# Patient Record
Sex: MALE | Race: WHITE | NOT HISPANIC OR LATINO | Employment: OTHER | URBAN - NONMETROPOLITAN AREA
[De-identification: names, ages, dates, MRNs, and addresses within clinical notes are randomized per-mention and may not be internally consistent; named-entity substitution may affect disease eponyms.]

---

## 2017-01-02 DIAGNOSIS — R10.13 DYSPEPSIA: Primary | ICD-10-CM

## 2017-01-04 RX ORDER — OMEPRAZOLE 40 MG/1
CAPSULE, DELAYED RELEASE ORAL
Qty: 90 CAPSULE | Refills: 3 | Status: SHIPPED | OUTPATIENT
Start: 2017-01-04 | End: 2018-01-15

## 2017-01-25 ENCOUNTER — TRANSFERRED RECORDS (OUTPATIENT)
Dept: HEALTH INFORMATION MANAGEMENT | Facility: HOSPITAL | Age: 82
End: 2017-01-25

## 2017-03-03 DIAGNOSIS — I10 HTN (HYPERTENSION): ICD-10-CM

## 2017-03-03 DIAGNOSIS — R11.0 NAUSEA: ICD-10-CM

## 2017-03-06 RX ORDER — AMLODIPINE BESYLATE 5 MG/1
TABLET ORAL
Qty: 90 TABLET | Refills: 1 | Status: SHIPPED | OUTPATIENT
Start: 2017-03-06 | End: 2017-09-05

## 2017-03-06 RX ORDER — ONDANSETRON 4 MG/1
TABLET, FILM COATED ORAL
Qty: 20 TABLET | Refills: 1 | Status: SHIPPED | OUTPATIENT
Start: 2017-03-06 | End: 2017-08-18

## 2017-08-18 DIAGNOSIS — R11.0 NAUSEA: ICD-10-CM

## 2017-08-18 RX ORDER — ONDANSETRON 4 MG/1
4 TABLET, FILM COATED ORAL EVERY 12 HOURS PRN
Qty: 20 TABLET | Refills: 1 | Status: SHIPPED | OUTPATIENT
Start: 2017-08-18 | End: 2018-04-14

## 2017-08-18 NOTE — TELEPHONE ENCOUNTER
Ondansetron      Last Written Prescription Date: 3/6/17  Last Fill Quantity: 20,  # refills: 1   Last Office Visit with [facility], [facility] or [facility] prescribing provider: 12/20/16

## 2017-08-21 DIAGNOSIS — R60.0 PERIPHERAL EDEMA: ICD-10-CM

## 2017-08-21 NOTE — TELEPHONE ENCOUNTER
furosemide (LASIX) 20 MG tablet   Last Written Prescription Date: 10/12/16  Last Fill Quantity: 30, # refills: 10  Last Office Visit with G, P or [facility] prescribing provider: 12/20/16       Potassium   Date Value Ref Range Status   09/20/2016 4.2 3.4 - 5.3 mmol/L Final     Creatinine   Date Value Ref Range Status   09/20/2016 1.35 (H) 0.66 - 1.25 mg/dL Final     BP Readings from Last 3 Encounters:   12/20/16 114/60   09/20/16 132/62   08/15/16 136/58

## 2017-08-22 RX ORDER — FUROSEMIDE 20 MG
TABLET ORAL
Qty: 30 TABLET | Refills: 3 | Status: SHIPPED | OUTPATIENT
Start: 2017-08-22 | End: 2017-12-27

## 2017-08-28 DIAGNOSIS — E03.9 HYPOTHYROIDISM: ICD-10-CM

## 2017-08-28 RX ORDER — LEVOTHYROXINE SODIUM 75 UG/1
TABLET ORAL
Qty: 90 TABLET | Refills: 0 | Status: SHIPPED | OUTPATIENT
Start: 2017-08-28 | End: 2018-03-01

## 2017-08-28 NOTE — TELEPHONE ENCOUNTER
Levothyroxine     Last Written Prescription Date: 11/29/16  Last Quantity: 90, # refills: 2  Last Office Visit with G, P or [facility] prescribing provider: 12/20/16        TSH   Date Value Ref Range Status   09/20/2016 2.15 0.40 - 4.00 mU/L Final

## 2017-09-05 DIAGNOSIS — I10 HTN (HYPERTENSION): ICD-10-CM

## 2017-09-06 NOTE — TELEPHONE ENCOUNTER
Norvasc      Last Written Prescription Date: 3/6/17  Last Fill Quantity: 90, # refills: 1    Last Office Visit with G, P or [facility] prescribing provider:  12/20/16   Future Office Visit:        BP Readings from Last 3 Encounters:   12/20/16 114/60   09/20/16 132/62   08/15/16 136/58

## 2017-09-07 RX ORDER — AMLODIPINE BESYLATE 5 MG/1
TABLET ORAL
Qty: 90 TABLET | Refills: 1 | Status: SHIPPED | OUTPATIENT
Start: 2017-09-07 | End: 2018-03-01

## 2017-09-21 DIAGNOSIS — I87.2 VENOUS STASIS DERMATITIS OF BOTH LOWER EXTREMITIES: ICD-10-CM

## 2017-09-21 RX ORDER — TRIAMCINOLONE ACETONIDE 1 MG/G
OINTMENT TOPICAL
Qty: 80 G | Refills: 1 | Status: SHIPPED | OUTPATIENT
Start: 2017-09-21 | End: 2018-04-17

## 2017-09-21 NOTE — TELEPHONE ENCOUNTER
Triamcinolone       Last Written Prescription Date:6/20/16  Last Fill Quantity: 80g,  # refills: 1   Last Office Visit with [facility], [facility] or [facility] prescribing provider: 12/20/16    Medication d/c's 8/15/16    Clarita Smith LPN

## 2017-11-14 ENCOUNTER — OFFICE VISIT (OUTPATIENT)
Dept: FAMILY MEDICINE | Facility: OTHER | Age: 82
End: 2017-11-14
Attending: FAMILY MEDICINE
Payer: MEDICARE

## 2017-11-14 VITALS
HEART RATE: 67 BPM | DIASTOLIC BLOOD PRESSURE: 68 MMHG | OXYGEN SATURATION: 98 % | HEIGHT: 65 IN | SYSTOLIC BLOOD PRESSURE: 136 MMHG | RESPIRATION RATE: 16 BRPM | TEMPERATURE: 97 F | BODY MASS INDEX: 28.82 KG/M2 | WEIGHT: 173 LBS

## 2017-11-14 DIAGNOSIS — N18.30 CKD (CHRONIC KIDNEY DISEASE) STAGE 3, GFR 30-59 ML/MIN (H): ICD-10-CM

## 2017-11-14 DIAGNOSIS — E11.9 TYPE 2 DIABETES MELLITUS WITHOUT COMPLICATION, WITHOUT LONG-TERM CURRENT USE OF INSULIN (H): Primary | ICD-10-CM

## 2017-11-14 DIAGNOSIS — D63.1 ANEMIA IN STAGE 3 CHRONIC KIDNEY DISEASE (H): ICD-10-CM

## 2017-11-14 DIAGNOSIS — N18.30 ANEMIA IN STAGE 3 CHRONIC KIDNEY DISEASE (H): ICD-10-CM

## 2017-11-14 DIAGNOSIS — R41.3 MEMORY DIFFICULTY: ICD-10-CM

## 2017-11-14 DIAGNOSIS — I10 BENIGN ESSENTIAL HYPERTENSION: ICD-10-CM

## 2017-11-14 DIAGNOSIS — E03.4 HYPOTHYROIDISM DUE TO ACQUIRED ATROPHY OF THYROID: ICD-10-CM

## 2017-11-14 DIAGNOSIS — K13.0 ANGULAR CHEILITIS: ICD-10-CM

## 2017-11-14 DIAGNOSIS — Z23 NEED FOR VACCINATION WITH 13-POLYVALENT PNEUMOCOCCAL CONJUGATE VACCINE: ICD-10-CM

## 2017-11-14 LAB
ALBUMIN UR-MCNC: NEGATIVE MG/DL
APPEARANCE UR: CLEAR
BILIRUB UR QL STRIP: NEGATIVE
COLOR UR AUTO: YELLOW
ERYTHROCYTE [DISTWIDTH] IN BLOOD BY AUTOMATED COUNT: 13 % (ref 10–15)
GLUCOSE UR STRIP-MCNC: NEGATIVE MG/DL
HCT VFR BLD AUTO: 34 % (ref 40–53)
HGB BLD-MCNC: 11.4 G/DL (ref 13.3–17.7)
HGB UR QL STRIP: NEGATIVE
KETONES UR STRIP-MCNC: NEGATIVE MG/DL
LEUKOCYTE ESTERASE UR QL STRIP: NEGATIVE
MCH RBC QN AUTO: 31.8 PG (ref 26.5–33)
MCHC RBC AUTO-ENTMCNC: 33.5 G/DL (ref 31.5–36.5)
MCV RBC AUTO: 95 FL (ref 78–100)
NITRATE UR QL: NEGATIVE
PH UR STRIP: 6 PH (ref 5–7)
PLATELET # BLD AUTO: 154 10E9/L (ref 150–450)
RBC # BLD AUTO: 3.58 10E12/L (ref 4.4–5.9)
SOURCE: NORMAL
SP GR UR STRIP: 1.01 (ref 1–1.03)
UROBILINOGEN UR STRIP-ACNC: 0.2 EU/DL (ref 0.2–1)
WBC # BLD AUTO: 6.6 10E9/L (ref 4–11)

## 2017-11-14 PROCEDURE — 84443 ASSAY THYROID STIM HORMONE: CPT | Mod: ZL | Performed by: FAMILY MEDICINE

## 2017-11-14 PROCEDURE — 85027 COMPLETE CBC AUTOMATED: CPT | Mod: ZL | Performed by: FAMILY MEDICINE

## 2017-11-14 PROCEDURE — 36415 COLL VENOUS BLD VENIPUNCTURE: CPT | Mod: ZL | Performed by: FAMILY MEDICINE

## 2017-11-14 PROCEDURE — 80061 LIPID PANEL: CPT | Mod: ZL | Performed by: FAMILY MEDICINE

## 2017-11-14 PROCEDURE — G0009 ADMIN PNEUMOCOCCAL VACCINE: HCPCS | Performed by: FAMILY MEDICINE

## 2017-11-14 PROCEDURE — 81003 URINALYSIS AUTO W/O SCOPE: CPT | Mod: ZL | Performed by: FAMILY MEDICINE

## 2017-11-14 PROCEDURE — 83036 HEMOGLOBIN GLYCOSYLATED A1C: CPT | Mod: ZL | Performed by: FAMILY MEDICINE

## 2017-11-14 PROCEDURE — 99212 OFFICE O/P EST SF 10 MIN: CPT | Mod: 25

## 2017-11-14 PROCEDURE — 90670 PCV13 VACCINE IM: CPT | Performed by: FAMILY MEDICINE

## 2017-11-14 PROCEDURE — 90471 IMMUNIZATION ADMIN: CPT | Performed by: FAMILY MEDICINE

## 2017-11-14 PROCEDURE — 99214 OFFICE O/P EST MOD 30 MIN: CPT | Performed by: FAMILY MEDICINE

## 2017-11-14 PROCEDURE — 80048 BASIC METABOLIC PNL TOTAL CA: CPT | Mod: ZL | Performed by: FAMILY MEDICINE

## 2017-11-14 PROCEDURE — 40000788 ZZHCL STATISTIC ESTIMATED AVERAGE GLUCOSE: Mod: ZL | Performed by: FAMILY MEDICINE

## 2017-11-14 RX ORDER — NYSTATIN AND TRIAMCINOLONE ACETONIDE 100000; 1 [USP'U]/G; MG/G
CREAM TOPICAL 2 TIMES DAILY PRN
Qty: 30 G | Refills: 1 | Status: SHIPPED | OUTPATIENT
Start: 2017-11-14 | End: 2022-08-12

## 2017-11-14 ASSESSMENT — ANXIETY QUESTIONNAIRES

## 2017-11-14 ASSESSMENT — PATIENT HEALTH QUESTIONNAIRE - PHQ9
5. POOR APPETITE OR OVEREATING: NOT AT ALL
SUM OF ALL RESPONSES TO PHQ QUESTIONS 1-9: 0

## 2017-11-14 ASSESSMENT — PAIN SCALES - GENERAL: PAINLEVEL: NO PAIN (0)

## 2017-11-14 NOTE — MR AVS SNAPSHOT
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      Who to contact     If you have questions or need follow up information about today's clinic visit or your schedule please contact [facility] directly at [phone].  Normal or non-critical lab and imaging results will be communicated to you by MyChart, letter or phone within 4 business days after the clinic has received the results. If you do not hear from us within 7 days, please contact the clinic through e[employer] or phone. If you have a critical or abnormal lab result, we will notify you by phone as soon as possible.  Submit refill requests through [employer] or call your pharmacy and they will forward the refill request to us. Please allow 3 business days for your refill to be completed.          Additional Information About Your Visit        e[employer]e [employer] Information     Cur[employer]ulet lets you send messages to your doctor, view your test results, renew your prescriptions, schedule appointments and more. To sign up, go to [email or web address] . Click on \"Log in\" on the left side of the screen, which will take you to the Welcome page. Then click on \"Sign up " "Now\" on the right side of the page.     You will be asked to enter the access code listed below, as well as some personal information. Please follow the directions to create your username and password.     Your access code is: [email or web address]  Expires: 2018  4:06 PM     Your access code will  in 90 days. If you need help or a new code, please call your [facility] clinic or [phone].        Care EveryWhere ID     This is your Care EveryWhere ID. This could be used by other organizations to access your [facility] medical records  [id number]        Your Vitals Were     Pulse Temperature Respirations Height Pulse Oximetry BMI (Body Mass Index)    67 97  F (36.1  C) (Tympanic) 16 5' 4.75\" (1.645 m) 98% 29.01 kg/m2       Blood Pressure from Last 3 Encounters:   17 136/68   16 114/60   16 132/62    Weight from Last 3 Encounters:   17 173 lb (78.5 kg)   16 178 lb (80.7 kg)   16 175 lb (79.4 kg)              We Performed the Following     *UA reflex to Microscopic and Culture - [facility]     ADMIN 1st VACCINE     Basic metabolic panel     CBC with platelets     Hemoglobin A1c     Lipid Profile     PNEUMOCOCCAL CONJ VACCINE 13 VALENT IM     TSH          Today's Medication Changes          These changes are accurate as of: 17  4:06 PM.  If you have any questions, ask your nurse or doctor.               Start taking these medicines.        Dose/Directions    nystatin-triamcinolone cream   Commonly known as:  MYCOLOG II   Used for:  Angular cheilitis   Started by:  Kiersten Mueller MD        Apply topically 2 times daily as needed (rash)   Quantity:  30 g   Refills:  1         Stop taking these medicines if you haven't already. Please contact your care team if you have questions.     ibuprofen 800 MG tablet   Commonly known as:  ADVIL/MOTRIN   Stopped by:  Kiersten Mueller MD                Where to get your medicines      These medications were sent to [facility] - [place] - [address]  [address] [place] [address]     Phone:  [phone]     nystatin-triamcinolone cream                Primary Care Provider Office Phone # Fax #    Kiersten Mueller [phone] [address] [facility] [address]        Equal Access to Services     [facility]: Hadii aad ku hadasho Soomaali, waaxda luqadaha, qaybta kaalmada adeegyada, waxay idiin hayaan adeeg kharajessica laoli . So [facility] [phone].    ATENCIÓN: Si habla español, tiene a garcia disposición servicios gratuitos de asistencia lingüística. Yuan al [phone].    We comply with applicable federal civil rights laws and [place] laws. We do not discriminate on the basis of race, color, national origin, age, disability, sex, sexual orientation, or gender identity.            Thank you!     Thank you for choosing [facility]  for your care. Our goal is always to provide you with excellent care. Hearing back from our patients is one way we can continue to improve our services. Please take a few minutes to complete the written survey that you may receive in the mail after your visit with us. Thank you!             Your Updated Medication List - Protect others around you: Learn how to safely use, store and throw away your medicines at www.disposemymeds.org.          This list is accurate as of: 11/14/17  4:06 PM.  Always use your most recent med list.                   Brand Name Dispense Instructions for use Diagnosis    amLODIPine 5 MG tablet    NORVASC    90 tablet    TAKE 1 TABLET DAILY    HTN (hypertension)       cholecalciferol 1000 UNITS capsule    vitamin  -D     1 capsule daily Take 1 by oral route every day.        COLACE PO      Take 100 mg by mouth 4 tabs daily        ferrous sulfate 325 (65 FE) MG tablet    IRON    30 tablet    Take 1 tablet (325 mg) by mouth daily (with breakfast)    Anemia       fluticasone 50 MCG/ACT spray    FLONASE    16 g    SPRAY 1-2 SPRAYS  INTO BOTH NOSTRILS DAILY    Chronic rhinitis       furosemide 20 MG tablet    LASIX    30 tablet    TAKE 1 TABLET (20 MG) BY MOUTH DAILY AS NEEDED    Peripheral edema       hydrocortisone valerate 0.2 % ointment    WEST-ROCKY    60 g    Apply sparingly to affected area every other day    Venous stasis dermatitis of both lower extremities       levothyroxine 75 MCG tablet    SYNTHROID/LEVOTHROID    90 tablet    TAKE 1 TABLET DAILY FOR THYROID    Hypothyroidism       METHADONE HCL PO      Take 5 mg by mouth Takes 1 tab every AM and 1.5 tabs every evening        NEURONTIN PO      Take 300 mg by mouth Takes 2cabs every AM, 2 cap afternoon and 3 caps every evening        nystatin-triamcinolone cream    MYCOLOG II    30 g    Apply topically 2 times daily as needed (rash)    Angular cheilitis       omeprazole 40 MG capsule    priLOSEC    90 capsule    TAKE 1 CAPSULE (40 MG) BY MOUTH DAILY. BEST 30 MIN BEFORE A MEAL    Dyspepsia       ondansetron 4 MG tablet    ZOFRAN    20 tablet    Take 1 tablet (4 mg) by mouth every 12 hours as needed for nausea    Nausea       triamcinolone 0.1 % ointment    KENALOG    80 g    APPLY TOPICALLY 2 TIMES DAILY    Venous stasis dermatitis of both lower extremities

## 2017-11-14 NOTE — LETTER
November 16, 2017      Graham Mills  [address] [place] [address]        Dear Graham,       Results for orders placed or performed in visit on 11/14/17   *UA reflex to Microscopic and Culture - [facility]   Result Value Ref Range    Color Urine Yellow     Appearance Urine Clear     Glucose Urine Negative NEG^Negative mg/dL    Bilirubin Urine Negative NEG^Negative    Ketones Urine Negative NEG^Negative mg/dL    Specific Gravity Urine 1.015 1.003 - 1.035    Blood Urine Negative NEG^Negative    pH Urine 6.0 5.0 - 7.0 pH    Protein Albumin Urine Negative NEG^Negative mg/dL    Urobilinogen Urine 0.2 0.2 - 1.0 EU/dL    Nitrite Urine Negative NEG^Negative    Leukocyte Esterase Urine Negative NEG^Negative    Source Midstream Urine    Basic metabolic panel   Result Value Ref Range    Sodium 137 133 - 144 mmol/L    Potassium 3.8 3.4 - 5.3 mmol/L    Chloride 102 94 - 109 mmol/L    Carbon Dioxide 28 20 - 32 mmol/L    Anion Gap 7 3 - 14 mmol/L    Glucose 90 70 - 99 mg/dL    Urea Nitrogen 19 7 - 30 mg/dL    Creatinine 1.30 (H) 0.66 - 1.25 mg/dL    GFR Estimate 53 (L) >60 mL/min/1.7m2    GFR Estimate If Black 64 >60 mL/min/1.7m2    Calcium 9.0 8.5 - 10.1 mg/dL   Hemoglobin A1c   Result Value Ref Range    Hemoglobin A1C 6.0 4.3 - 6.0 %   Lipid Profile   Result Value Ref Range    Cholesterol 182 <200 mg/dL    Triglycerides 127 <150 mg/dL    HDL Cholesterol 50 >39 mg/dL    LDL Cholesterol Calculated 107 (H) <100 mg/dL    Non HDL Cholesterol 132 (H) <130 mg/dL   TSH   Result Value Ref Range    TSH 2.03 0.40 - 4.00 mU/L   CBC with platelets   Result Value Ref Range    WBC 6.6 4.0 - 11.0 10e9/L    RBC Count 3.58 (L) 4.4 - 5.9 10e12/L    Hemoglobin 11.4 (L) 13.3 - 17.7 g/dL    Hematocrit 34.0 (L) 40.0 - 53.0 %    MCV 95 78 - 100 fl    MCH 31.8 26.5 - 33.0 pg    MCHC 33.5 31.5 - 36.5 g/dL    RDW 13.0 10.0 - 15.0 %    Platelet Count 154 150 - 450 10e9/L   Estimated Average Glucose   Result Value Ref Range    Estimated Average  Glucose 126 mg/dL             Sincerely,        Kiersten Mueller MD

## 2017-11-14 NOTE — NURSING NOTE
"Chief Complaint   Patient presents with     UTI     Recheck Medication       Initial /68 (BP Location: Left arm, Patient Position: Sitting, Cuff Size: Adult Regular)  Pulse 67  Temp 97  F (36.1  C) (Tympanic)  Resp 16  Ht 5' 4.75\" (1.645 m)  Wt 173 lb (78.5 kg)  SpO2 98%  BMI 29.01 kg/m2 Estimated body mass index is 29.01 kg/(m^2) as calculated from the following:    Height as of this encounter: 5' 4.75\" (1.645 m).    Weight as of this encounter: 173 lb (78.5 kg).  Medication Reconciliation: complete   Sandy Miller MA  "

## 2017-11-14 NOTE — PROGRESS NOTES
SUBJECTIVE:                                                    Graham Mills is a 84 year old male who presents to clinic today for the following health issues:      Diabetes Follow-up      Patient is checking blood sugars: not at all    Diabetic concerns: None     Symptoms of hypoglycemia (low blood sugar): none     Paresthesias (numbness or burning in feet) or sores: No     Date of last diabetic eye exam: 10/24/17      Hypertension Follow-up      Outpatient blood pressures are not being checked.    Low Salt Diet: not monitoring salt        Amount of exercise or physical activity: 6-7 days/week for an average of 45-60 minutes    Problems taking medications regularly: No    Medication side effects: none    Diet: regular (no restrictions)    Hypothyroidism Follow-up      Since last visit, patient describes the following symptoms: Weight stable, no hair loss, no skin changes, no constipation, no loose stools      Amount of exercise or physical activity: 6-7 days/week for an average of 45-60 minutes    Problems taking medications regularly: No    Medication side effects: none    Diet: regular (no restrictions)    Possible UTI      Duration: Couple of months    Description (location/character/radiation): forgetfullness    Intensity:  low    Accompanying signs and symptoms: N/A    History (similar episodes/previous evaluation): None    Precipitating or alleviating factors: None    Therapies tried and outcome: None    Daughters have noted mild memory changes.  They know that UTIs can sometimes cause issues, and they would like a UA checked just to be sure.        Sore on corner of mouth      Duration: couple of months    Description (location/character/radiation): Red irritation near corner of mouth    Intensity:  moderate    Accompanying signs and symptoms: none    History (similar episodes/previous evaluation): None    Precipitating or alleviating factors: None    Therapies tried and outcome: None          Problem  list and histories reviewed & adjusted, as indicated.  Additional history: as documented    Patient Active Problem List   Diagnosis     Benign essential hypertension     Other extrapyramidal diseases and abnormal movemen     Transient cerebral ischemia     Osteoarthrosis involving lower leg     Obsessive-compulsive disorder     Restless Legs Syndrome     Lumbago     Spinal stenosis     Advanced care planning/counseling discussion     Venous (peripheral) insufficiency     GERD (gastroesophageal reflux disease)     Hypothyroidism     Anemia in chronic kidney disease (CKD)     CKD (chronic kidney disease) stage 3, GFR 30-59 ml/min     Type 2 diabetes mellitus without complication (H)     Past Surgical History:   Procedure Laterality Date     BACK SURGERY  2003     CHOLECYSTECTOMY  1970     COLONOSCOPY  2000     hemicolectomy      Polyps, colon     hiatal hernia  1970     TONSILLECTOMY         Social History   Substance Use Topics     Smoking status: Former Smoker     Packs/day: 1.00     Years: 10.00     Types: Cigarettes     Smokeless tobacco: Never Used      Comment: tried to quit-yes, year quit-1975, no passive exposure     Alcohol use No     Family History   Problem Relation Age of Onset     Other - See Comments Mother      Cataracts         Current Outpatient Prescriptions   Medication Sig Dispense Refill     nystatin-triamcinolone (MYCOLOG II) cream Apply topically 2 times daily as needed (rash) 30 g 1     triamcinolone (KENALOG) 0.1 % ointment APPLY TOPICALLY 2 TIMES DAILY 80 g 1     amLODIPine (NORVASC) 5 MG tablet TAKE 1 TABLET DAILY 90 tablet 1     levothyroxine (SYNTHROID/LEVOTHROID) 75 MCG tablet TAKE 1 TABLET DAILY FOR THYROID 90 tablet 0     ondansetron (ZOFRAN) 4 MG tablet Take 1 tablet (4 mg) by mouth every 12 hours as needed for nausea 20 tablet 1     omeprazole (PRILOSEC) 40 MG capsule TAKE 1 CAPSULE (40 MG) BY MOUTH DAILY. BEST 30 MIN BEFORE A MEAL 90 capsule 3     hydrocortisone valerate (WEST-ROCKY)  "0.2 % ointment Apply sparingly to affected area every other day 60 g 1     fluticasone (FLONASE) 50 MCG/ACT nasal spray SPRAY 1-2 SPRAYS INTO BOTH NOSTRILS DAILY 16 g 3     ferrous sulfate (IRON) 325 (65 FE) MG tablet Take 1 tablet (325 mg) by mouth daily (with breakfast) 30 tablet 2     Docusate Sodium (COLACE PO) Take 100 mg by mouth 4 tabs daily       Gabapentin (NEURONTIN PO) Take 300 mg by mouth Takes 2cabs every AM, 2 cap afternoon and 3 caps every evening       METHADONE HCL PO Take 5 mg by mouth Takes 1 tab every AM and 1.5 tabs every evening       cholecalciferol (VITAMIN D3) 1000 UNITS capsule 1 capsule daily Take 1 by oral route every day.       furosemide (LASIX) 20 MG tablet TAKE 1 TABLET (20 MG) BY MOUTH DAILY AS NEEDED (Patient not taking: Reported on 11/14/2017) 30 tablet 3     Allergies   Allergen Reactions     Augmentin Nausea     Flu Virus Vaccine      Levofloxacin      Levaquin           ROS:  Constitutional, HEENT, cardiovascular, pulmonary, gi and gu systems are negative, except as otherwise noted.      OBJECTIVE:   /68 (BP Location: Left arm, Patient Position: Sitting, Cuff Size: Adult Regular)  Pulse 67  Temp 97  F (36.1  C) (Tympanic)  Resp 16  Ht 5' 4.75\" (1.645 m)  Wt 173 lb (78.5 kg)  SpO2 98%  BMI 29.01 kg/m2  Body mass index is 29.01 kg/(m^2).  GENERAL: healthy, alert and no distress  HENT: angular chelitis noted  PSYCH: mentation appears normal, affect normal/bright    Diagnostic Test Results:  none     ASSESSMENT/PLAN:     Diabetes Type II, A1c Controlled, non-insulin dependent   Associated with the following complications    Renal Complications:  CKD    Ophthalmologic Complications: None    Neurologic Complications: None    Peripheral Vascular Complications:  None    Other: None   Plan:  Labs:  See orders    Hypertension; controlled   Associated with the following complications:    CKD and Diabetes   Plan:  Labs:   See orders    Chronic Kidney Disease Stage 3 , stable "     Associated with the following complications: Anemia     Plan:  Labs See orders        Hypothyroidism; controlled/euthyroid   Plan:  Labs:  See orders          ICD-10-CM    1. Type 2 diabetes mellitus without complication, without long-term current use of insulin (H) E11.9 Hemoglobin A1c     Lipid Profile   2. Benign essential hypertension I10 Basic metabolic panel   3. Hypothyroidism due to acquired atrophy of thyroid E03.4 TSH   4. CKD (chronic kidney disease) stage 3, GFR 30-59 ml/min N18.3 Basic metabolic panel   5. Anemia in stage 3 chronic kidney disease N18.3 CBC with platelets    D63.1    6. Memory difficulty R41.3 *UA reflex to Microscopic and Culture - [facility]     CANCELED: UA reflex to Microscopic   7. Angular cheilitis K13.0 nystatin-triamcinolone (MYCOLOG II) cream   8. Need for vaccination with 13-polyvalent pneumococcal conjugate vaccine Z23 PNEUMOCOCCAL CONJ VACCINE 13 VALENT IM     ADMIN 1st VACCINE       FUTURE APPOINTMENTS:       - Follow-up visit in 6 months, sooner as needed.      Kiersten Mueller MD  [facility]

## 2017-11-15 LAB
ANION GAP SERPL CALCULATED.3IONS-SCNC: 7 MMOL/L (ref 3–14)
BUN SERPL-MCNC: 19 MG/DL (ref 7–30)
CALCIUM SERPL-MCNC: 9 MG/DL (ref 8.5–10.1)
CHLORIDE SERPL-SCNC: 102 MMOL/L (ref 94–109)
CHOLEST SERPL-MCNC: 182 MG/DL
CO2 SERPL-SCNC: 28 MMOL/L (ref 20–32)
CREAT SERPL-MCNC: 1.3 MG/DL (ref 0.66–1.25)
EST. AVERAGE GLUCOSE BLD GHB EST-MCNC: 126 MG/DL
GFR SERPL CREATININE-BSD FRML MDRD: 53 ML/MIN/1.7M2
GLUCOSE SERPL-MCNC: 90 MG/DL (ref 70–99)
HBA1C MFR BLD: 6 % (ref 4.3–6)
HDLC SERPL-MCNC: 50 MG/DL
LDLC SERPL CALC-MCNC: 107 MG/DL
NONHDLC SERPL-MCNC: 132 MG/DL
POTASSIUM SERPL-SCNC: 3.8 MMOL/L (ref 3.4–5.3)
SODIUM SERPL-SCNC: 137 MMOL/L (ref 133–144)
TRIGL SERPL-MCNC: 127 MG/DL
TSH SERPL DL<=0.005 MIU/L-ACNC: 2.03 MU/L (ref 0.4–4)

## 2017-11-15 ASSESSMENT — ANXIETY QUESTIONNAIRES: GAD7 TOTAL SCORE: 0

## 2017-12-27 DIAGNOSIS — R60.0 PERIPHERAL EDEMA: ICD-10-CM

## 2017-12-27 NOTE — TELEPHONE ENCOUNTER
Furosemide 20mg      Last Written Prescription Date: 12/27/17  Last Fill Quantity: 30,  # refills: 3   Last Office Visit with G, [facility] or [facility] prescribing provider: 11/14/17

## 2017-12-29 RX ORDER — FUROSEMIDE 20 MG
TABLET ORAL
Qty: 30 TABLET | Refills: 5 | Status: SHIPPED | OUTPATIENT
Start: 2017-12-29 | End: 2018-06-18

## 2018-01-15 DIAGNOSIS — R10.13 DYSPEPSIA: ICD-10-CM

## 2018-01-16 RX ORDER — OMEPRAZOLE 40 MG/1
CAPSULE, DELAYED RELEASE ORAL
Qty: 90 CAPSULE | Refills: 2 | Status: SHIPPED | OUTPATIENT
Start: 2018-01-16 | End: 2018-08-11

## 2018-03-01 DIAGNOSIS — E03.9 HYPOTHYROIDISM: ICD-10-CM

## 2018-03-01 DIAGNOSIS — I10 HTN (HYPERTENSION): ICD-10-CM

## 2018-03-01 RX ORDER — AMLODIPINE BESYLATE 5 MG/1
TABLET ORAL
Qty: 90 TABLET | Refills: 0 | Status: SHIPPED | OUTPATIENT
Start: 2018-03-01 | End: 2018-09-04

## 2018-03-01 RX ORDER — LEVOTHYROXINE SODIUM 75 UG/1
TABLET ORAL
Qty: 90 TABLET | Refills: 0 | Status: SHIPPED | OUTPATIENT
Start: 2018-03-01 | End: 2018-06-05

## 2018-06-05 DIAGNOSIS — E03.9 HYPOTHYROIDISM: ICD-10-CM

## 2018-06-05 NOTE — TELEPHONE ENCOUNTER
LEVOTHYROXINE 75 MCG TABLET 75 TAB    Last Written Prescription Date:  3/1/18  Last Fill Quantity: 90,   # refills: 0  Last Office Visit: 11/14/17  Future Office visit:

## 2018-06-07 DIAGNOSIS — E03.9 HYPOTHYROIDISM: ICD-10-CM

## 2018-06-07 RX ORDER — LEVOTHYROXINE SODIUM 75 UG/1
TABLET ORAL
Qty: 90 TABLET | Refills: 0 | Status: SHIPPED | OUTPATIENT
Start: 2018-06-07 | End: 2018-09-04

## 2018-06-08 RX ORDER — LEVOTHYROXINE SODIUM 75 UG/1
TABLET ORAL
Qty: 90 TABLET | Refills: 0 | OUTPATIENT
Start: 2018-06-08

## 2018-06-08 NOTE — TELEPHONE ENCOUNTER
Levothyroxine  Last Written Prescription Date: 6/7/18  Last Fill Quantity: 90 # of Refills: 0  Last Office Visit: 11/14/17

## 2018-06-18 ENCOUNTER — TRANSFERRED RECORDS (OUTPATIENT)
Dept: HEALTH INFORMATION MANAGEMENT | Facility: CLINIC | Age: 83
End: 2018-06-18

## 2018-06-18 DIAGNOSIS — R60.0 PERIPHERAL EDEMA: ICD-10-CM

## 2018-06-18 RX ORDER — FUROSEMIDE 20 MG
TABLET ORAL
Qty: 30 TABLET | Refills: 3 | Status: SHIPPED | OUTPATIENT
Start: 2018-06-18 | End: 2018-10-19

## 2018-07-06 LAB — EJECTION FRACTION: =>55 %

## 2018-08-09 DIAGNOSIS — R11.0 NAUSEA: ICD-10-CM

## 2018-08-09 NOTE — TELEPHONE ENCOUNTER
zofran      Last Written Prescription Date:  4/16/18  Last Fill Quantity: 20,   # refills: 1  Last Office Visit: 11/14/17  Future Office visit: none

## 2018-08-10 RX ORDER — ONDANSETRON 4 MG/1
TABLET, FILM COATED ORAL
Qty: 20 TABLET | Refills: 1 | Status: SHIPPED | OUTPATIENT
Start: 2018-08-10 | End: 2018-11-23

## 2018-09-04 DIAGNOSIS — I10 HTN (HYPERTENSION): ICD-10-CM

## 2018-09-04 NOTE — TELEPHONE ENCOUNTER
Amlodipine   Last Written Prescription Date: 3/1/18  Last Fill Quantity: 90 # of Refills: 0  Last Office Visit: 11/14/17

## 2018-09-06 RX ORDER — AMLODIPINE BESYLATE 5 MG/1
TABLET ORAL
Qty: 90 TABLET | Refills: 0 | Status: SHIPPED | OUTPATIENT
Start: 2018-09-06 | End: 2018-12-07

## 2018-10-19 DIAGNOSIS — R60.0 PERIPHERAL EDEMA: ICD-10-CM

## 2018-10-19 RX ORDER — FUROSEMIDE 20 MG
TABLET ORAL
Qty: 30 TABLET | Refills: 3 | Status: SHIPPED | OUTPATIENT
Start: 2018-10-19 | End: 2021-06-23

## 2018-11-16 ENCOUNTER — TRANSFERRED RECORDS (OUTPATIENT)
Dept: HEALTH INFORMATION MANAGEMENT | Facility: CLINIC | Age: 83
End: 2018-11-16

## 2018-11-23 DIAGNOSIS — R11.0 NAUSEA: ICD-10-CM

## 2018-11-23 RX ORDER — ONDANSETRON 4 MG/1
TABLET, FILM COATED ORAL
Qty: 20 TABLET | Refills: 0 | Status: SHIPPED | OUTPATIENT
Start: 2018-11-23 | End: 2019-08-07

## 2018-12-05 ENCOUNTER — TRANSFERRED RECORDS (OUTPATIENT)
Dept: HEALTH INFORMATION MANAGEMENT | Facility: CLINIC | Age: 83
End: 2018-12-05

## 2018-12-07 DIAGNOSIS — I10 HTN (HYPERTENSION): ICD-10-CM

## 2018-12-07 DIAGNOSIS — E03.9 HYPOTHYROIDISM, UNSPECIFIED TYPE: Primary | ICD-10-CM

## 2018-12-07 DIAGNOSIS — I10 BENIGN ESSENTIAL HYPERTENSION: Primary | ICD-10-CM

## 2018-12-07 DIAGNOSIS — E03.9 HYPOTHYROIDISM: ICD-10-CM

## 2018-12-10 RX ORDER — LEVOTHYROXINE SODIUM 75 UG/1
75 TABLET ORAL DAILY
Qty: 90 TABLET | Refills: 1 | Status: SHIPPED | OUTPATIENT
Start: 2018-12-10 | End: 2019-06-06

## 2018-12-10 RX ORDER — AMLODIPINE BESYLATE 5 MG/1
5 TABLET ORAL DAILY
Qty: 90 TABLET | Refills: 0 | Status: SHIPPED | OUTPATIENT
Start: 2018-12-10 | End: 2021-06-23

## 2018-12-10 NOTE — TELEPHONE ENCOUNTER
Levothyroxine      Last Written Prescription Date:  9/4/18  Last Fill Quantity: 90,   # refills: 1  Last Office Visit: 11/14/18\

## 2018-12-10 NOTE — TELEPHONE ENCOUNTER
Protocol failed due to    Recent (12 mo) or future (30 days) visit within the authorizing provider's specialty    Normal serum creatinine on file in past 12 months     Please advise. Thank you!

## 2018-12-10 NOTE — TELEPHONE ENCOUNTER
Amlodipine      Last Written Prescription Date:  9/6/18  Last Fill Quantity: 90,   # refills: 0  Last Office Visit: 11/14/17

## 2018-12-10 NOTE — TELEPHONE ENCOUNTER
Protocol failed due to    Recent (12 mo) or future (30 days) visit within the authorizing provider's specialty    Normal TSH on file in past 12 months     Last office visit 11/14/17    Please advise. Thank you!

## 2019-01-03 ENCOUNTER — TRANSFERRED RECORDS (OUTPATIENT)
Dept: HEALTH INFORMATION MANAGEMENT | Facility: CLINIC | Age: 84
End: 2019-01-03

## 2019-02-18 DIAGNOSIS — R10.13 DYSPEPSIA: ICD-10-CM

## 2019-02-18 RX ORDER — OMEPRAZOLE 40 MG/1
CAPSULE, DELAYED RELEASE ORAL
Qty: 90 CAPSULE | Refills: 0 | OUTPATIENT
Start: 2019-02-18

## 2019-02-18 NOTE — TELEPHONE ENCOUNTER
Prilosec  Last Written Prescription Date:  8/14/18  Last Fill Qty:  90, # Refills:  0  Last Office Visit:  11/14/17    Patient has not been seen since 11/14/17.  No appointments scheduled.  Please advise.  Thank you.

## 2019-04-04 NOTE — TELEPHONE ENCOUNTER
- continue allopurinol   furosemide (LASIX) 20 MG tablet      Last Written Prescription Date:  6/18/18  Last Fill Quantity: 30,   # refills: 3  Last Office Visit: 11/14/17  Future Office visit:       Routing refill request to provider for review/approval because:   Normal serum creatinine on file in past 12 months           Recent Labs   Lab Test  11/14/17   1551   CR  1.30*        Please advise. Thanks!

## 2019-05-30 ENCOUNTER — TRANSFERRED RECORDS (OUTPATIENT)
Dept: HEALTH INFORMATION MANAGEMENT | Facility: CLINIC | Age: 84
End: 2019-05-30

## 2019-08-07 DIAGNOSIS — R11.0 NAUSEA: ICD-10-CM

## 2019-08-07 RX ORDER — ONDANSETRON 4 MG/1
TABLET, FILM COATED ORAL
Qty: 20 TABLET | Refills: 0 | Status: SHIPPED | OUTPATIENT
Start: 2019-08-07 | End: 2020-10-27

## 2019-12-04 DIAGNOSIS — E03.9 HYPOTHYROIDISM, UNSPECIFIED TYPE: ICD-10-CM

## 2019-12-04 LAB
ALT SERPL-CCNC: 9 U/L (ref 18–65)
CHOLESTEROL (EXTERNAL): 181 MG/DL
CREATININE (EXTERNAL): 1.36 MG/DL (ref 0.8–1.5)
GFR ESTIMATED (EXTERNAL): 50 ML/MIN/1.73M2
GLUCOSE (EXTERNAL): 101 MG/DL (ref 60–99)
HBA1C MFR BLD: 6.3 %
HDLC SERPL-MCNC: 41 MG/DL
LDL CHOLESTEROL CALCULATED (EXTERNAL): 105 MG/DL
POTASSIUM (EXTERNAL): 3.7 MEQ/L (ref 3.5–5.1)
TRIGLYCERIDES (EXTERNAL): 177 MG/DL
TSH SERPL-ACNC: 0.87 MIU/ML (ref 0.35–4.8)

## 2019-12-09 NOTE — TELEPHONE ENCOUNTER
Looks like patient might be receiving care at [facility].  Please confirm this, then I will decline refill.

## 2019-12-09 NOTE — TELEPHONE ENCOUNTER
levothyroxine (SYNTHROID/LEVOTHROID) 75 MCG tablet      Last Written Prescription Date:  6/7/19  Last Fill Quantity: 90,   # refills: 1  Last Office Visit: 11/14/17  Future Office visit:       Routing refill request to provider for review/approval because:  Due for office visit and labs. Please advise. Thank you!    TSH   Date Value Ref Range Status   11/14/2017 2.03 0.40 - 4.00 mU/L Final

## 2019-12-31 RX ORDER — LEVOTHYROXINE SODIUM 75 UG/1
TABLET ORAL
Qty: 90 TABLET | Refills: 1 | OUTPATIENT
Start: 2019-12-31

## 2020-01-10 ENCOUNTER — TRANSFERRED RECORDS (OUTPATIENT)
Dept: HEALTH INFORMATION MANAGEMENT | Facility: CLINIC | Age: 85
End: 2020-01-10

## 2020-01-10 LAB — EJECTION FRACTION: >=55 %

## 2020-02-03 ENCOUNTER — TRANSFERRED RECORDS (OUTPATIENT)
Dept: HEALTH INFORMATION MANAGEMENT | Facility: CLINIC | Age: 85
End: 2020-02-03

## 2020-07-06 ENCOUNTER — TELEPHONE (OUTPATIENT)
Dept: FAMILY MEDICINE | Facility: OTHER | Age: 85
End: 2020-07-06

## 2020-07-06 NOTE — TELEPHONE ENCOUNTER
Doctor returning call, states she just faxed over some notes. She is the pain specialist for the patient and she is closing her clinic. She states he needs a PCP that can and will cover the pain medications etc, states the current PCP does not seem to want to deal with pain medications etc.. She is requesting that Nadir Bonds take over pain control for this patient as she has worked with Nadir Bonds on other occassions with other patients and things seemed to work well. She states if there are any questions she can be reached at [phone]. She states she will continue medication fills until he establishes with Nadir Bonds. He is stable and there are no concerns or issues with pain medication abuse. Ashley LeChevalier LPN

## 2020-07-06 NOTE — TELEPHONE ENCOUNTER
Left message with Dr. Jolley to call clinic back with information she would like shared with Dr. Blancas

## 2020-07-06 NOTE — TELEPHONE ENCOUNTER
Pt is establishing care w/Dr. Blancas on 7-29-20. Dr. Jolley is currently seeing this pt and would like to give Dr. Blancas or her nurse info on pt. Please call back at [phone]. If she can't answer when you, she said you can leave a voice mail that tells her how to contact you back.

## 2020-07-08 NOTE — TELEPHONE ENCOUNTER
I am in the same group as patient's current PCP - Dr Mueller.  I have checked with several of our partners.  They also do not prescribe Methadone.  I do not either.  I do believe Dr Jacobson has, more for palliative/hospice cares, however.  Patient would need to transition to another pain clinic.

## 2020-07-08 NOTE — TELEPHONE ENCOUNTER
Spoke with patient, explained that Dr. Blancas does not prescribe methadone, he would like a provider that can prescribe this to him.  Do we know of any providers that do prescribe it?

## 2020-07-09 NOTE — TELEPHONE ENCOUNTER
Dr Jacobson would be worth asking.  He does a lot of geriatrics, end of life care, pain management.  This patient is 87.  Will route to Dr. Jacobson to see if that is a possibility?

## 2020-07-27 ENCOUNTER — OFFICE VISIT (OUTPATIENT)
Dept: FAMILY MEDICINE | Facility: OTHER | Age: 85
End: 2020-07-27
Attending: FAMILY MEDICINE
Payer: MEDICARE

## 2020-07-27 VITALS
HEIGHT: 65 IN | HEART RATE: 67 BPM | OXYGEN SATURATION: 98 % | DIASTOLIC BLOOD PRESSURE: 60 MMHG | BODY MASS INDEX: 27.63 KG/M2 | SYSTOLIC BLOOD PRESSURE: 132 MMHG | TEMPERATURE: 96.6 F | WEIGHT: 165.8 LBS

## 2020-07-27 DIAGNOSIS — M19.90 ARTHRITIS: ICD-10-CM

## 2020-07-27 DIAGNOSIS — M48.07 SPINAL STENOSIS OF LUMBOSACRAL REGION: Primary | ICD-10-CM

## 2020-07-27 PROCEDURE — 99214 OFFICE O/P EST MOD 30 MIN: CPT | Performed by: FAMILY MEDICINE

## 2020-07-27 PROCEDURE — G0463 HOSPITAL OUTPT CLINIC VISIT: HCPCS

## 2020-07-27 RX ORDER — FLUOROURACIL 50 MG/G
CREAM TOPICAL
COMMUNITY
Start: 2019-09-26 | End: 2022-08-12

## 2020-07-27 RX ORDER — OXYCODONE HYDROCHLORIDE 10 MG/1
TABLET ORAL
COMMUNITY
Start: 2020-07-11 | End: 2020-08-17

## 2020-07-27 RX ORDER — HYDROCORTISONE 2.5 %
CREAM (GRAM) TOPICAL
COMMUNITY
Start: 2019-03-26 | End: 2022-08-12

## 2020-07-27 RX ORDER — MIRTAZAPINE 7.5 MG/1
TABLET, FILM COATED ORAL
COMMUNITY
Start: 2020-06-02 | End: 2022-05-18

## 2020-07-27 RX ORDER — GABAPENTIN 300 MG/1
CAPSULE ORAL
COMMUNITY
Start: 2020-07-06 | End: 2020-10-27

## 2020-07-27 ASSESSMENT — PAIN SCALES - GENERAL: PAINLEVEL: NO PAIN (0)

## 2020-07-27 ASSESSMENT — MIFFLIN-ST. JEOR: SCORE: 1353.94

## 2020-07-27 NOTE — NURSING NOTE
"Chief Complaint   Patient presents with     Follow Up       Initial /60   Pulse 67   Temp 96.6  F (35.9  C)   Ht 1.651 m (5' 5\")   Wt 75.2 kg (165 lb 12.8 oz)   SpO2 98%   BMI 27.59 kg/m   Estimated body mass index is 27.59 kg/m  as calculated from the following:    Height as of this encounter: 1.651 m (5' 5\").    Weight as of this encounter: 75.2 kg (165 lb 12.8 oz).  Medication Reconciliation: complete  Martha Downey LPN    "

## 2020-07-27 NOTE — PROGRESS NOTES
SUBJECTIVE:   Graham Mills is a 87 year old male who presents to clinic today for the following health issues:    New Patient/Transfer of Care    Chronic Pain Follow-Up    Where in your body do you have pain? Left leg  How has your pain affected your ability to work? Not applicable  Which of these pain treatments have you tried since your last clinic visit? Other: icy hot  How well are you sleeping? Sleeps in a recliner, gets restless legs.   How has your mood been since your last visit? Better  Have you had a significant life event? No  Other aggravating factors: none  Taking medication as directed? Yes   ** doesn't have pain when taking medications, has spinal stenosis and aortic stenosis. Left leg is getting weaker medications helps with leg pain.     PHQ-9 SCORE 12/20/2016 11/14/2017   PHQ-9 Total Score 0 0     THOMAS-7 SCORE 12/20/2016 11/14/2017   Total Score 0 0     No flowsheet data found.  Encounter-Level CSA:    There are no encounter-level csa.     Patient-Level CSA:    There are no patient-level csa.         How many servings of fruits and vegetables do you eat daily?  0-1    On average, how many sweetened beverages do you drink each day (Examples: soda, juice, sweet tea, etc.  Do NOT count diet or artificially sweetened beverages)?   1    How many days per week do you exercise enough to make your heart beat faster? 7    How many minutes a day do you exercise enough to make your heart beat faster? 60 or more    How many days per week do you miss taking your medication? 0     Main has a long history of chronic pain stemming from lumbar spinal stenosis and has had previous MRI as well as surgical management. He has been followed by Pain Management and has been maintained on methadone, oxycodone, as well as gabapentin.  This has allowed him an enhanced quality of life.  Records are reviewed.    Problem list and histories reviewed & adjusted, as indicated.  Additional history: as documented    Patient Active  Problem List   Diagnosis     Benign essential hypertension     Other extrapyramidal diseases and abnormal movemen     Transient cerebral ischemia     Osteoarthrosis involving lower leg     Obsessive-compulsive disorder     Restless Legs Syndrome     Lumbago     Spinal stenosis     Advanced care planning/counseling discussion     Venous (peripheral) insufficiency     GERD (gastroesophageal reflux disease)     Hypothyroidism     Anemia in chronic kidney disease (CKD)     CKD (chronic kidney disease) stage 3, GFR 30-59 ml/min (H)     Type 2 diabetes mellitus without complication (H)     Arthritis     Past Surgical History:   Procedure Laterality Date     BACK SURGERY  2003     CHOLECYSTECTOMY  1970     COLONOSCOPY  2000     hemicolectomy      Polyps, colon     hiatal hernia  1970     TONSILLECTOMY         Social History     Tobacco Use     Smoking status: Former Smoker     Packs/day: 1.00     Years: 10.00     Pack years: 10.00     Types: Cigarettes, Cigars     Smokeless tobacco: Never Used     Tobacco comment: tried to quit-yes, year quit-1975, no passive exposure   Substance Use Topics     Alcohol use: No     Family History   Problem Relation Age of Onset     Other - See Comments Mother         Cataracts         Current Outpatient Medications   Medication Sig Dispense Refill     amLODIPine (NORVASC) 5 MG tablet Take 1 tablet (5 mg) by mouth daily , patient is overdue for office visit 90 tablet 0     cholecalciferol (VITAMIN D3) 1000 UNITS capsule 1 capsule daily Take 1 by oral route every day.       Docusate Sodium (COLACE PO) Take 100 mg by mouth 4 tabs daily       ferrous sulfate (IRON) 325 (65 FE) MG tablet Take 1 tablet (325 mg) by mouth daily (with breakfast) 30 tablet 2     fluorouracil (EFUDEX) 5 % external cream        furosemide (LASIX) 20 MG tablet TAKE 1 TABLET (20 MG) BY MOUTH DAILY AS NEEDED 30 tablet 3     Gabapentin (NEURONTIN PO) Take 300 mg by mouth Takes 2cabs every AM, 2 cap afternoon and 3 caps  every evening       hydrocortisone 2.5 % cream        hydrocortisone valerate (WEST-ROCKY) 0.2 % ointment Apply sparingly to affected area every other day 60 g 1     levothyroxine (SYNTHROID/LEVOTHROID) 75 MCG tablet TAKE 1 TABLET (75 MCG) BY MOUTH DAILY , PATIENT IS DUE FOR APPOINTMENT AND LAB 90 tablet 1     METHADONE HCL PO Take 5 mg by mouth Takes 1 tab every AM and 1.5 tabs every evening       nystatin-triamcinolone (MYCOLOG II) cream Apply topically 2 times daily as needed (rash) 30 g 1     omeprazole (PRILOSEC) 40 MG DR capsule TAKE 1 CAPSULE (40 MG) BY MOUTH DAILY. BEST 30 MIN BEFORE A MEAL 90 capsule 0     triamcinolone (KENALOG) 0.1 % ointment APPLY TOPICALLY 2 TIMES DAILY 80 g 1     fluticasone (FLONASE) 50 MCG/ACT nasal spray SPRAY 1-2 SPRAYS INTO BOTH NOSTRILS DAILY (Patient not taking: Reported on 7/27/2020) 16 g 3     gabapentin (NEURONTIN) 300 MG capsule        mirtazapine (REMERON) 7.5 MG tablet        ondansetron (ZOFRAN) 4 MG tablet TAKE 1 TABLET (4 MG) BY MOUTH EVERY 12 HOURS AS NEEDED FOR NAUSEA (Patient not taking: Reported on 7/27/2020) 20 tablet 0     oxyCODONE IR (ROXICODONE) 10 MG tablet        Allergies   Allergen Reactions     Augmentin Nausea     Flu Virus Vaccine      Levofloxacin      Levaquin       Recent Labs   Lab Test 11/14/17  1551 09/20/16  1011 02/16/16  1703  09/21/15  1356 08/06/15  1128 05/06/15  1022  01/03/14  1134   A1C 6.0 6.1* 6.4*  --   --   --   --   --   --    * 84  --   --   --  91  --    < >  --    HDL 50 43  --   --   --  54  --    < >  --    TRIG 127 116  --   --   --  166*  --    < >  --    ALT  --   --   --   --  31  --  25  --  23   CR 1.30* 1.35* 1.64*   < > 1.27* 1.20 1.36*   < > 1.15   GFRESTIMATED 53* 50* 40*   < > 54* 58* 50*   < > 61   GFRESTBLACK 64 61 49*   < > 66 70 61   < > 74   POTASSIUM 3.8 4.2 3.4   < > 3.5 4.1 4.4   < > 4.4   TSH 2.03 2.15 1.45   < > 2.57 7.92* 10.33*   < > 2.47    < > = values in this interval not displayed.      BP  "Readings from Last 3 Encounters:   07/27/20 132/60   11/14/17 136/68   12/20/16 114/60    Wt Readings from Last 3 Encounters:   07/27/20 75.2 kg (165 lb 12.8 oz)   11/14/17 78.5 kg (173 lb)   12/20/16 80.7 kg (178 lb)                    Reviewed and updated as needed this visit by clinical staff  Tobacco  Allergies  Meds  Med Hx  Surg Hx  Fam Hx  Soc Hx      Reviewed and updated as needed this visit by Provider         ROS:  Constitutional, HEENT, cardiovascular, pulmonary, gi and gu systems are negative, except as otherwise noted.    OBJECTIVE:     /60   Pulse 67   Temp 96.6  F (35.9  C)   Ht 1.651 m (5' 5\")   Wt 75.2 kg (165 lb 12.8 oz)   SpO2 98%   BMI 27.59 kg/m    Body mass index is 27.59 kg/m .  Physical Exam   Constitutional: He is oriented to person, place, and time. He appears well-developed and well-nourished. No distress.   Neurological: He is alert and oriented to person, place, and time.   Psychiatric: He has a normal mood and affect.       Other exam not repeated.  Diagnostic Test Results:  none     ASSESSMENT/PLAN:     Spinal stenosis of lumbosacral region  Long discussion was held with patient and daughter.  He is functional at age 87 on the current regimen and there are no adverse affects or signs of abuse.  Will continue as this enhances his mobility and quality of life.    Osteoarthritis  As above.    Greater than 25 minutes spent with patient, of which greater than 50% was spent reviewing pertinent medical records., counseling regarding pain management, as well as coordination of care.          Burton Jacobson MD  [facility] [place]    "

## 2020-08-17 DIAGNOSIS — M48.00 SPINAL STENOSIS, UNSPECIFIED SPINAL REGION: Primary | ICD-10-CM

## 2020-08-17 RX ORDER — OXYCODONE HYDROCHLORIDE 10 MG/1
10 TABLET ORAL 3 TIMES DAILY
Qty: 90 TABLET | Refills: 0 | Status: SHIPPED | OUTPATIENT
Start: 2020-08-17 | End: 2020-09-15

## 2020-08-17 RX ORDER — METHADONE HYDROCHLORIDE 5 MG/1
TABLET ORAL
Qty: 75 TABLET | Refills: 0 | Status: SHIPPED | OUTPATIENT
Start: 2020-08-17 | End: 2020-09-15

## 2020-08-17 NOTE — TELEPHONE ENCOUNTER
Oxycodone      Last Written Prescription Date:  Reported by patient  Last Fill Quantity: n/a,   # refills: n/a  Last Office Visit: 7/27/2020  Future Office visit:    Next 5 appointments (look out 90 days)    Oct 27, 2020  2:30 PM CDT  (Arrive by 2:15 PM)  SHORT with Burton Jacobson MD  [facility] [place] ([facility] [place] ) [address]  [place] [address]  [phone]        Methadone      Last Written Prescription Date:  unknown  Last Fill Quantity: unknown,   # refills: unknown  Last Office Visit: 7/27/2020  Future Office visit:    Next 5 appointments (look out 90 days)    Oct 27, 2020  2:30 PM CDT  (Arrive by 2:15 PM)  SHORT with Burton Jacobson MD  [facility] [place] ([facility] [place] ) [address]  [place] [address]  [phone]

## 2020-09-14 DIAGNOSIS — M48.00 SPINAL STENOSIS, UNSPECIFIED SPINAL REGION: ICD-10-CM

## 2020-09-14 NOTE — TELEPHONE ENCOUNTER
Dolophine       Last Written Prescription Date:  8/17/2020  Last Fill Quantity: 75,   # refills: 0    Roxicodone      Last Written Prescription Date:  8/17/2020  Last Fill Quantity: 90,   # refills: 0  Last Office Visit: 7/27/2020  Future Office visit:    Next 5 appointments (look out 90 days)    Oct 27, 2020  2:30 PM CDT  (Arrive by 2:15 PM)  SHORT with Burton Jacobson MD  [facility] [place] ([facility] ) [address]  [place] [address]  [phone]           Routing refill request to provider for review/approval because:  Drug not on the [facility], P or  Health refill protocol or controlled substance

## 2020-09-15 RX ORDER — METHADONE HYDROCHLORIDE 5 MG/1
TABLET ORAL
Qty: 75 TABLET | Refills: 0 | Status: SHIPPED | OUTPATIENT
Start: 2020-09-15 | End: 2020-10-15

## 2020-09-15 RX ORDER — OXYCODONE HYDROCHLORIDE 10 MG/1
10 TABLET ORAL 3 TIMES DAILY
Qty: 90 TABLET | Refills: 0 | Status: SHIPPED | OUTPATIENT
Start: 2020-09-15 | End: 2020-10-15

## 2020-10-15 DIAGNOSIS — M48.00 SPINAL STENOSIS, UNSPECIFIED SPINAL REGION: ICD-10-CM

## 2020-10-15 RX ORDER — METHADONE HYDROCHLORIDE 5 MG/1
TABLET ORAL
Qty: 75 TABLET | Refills: 0 | Status: SHIPPED | OUTPATIENT
Start: 2020-10-15 | End: 2020-11-17

## 2020-10-15 RX ORDER — OXYCODONE HYDROCHLORIDE 10 MG/1
10 TABLET ORAL 3 TIMES DAILY
Qty: 90 TABLET | Refills: 0 | Status: SHIPPED | OUTPATIENT
Start: 2020-10-15 | End: 2020-11-17

## 2020-10-15 NOTE — TELEPHONE ENCOUNTER
oxyCODONE IR (ROXICODONE) 10 MG tablet      Last Written Prescription Date:  09/15/20  Last Fill Quantity: 90,   # refills: 0  Last Office Visit: 07/27/20  Future Office visit:    Next 5 appointments (look out 90 days)    Oct 27, 2020  3:00 PM  (Arrive by 2:45 PM)  SHORT with Burton Jacobson MD  [facility] ([facility] ) [address]  [place] [address]  [phone]           Routing refill request to provider for review/approval because:  Drug not on the [facility], [facility] or  Health refill protocol or controlled substance    methadone (DOLOPHINE) 5 MG tablet      Last Written Prescription Date:  09/15/20  Last Fill Quantity: 75,   # refills: 0

## 2020-10-26 NOTE — PROGRESS NOTES
Subjective     Graham Mills is a 87 year old male who presents to clinic today for the following health issues:    HPI         Chronic Pain Follow-Up    Where in your body do you have pain? Back and left leg  How has your pain affected your ability to work? Not applicable  Which of these pain treatments have you tried since your last clinic visit? Other: NA  How well are you sleeping? Good  How has your mood been since your last visit? Better  Have you had a significant life event? No  Other aggravating factors: laying in the bed  Taking medication as directed? Yes    PHQ-9 SCORE 12/20/2016 11/14/2017   PHQ-9 Total Score 0 0     THOMAS-7 SCORE 12/20/2016 11/14/2017   Total Score 0 0     No flowsheet data found.  Encounter-Level CSA:    There are no encounter-level csa.     Patient-Level CSA:    There are no patient-level csa.         How many servings of fruits and vegetables do you eat daily?  0-1    On average, how many sweetened beverages do you drink each day (Examples: soda, juice, sweet tea, etc.  Do NOT count diet or artificially sweetened beverages)?   0    How many days per week do you exercise enough to make your heart beat faster? 7    How many minutes a day do you exercise enough to make your heart beat faster? 60 or more    How many days per week do you miss taking your medication? 0      Review of Systems   Constitutional, HEENT, cardiovascular, pulmonary, gi and gu systems are negative, except as otherwise noted.      Objective    /60 (BP Location: Right arm, Patient Position: Chair, Cuff Size: Adult Regular)   Pulse 78   Temp 98.3  F (36.8  C) (Tympanic)   Resp 18   Wt 77.5 kg (170 lb 12.8 oz)   SpO2 92%   BMI 28.42 kg/m    Body mass index is 28.42 kg/m .  Physical Exam  Vitals signs and nursing note reviewed.   Constitutional:       Appearance: He is well-developed.   Neurological:      Mental Status: He is alert and oriented to person, place, and time.   Psychiatric:         Mood and  "Affect: Mood normal.         Behavior: Behavior normal.         Thought Content: Thought content normal.        Other exam not repeated          Assessment & Plan     Spinal stenosis of lumbosacral region  Continue same medication regimen as outlined.  His symptoms are controlled. Follow up 3 months    Type 2 diabetes mellitus without complication, without long-term current use of insulin (H)  Labs drawn  - Hemoglobin A1c  - CBC with platelets differential  - Comprehensive metabolic panel  - Estimated Average Glucose     BMI:   Estimated body mass index is 28.42 kg/m  as calculated from the following:    Height as of 7/27/20: 1.651 m (5' 5\").    Weight as of this encounter: 77.5 kg (170 lb 12.8 oz).            See Patient Instructions    No follow-ups on file.    Burton Jacobson MD  [facility] [place]    "

## 2020-10-27 ENCOUNTER — OFFICE VISIT (OUTPATIENT)
Dept: FAMILY MEDICINE | Facility: OTHER | Age: 85
End: 2020-10-27
Attending: FAMILY MEDICINE
Payer: MEDICARE

## 2020-10-27 VITALS
SYSTOLIC BLOOD PRESSURE: 130 MMHG | WEIGHT: 170.8 LBS | BODY MASS INDEX: 28.42 KG/M2 | DIASTOLIC BLOOD PRESSURE: 60 MMHG | HEART RATE: 78 BPM | RESPIRATION RATE: 18 BRPM | TEMPERATURE: 98.3 F | OXYGEN SATURATION: 92 %

## 2020-10-27 DIAGNOSIS — M48.07 SPINAL STENOSIS OF LUMBOSACRAL REGION: ICD-10-CM

## 2020-10-27 DIAGNOSIS — E11.9 TYPE 2 DIABETES MELLITUS WITHOUT COMPLICATION, WITHOUT LONG-TERM CURRENT USE OF INSULIN (H): Primary | ICD-10-CM

## 2020-10-27 PROBLEM — D72.829 LEUKOCYTOSIS: Status: ACTIVE | Noted: 2018-11-16

## 2020-10-27 PROBLEM — R50.9 FEVER: Status: ACTIVE | Noted: 2018-11-16

## 2020-10-27 PROBLEM — J18.9 CAP (COMMUNITY ACQUIRED PNEUMONIA): Status: ACTIVE | Noted: 2018-11-16

## 2020-10-27 PROBLEM — A41.9 SEPSIS (H): Status: ACTIVE | Noted: 2018-11-16

## 2020-10-27 PROBLEM — R09.02 HYPOXIA: Status: ACTIVE | Noted: 2018-11-16

## 2020-10-27 PROBLEM — R06.03 ACUTE RESPIRATORY DISTRESS: Status: ACTIVE | Noted: 2018-11-16

## 2020-10-27 PROBLEM — M79.3 LIPODERMATOSCLEROSIS: Status: ACTIVE | Noted: 2017-05-11

## 2020-10-27 LAB
ALBUMIN SERPL-MCNC: 3.7 G/DL (ref 3.4–5)
ALP SERPL-CCNC: 110 U/L (ref 40–150)
ALT SERPL W P-5'-P-CCNC: 17 U/L (ref 0–70)
ANION GAP SERPL CALCULATED.3IONS-SCNC: 3 MMOL/L (ref 3–14)
AST SERPL W P-5'-P-CCNC: 22 U/L (ref 0–45)
BASOPHILS # BLD AUTO: 0.1 10E9/L (ref 0–0.2)
BASOPHILS NFR BLD AUTO: 0.8 %
BILIRUB SERPL-MCNC: 0.3 MG/DL (ref 0.2–1.3)
BUN SERPL-MCNC: 26 MG/DL (ref 7–30)
CALCIUM SERPL-MCNC: 9.1 MG/DL (ref 8.5–10.1)
CHLORIDE SERPL-SCNC: 105 MMOL/L (ref 94–109)
CO2 SERPL-SCNC: 32 MMOL/L (ref 20–32)
CREAT SERPL-MCNC: 1.35 MG/DL (ref 0.66–1.25)
DIFFERENTIAL METHOD BLD: ABNORMAL
EOSINOPHIL # BLD AUTO: 0.3 10E9/L (ref 0–0.7)
EOSINOPHIL NFR BLD AUTO: 3.4 %
ERYTHROCYTE [DISTWIDTH] IN BLOOD BY AUTOMATED COUNT: 13.2 % (ref 10–15)
EST. AVERAGE GLUCOSE BLD GHB EST-MCNC: 137 MG/DL
GFR SERPL CREATININE-BSD FRML MDRD: 47 ML/MIN/{1.73_M2}
GLUCOSE SERPL-MCNC: 110 MG/DL (ref 70–99)
HBA1C MFR BLD: 6.4 % (ref 0–5.6)
HCT VFR BLD AUTO: 37.5 % (ref 40–53)
HGB BLD-MCNC: 12.3 G/DL (ref 13.3–17.7)
IMM GRANULOCYTES # BLD: 0.1 10E9/L (ref 0–0.4)
IMM GRANULOCYTES NFR BLD: 0.7 %
LYMPHOCYTES # BLD AUTO: 2 10E9/L (ref 0.8–5.3)
LYMPHOCYTES NFR BLD AUTO: 25.7 %
MCH RBC QN AUTO: 30.6 PG (ref 26.5–33)
MCHC RBC AUTO-ENTMCNC: 32.8 G/DL (ref 31.5–36.5)
MCV RBC AUTO: 93 FL (ref 78–100)
MONOCYTES # BLD AUTO: 0.6 10E9/L (ref 0–1.3)
MONOCYTES NFR BLD AUTO: 7.7 %
NEUTROPHILS # BLD AUTO: 4.7 10E9/L (ref 1.6–8.3)
NEUTROPHILS NFR BLD AUTO: 61.7 %
NRBC # BLD AUTO: 0 10*3/UL
NRBC BLD AUTO-RTO: 0 /100
PLATELET # BLD AUTO: 185 10E9/L (ref 150–450)
POTASSIUM SERPL-SCNC: 3.9 MMOL/L (ref 3.4–5.3)
PROT SERPL-MCNC: 7.4 G/DL (ref 6.8–8.8)
RBC # BLD AUTO: 4.02 10E12/L (ref 4.4–5.9)
SODIUM SERPL-SCNC: 140 MMOL/L (ref 133–144)
WBC # BLD AUTO: 7.7 10E9/L (ref 4–11)

## 2020-10-27 PROCEDURE — G0463 HOSPITAL OUTPT CLINIC VISIT: HCPCS

## 2020-10-27 PROCEDURE — 999N001182 HC STATISTIC ESTIMATED AVERAGE GLUCOSE: Mod: ZL | Performed by: FAMILY MEDICINE

## 2020-10-27 PROCEDURE — 80053 COMPREHEN METABOLIC PANEL: CPT | Mod: ZL | Performed by: FAMILY MEDICINE

## 2020-10-27 PROCEDURE — 85025 COMPLETE CBC W/AUTO DIFF WBC: CPT | Mod: ZL | Performed by: FAMILY MEDICINE

## 2020-10-27 PROCEDURE — 99214 OFFICE O/P EST MOD 30 MIN: CPT | Performed by: FAMILY MEDICINE

## 2020-10-27 PROCEDURE — 36415 COLL VENOUS BLD VENIPUNCTURE: CPT | Mod: ZL | Performed by: FAMILY MEDICINE

## 2020-10-27 PROCEDURE — G0463 HOSPITAL OUTPT CLINIC VISIT: HCPCS | Mod: 25

## 2020-10-27 PROCEDURE — 83036 HEMOGLOBIN GLYCOSYLATED A1C: CPT | Mod: ZL | Performed by: FAMILY MEDICINE

## 2020-10-27 ASSESSMENT — PAIN SCALES - GENERAL: PAINLEVEL: NO PAIN (0)

## 2020-10-27 NOTE — LETTER
October 28, 2020      Graham Mills  [address] [place] [address]        Dear ,    We are writing to inform you of your test results.    Your test results fall within the expected range(s) or remain unchanged from previous results.  Please continue with current treatment plan.    Resulted Orders   Hemoglobin A1c   Result Value Ref Range    Hemoglobin A1C 6.4 (H) 0 - 5.6 %      Comment:      Normal <5.7% Prediabetes 5.7-6.4%  Diabetes 6.5% or higher - adopted from ADA   consensus guidelines.     CBC with platelets differential   Result Value Ref Range    WBC 7.7 4.0 - 11.0 10e9/L    RBC Count 4.02 (L) 4.4 - 5.9 10e12/L    Hemoglobin 12.3 (L) 13.3 - 17.7 g/dL    Hematocrit 37.5 (L) 40.0 - 53.0 %    MCV 93 78 - 100 fl    MCH 30.6 26.5 - 33.0 pg    MCHC 32.8 31.5 - 36.5 g/dL    RDW 13.2 10.0 - 15.0 %    Platelet Count 185 150 - 450 10e9/L    Diff Method Automated Method     % Neutrophils 61.7 %    % Lymphocytes 25.7 %    % Monocytes 7.7 %    % Eosinophils 3.4 %    % Basophils 0.8 %    % Immature Granulocytes 0.7 %    Nucleated RBCs 0 0 /100    Absolute Neutrophil 4.7 1.6 - 8.3 10e9/L    Absolute Lymphocytes 2.0 0.8 - 5.3 10e9/L    Absolute Monocytes 0.6 0.0 - 1.3 10e9/L    Absolute Eosinophils 0.3 0.0 - 0.7 10e9/L    Absolute Basophils 0.1 0.0 - 0.2 10e9/L    Abs Immature Granulocytes 0.1 0 - 0.4 10e9/L    Absolute Nucleated RBC 0.0    Comprehensive metabolic panel   Result Value Ref Range    Sodium 140 133 - 144 mmol/L    Potassium 3.9 3.4 - 5.3 mmol/L    Chloride 105 94 - 109 mmol/L    Carbon Dioxide 32 20 - 32 mmol/L    Anion Gap 3 3 - 14 mmol/L    Glucose 110 (H) 70 - 99 mg/dL    Urea Nitrogen 26 7 - 30 mg/dL    Creatinine 1.35 (H) 0.66 - 1.25 mg/dL    GFR Estimate 47 (L) >60 mL/min/[1.73_m2]      Comment:      Non  GFR Calc  Starting 12/18/2018, serum creatinine based estimated GFR (eGFR) will be   calculated using the Chronic Kidney Disease Epidemiology Collaboration   (CKD-EPI)  equation.      GFR Estimate If Black 54 (L) >60 mL/min/[1.73_m2]      Comment:       GFR Calc  Starting 12/18/2018, serum creatinine based estimated GFR (eGFR) will be   calculated using the Chronic Kidney Disease Epidemiology Collaboration   (CKD-EPI) equation.      Calcium 9.1 8.5 - 10.1 mg/dL    Bilirubin Total 0.3 0.2 - 1.3 mg/dL    Albumin 3.7 3.4 - 5.0 g/dL    Protein Total 7.4 6.8 - 8.8 g/dL    Alkaline Phosphatase 110 40 - 150 U/L    ALT 17 0 - 70 U/L    AST 22 0 - 45 U/L   Estimated Average Glucose   Result Value Ref Range    Estimated Average Glucose 137 mg/dL       If you have any questions or concerns, please call the clinic at the number listed above.       Sincerely,        Burton Jacobson MD

## 2020-10-27 NOTE — NURSING NOTE
"Chief Complaint   Patient presents with     Pain       Initial /60 (BP Location: Right arm, Patient Position: Chair, Cuff Size: Adult Regular)   Pulse 78   Temp 98.3  F (36.8  C) (Tympanic)   Resp 18   Wt 77.5 kg (170 lb 12.8 oz)   SpO2 92%   BMI 28.42 kg/m   Estimated body mass index is 28.42 kg/m  as calculated from the following:    Height as of 7/27/20: 1.651 m (5' 5\").    Weight as of this encounter: 77.5 kg (170 lb 12.8 oz).  Medication Reconciliation: complete  Tonya Rich LPN  "

## 2020-11-12 DIAGNOSIS — M48.00 SPINAL STENOSIS, UNSPECIFIED SPINAL REGION: ICD-10-CM

## 2020-11-16 NOTE — TELEPHONE ENCOUNTER
oxycodone      Last Written Prescription Date:  10/15/20  Last Fill Quantity: 90,   # refills: 0  Last Office Visit: 10/27/20  Future Office visit:    Next 5 appointments (look out 90 days)    Jan 29, 2021  3:30 PM  (Arrive by 3:15 PM)  SHORT with Burton Jacobson MD  [facility] ([facility] [address] [facility] [address]  [phone]       methadone      Last Written Prescription Date:  10/15/20Last Fill Quantity: 75,   # refills: 0  Last Office Visit: 10/15/20  Future Office visit:    Next 5 appointments (look out 90 days)    Jan 29, 2021  3:30 PM  (Arrive by 3:15 PM)  SHORT with Burton Jacobson MD  [facility] [place] ([facility] ) [address]  [place] [address]  [phone]           Routing refill request to provider for review/approval because:  Drug not on the [facility], P or [facility] refill protocol or controlled substance

## 2020-11-17 RX ORDER — OXYCODONE HYDROCHLORIDE 10 MG/1
10 TABLET ORAL 3 TIMES DAILY
Qty: 90 TABLET | Refills: 0 | Status: SHIPPED | OUTPATIENT
Start: 2020-11-17 | End: 2020-12-15

## 2020-11-17 RX ORDER — METHADONE HYDROCHLORIDE 5 MG/1
TABLET ORAL
Qty: 75 TABLET | Refills: 0 | Status: SHIPPED | OUTPATIENT
Start: 2020-11-17 | End: 2020-12-15

## 2020-12-14 DIAGNOSIS — M48.00 SPINAL STENOSIS, UNSPECIFIED SPINAL REGION: ICD-10-CM

## 2020-12-14 NOTE — TELEPHONE ENCOUNTER
Oxycodone       Last Written Prescription Date:  11/17/2020  Last Fill Quantity: 90,   # refills: 0  Last Office Visit: 10/27/2020  Future Office visit:    Next 5 appointments (look out 90 days)    Jan 29, 2021  3:30 PM  (Arrive by 3:15 PM)  SHORT with Burton Jacobson MD  [facility] ([facility] [place] ) [address]  [place] [address]  [phone]           methadone      Last Written Prescription Date:  11/17/2020  Last Fill Quantity: 75,   # refills: 0

## 2020-12-15 RX ORDER — METHADONE HYDROCHLORIDE 5 MG/1
TABLET ORAL
Qty: 75 TABLET | Refills: 0 | Status: SHIPPED | OUTPATIENT
Start: 2020-12-15 | End: 2021-01-14

## 2020-12-15 RX ORDER — OXYCODONE HYDROCHLORIDE 10 MG/1
10 TABLET ORAL 3 TIMES DAILY
Qty: 90 TABLET | Refills: 0 | Status: SHIPPED | OUTPATIENT
Start: 2020-12-15 | End: 2021-01-14

## 2020-12-30 ENCOUNTER — TRANSFERRED RECORDS (OUTPATIENT)
Dept: HEALTH INFORMATION MANAGEMENT | Facility: CLINIC | Age: 85
End: 2020-12-30

## 2020-12-30 LAB
ALT SERPL-CCNC: 9 U/L (ref 18–65)
AST SERPL-CCNC: 20 U/L (ref 10–40)
CREAT SERPL-MCNC: 1.22 MG/DL (ref 0.8–1.5)
GFR SERPL CREATININE-BSD FRML MDRD: 56 ML/MIN/1.73M2
GLUCOSE SERPL-MCNC: 101 MG/DL (ref 60–99)
POTASSIUM SERPL-SCNC: 3.7 MMOL/L (ref 3.5–5.1)
TSH SERPL-ACNC: 2.37 MIU/ML (ref 0.35–4.8)

## 2021-01-14 DIAGNOSIS — M48.00 SPINAL STENOSIS, UNSPECIFIED SPINAL REGION: ICD-10-CM

## 2021-01-14 RX ORDER — OXYCODONE HYDROCHLORIDE 10 MG/1
10 TABLET ORAL 3 TIMES DAILY
Qty: 90 TABLET | Refills: 0 | Status: SHIPPED | OUTPATIENT
Start: 2021-01-14 | End: 2021-02-15

## 2021-01-14 RX ORDER — METHADONE HYDROCHLORIDE 5 MG/1
TABLET ORAL
Qty: 75 TABLET | Refills: 0 | Status: SHIPPED | OUTPATIENT
Start: 2021-01-14 | End: 2021-02-15

## 2021-01-14 NOTE — TELEPHONE ENCOUNTER
Methadone 5 mg      Last Written Prescription Date:  12/15/2020  Last Fill Quantity: 75,   # refills: 0  Last Office Visit: 10/27/2020  Future Office visit:    Next 5 appointments (look out 90 days)    Jan 29, 2021  3:30 PM  (Arrive by 3:15 PM)  SHORT with Burton Jacobson MD  [facility] ([facility] ) [address]  [place] [address]  [phone]           Oxycodone 10 mg      Last Written Prescription Date:  12/15/2020  Last Fill Quantity: 90,   # refills: 0

## 2021-01-20 NOTE — PROGRESS NOTES
"  Assessment & Plan     Spinal stenosis of lumbosacral region  Controlled on the current regimen.  Follow up 3 months.         See Patient Instructions    No follow-ups on file.    Burton Jacobson MD  [facility] - JOSE Stevenson is a 87 year old who presents to clinic today for the following health issues  accompanied by his daughter:    HPI     Chronic Pain Follow-Up    Where in your body do you have pain? Low back  How has your pain affected your ability to work? Not applicable  Which of these pain treatments have you tried since your last clinic visit? Other: pain medication  How well are you sleeping? Good  How has your mood been since your last visit? About the same  Have you had a significant life event? No  Other aggravating factors: lying flat  Taking medication as directed? Yes    PHQ-9 SCORE 12/20/2016 11/14/2017   PHQ-9 Total Score 0 0     THOMAS-7 SCORE 12/20/2016 11/14/2017   Total Score 0 0     No flowsheet data found.  Encounter-Level CSA:    There are no encounter-level csa.     Patient-Level CSA:    There are no patient-level csa.         How many servings of fruits and vegetables do you eat daily?  0-1    On average, how many sweetened beverages do you drink each day (Examples: soda, juice, sweet tea, etc.  Do NOT count diet or artificially sweetened beverages)?   1    How many days per week do you exercise enough to make your heart beat faster? 3 or less    How many minutes a day do you exercise enough to make your heart beat faster? 9 or less    How many days per week do you miss taking your medication? 0      Review of Systems   Constitutional, HEENT, cardiovascular, pulmonary, gi and gu systems are negative, except as otherwise noted.      Objective    /64 (BP Location: Right arm, Patient Position: Sitting, Cuff Size: Adult Regular)   Pulse 72   Temp 97.4  F (36.3  C) (Tympanic)   Resp 18   Ht 1.651 m (5' 5\")   Wt 76.7 kg (169 lb)   SpO2 95%   BMI 28.12 " kg/m    Body mass index is 28.12 kg/m .  Physical Exam  Vitals signs and nursing note reviewed.   Constitutional:       Appearance: He is well-developed.   Neurological:      Mental Status: He is alert and oriented to person, place, and time.   Psychiatric:         Mood and Affect: Mood normal.         Behavior: Behavior normal.         Thought Content: Thought content normal.        Other exam not repeated    [facility]  Daughter will drop off

## 2021-01-29 ENCOUNTER — OFFICE VISIT (OUTPATIENT)
Dept: FAMILY MEDICINE | Facility: OTHER | Age: 86
End: 2021-01-29
Attending: FAMILY MEDICINE
Payer: MEDICARE

## 2021-01-29 VITALS
HEIGHT: 65 IN | RESPIRATION RATE: 18 BRPM | WEIGHT: 169 LBS | HEART RATE: 72 BPM | OXYGEN SATURATION: 95 % | BODY MASS INDEX: 28.16 KG/M2 | SYSTOLIC BLOOD PRESSURE: 130 MMHG | TEMPERATURE: 97.4 F | DIASTOLIC BLOOD PRESSURE: 64 MMHG

## 2021-01-29 DIAGNOSIS — M48.07 SPINAL STENOSIS OF LUMBOSACRAL REGION: Primary | ICD-10-CM

## 2021-01-29 PROCEDURE — 99213 OFFICE O/P EST LOW 20 MIN: CPT | Performed by: FAMILY MEDICINE

## 2021-01-29 ASSESSMENT — ANXIETY QUESTIONNAIRES
6. BECOMING EASILY ANNOYED OR IRRITABLE: NOT AT ALL
4. TROUBLE RELAXING: NOT AT ALL
2. NOT BEING ABLE TO STOP OR CONTROL WORRYING: NOT AT ALL
5. BEING SO RESTLESS THAT IT IS HARD TO SIT STILL: NOT AT ALL
3. WORRYING TOO MUCH ABOUT DIFFERENT THINGS: NOT AT ALL
1. FEELING NERVOUS, ANXIOUS, OR ON EDGE: NOT AT ALL
7. FEELING AFRAID AS IF SOMETHING AWFUL MIGHT HAPPEN: NOT AT ALL
GAD7 TOTAL SCORE: 0

## 2021-01-29 ASSESSMENT — PAIN SCALES - GENERAL: PAINLEVEL: NO PAIN (0)

## 2021-01-29 ASSESSMENT — PATIENT HEALTH QUESTIONNAIRE - PHQ9: SUM OF ALL RESPONSES TO PHQ QUESTIONS 1-9: 2

## 2021-01-29 ASSESSMENT — MIFFLIN-ST. JEOR: SCORE: 1368.46

## 2021-01-30 ASSESSMENT — ANXIETY QUESTIONNAIRES: GAD7 TOTAL SCORE: 0

## 2021-02-13 DIAGNOSIS — M48.00 SPINAL STENOSIS, UNSPECIFIED SPINAL REGION: ICD-10-CM

## 2021-02-14 NOTE — TELEPHONE ENCOUNTER
Methadone       Last Written Prescription Date:  1/14/2021  Last Fill Quantity: 75,   # refills: 0    Roxicodone       Last Written Prescription Date:  1/14/2021  Last Fill Quantity: 90,   # refills: 0  Last Office Visit: 1/29/2021  Future Office visit:    Next 5 appointments (look out 90 days)    Apr 30, 2021  3:30 PM  (Arrive by 3:15 PM)  SHORT with Burton Jacobson MD  [facility] [place] ([facility] ) [address] [place] [address]  [phone]           Routing refill request to provider for review/approval because:  Drug not on the [facility] or [facility] refill protocol or controlled substance

## 2021-02-15 RX ORDER — OXYCODONE HYDROCHLORIDE 10 MG/1
10 TABLET ORAL 3 TIMES DAILY
Qty: 90 TABLET | Refills: 0 | Status: SHIPPED | OUTPATIENT
Start: 2021-02-15 | End: 2021-03-16

## 2021-02-15 RX ORDER — METHADONE HYDROCHLORIDE 5 MG/1
TABLET ORAL
Qty: 75 TABLET | Refills: 0 | Status: SHIPPED | OUTPATIENT
Start: 2021-02-15 | End: 2021-03-16

## 2021-03-15 DIAGNOSIS — M48.00 SPINAL STENOSIS, UNSPECIFIED SPINAL REGION: ICD-10-CM

## 2021-03-15 NOTE — TELEPHONE ENCOUNTER
methadone      Last Written Prescription Date:  02/15/2021  Last Fill Quantity: 75,   # refills: 0  Last Office Visit: 01/29/2021  Future Office visit:    Next 5 appointments (look out 90 days)    Apr 30, 2021  3:30 PM  (Arrive by 3:15 PM)  SHORT with Burton Jacobson MD  [facility] [place] ([facility] [place] ) [address]  [place] [address]  [phone]           oxycodone      Last Written Prescription Date:  02/15/2021  Last Fill Quantity: 90,   # refills: 0

## 2021-03-16 RX ORDER — OXYCODONE HYDROCHLORIDE 10 MG/1
10 TABLET ORAL 3 TIMES DAILY
Qty: 90 TABLET | Refills: 0 | Status: SHIPPED | OUTPATIENT
Start: 2021-03-16 | End: 2021-04-15

## 2021-03-16 RX ORDER — METHADONE HYDROCHLORIDE 5 MG/1
TABLET ORAL
Qty: 75 TABLET | Refills: 0 | Status: SHIPPED | OUTPATIENT
Start: 2021-03-16 | End: 2021-04-15

## 2021-03-19 ENCOUNTER — TELEPHONE (OUTPATIENT)
Dept: FAMILY MEDICINE | Facility: OTHER | Age: 86
End: 2021-03-19

## 2021-04-12 NOTE — PROGRESS NOTES
"    {PROVIDER CHARTING PREFERENCE:908700}    Tania Stevenson is a 88 year old who presents for the following health issues {ACCOMPANIED BY STATEMENT (Optional):907721}    HPI     Diabetes Follow-up      How often are you checking your blood sugar? { :061379}    What concerns do you have today about your diabetes? { :086713::\"None\"}     Do you have any of these symptoms? (Select all that apply)  { :371322}    Have you had a diabetic eye exam in the last 12 months? { :656479}        BP Readings from Last 2 Encounters:   01/29/21 130/64   10/27/20 130/60     Hemoglobin A1C (%)   Date Value   10/27/2020 6.4 (H)   11/14/2017 6.0     LDL Cholesterol Calculated (mg/dL)   Date Value   11/14/2017 107 (H)   09/20/2016 84       {Reference  Diabetes Management Resources :546406}    {Reference  Diabetes Log - 7 days :375273}    Spinal stenosis of Lumbosacral region    How many servings of fruits and vegetables do you eat daily?  { :514856}    On average, how many sweetened beverages do you drink each day (Examples: soda, juice, sweet tea, etc.  Do NOT count diet or artificially sweetened beverages)?   { 1-11:926583}    How many days per week do you exercise enough to make your heart beat faster? { :381826}    How many minutes a day do you exercise enough to make your heart beat faster? { :395459}    How many days per week do you miss taking your medication? {0-7 :536888}    {additonal problems for provider to add (Optional):477353}    Review of Systems   {ROS COMP (Optional):230653}      Objective    There were no vitals taken for this visit.  There is no height or weight on file to calculate BMI.  Physical Exam   {Exam List (Optional):900812}    {Diagnostic Test Results (Optional):013997}    {AMBULATORY ATTESTATION (Optional):543040}        "

## 2021-04-13 DIAGNOSIS — M48.00 SPINAL STENOSIS, UNSPECIFIED SPINAL REGION: ICD-10-CM

## 2021-04-14 NOTE — TELEPHONE ENCOUNTER
oxyCODONE IR (ROXICODONE) 10mg      Last Written Prescription Date:  3/16/2021  Last Fill Quantity: 90,   # refills: 0  Last Office Visit: 1/29/2021  Future Office visit:    Next 5 appointments (look out 90 days)    Apr 30, 2021  3:30 PM  (Arrive by 3:15 PM)  SHORT with Burton Jacobson MD  [facility] ([facility] ) [address]  [place] [address]  [phone]           Routing refill request to provider for review/approval because:    Methadone (DOLOPHINE) 5mg      Last Written Prescription Date:  3/16/2021  Last Fill Quantity: 75,   # refills: 0  Last Office Visit: 1/29/2021  Future Office visit:    Next 5 appointments (look out 90 days)    Apr 30, 2021  3:30 PM  (Arrive by 3:15 PM)  SHORT with Burton Jacobson MD  [facility] ([facility] ) [address]  [place] [address]  [phone]           Routing refill request to provider for review/approval because:

## 2021-04-15 RX ORDER — OXYCODONE HYDROCHLORIDE 10 MG/1
10 TABLET ORAL 3 TIMES DAILY
Qty: 90 TABLET | Refills: 0 | Status: SHIPPED | OUTPATIENT
Start: 2021-04-15 | End: 2021-05-14

## 2021-04-15 RX ORDER — METHADONE HYDROCHLORIDE 5 MG/1
TABLET ORAL
Qty: 75 TABLET | Refills: 0 | Status: SHIPPED | OUTPATIENT
Start: 2021-04-15 | End: 2021-05-14

## 2021-04-28 ENCOUNTER — TRANSFERRED RECORDS (OUTPATIENT)
Dept: MULTI SPECIALTY CLINIC | Facility: CLINIC | Age: 86
End: 2021-04-28

## 2021-04-28 LAB — RETINOPATHY: NORMAL

## 2021-04-30 ENCOUNTER — OFFICE VISIT (OUTPATIENT)
Dept: FAMILY MEDICINE | Facility: OTHER | Age: 86
End: 2021-04-30
Attending: FAMILY MEDICINE
Payer: MEDICARE

## 2021-04-30 VITALS
WEIGHT: 168 LBS | HEART RATE: 76 BPM | HEIGHT: 63 IN | SYSTOLIC BLOOD PRESSURE: 124 MMHG | RESPIRATION RATE: 19 BRPM | OXYGEN SATURATION: 96 % | DIASTOLIC BLOOD PRESSURE: 68 MMHG | TEMPERATURE: 97.8 F | BODY MASS INDEX: 29.77 KG/M2

## 2021-04-30 DIAGNOSIS — E03.9 HYPOTHYROIDISM, UNSPECIFIED TYPE: ICD-10-CM

## 2021-04-30 DIAGNOSIS — E11.9 TYPE 2 DIABETES MELLITUS WITHOUT COMPLICATION, WITHOUT LONG-TERM CURRENT USE OF INSULIN (H): ICD-10-CM

## 2021-04-30 DIAGNOSIS — M48.07 SPINAL STENOSIS OF LUMBOSACRAL REGION: Primary | ICD-10-CM

## 2021-04-30 PROCEDURE — G0463 HOSPITAL OUTPT CLINIC VISIT: HCPCS | Mod: 25

## 2021-04-30 PROCEDURE — 99213 OFFICE O/P EST LOW 20 MIN: CPT | Performed by: FAMILY MEDICINE

## 2021-04-30 ASSESSMENT — PAIN SCALES - GENERAL: PAINLEVEL: NO PAIN (0)

## 2021-04-30 ASSESSMENT — MIFFLIN-ST. JEOR: SCORE: 1319.23

## 2021-04-30 NOTE — PROGRESS NOTES
"  Assessment & Plan     Spinal stenosis of lumbosacral region  Controlled on the current regimen.  Follow up 3 months.         See Patient Instructions    No follow-ups on file.    Burton Jacobson MD  [facility] Stevenson is a 87 year old who presents to clinic today for the following health issues  accompanied by his daughter:    HPI     Chronic Pain Follow-Up    Where in your body do you have pain? Low back  How has your pain affected your ability to work? Not applicable  Which of these pain treatments have you tried since your last clinic visit? Other: pain medication  How well are you sleeping? Good  How has your mood been since your last visit? About the same  Have you had a significant life event? No  Other aggravating factors: lying flat  Taking medication as directed? Yes    PHQ-9 SCORE 12/20/2016 11/14/2017 1/29/2021   PHQ-9 Total Score 0 0 2     THOMAS-7 SCORE 12/20/2016 11/14/2017 1/29/2021   Total Score 0 0 0     No flowsheet data found.  Encounter-Level CSA:    There are no encounter-level csa.     Patient-Level CSA:    There are no patient-level csa.         How many servings of fruits and vegetables do you eat daily?  0-1    On average, how many sweetened beverages do you drink each day (Examples: soda, juice, sweet tea, etc.  Do NOT count diet or artificially sweetened beverages)?   1    How many days per week do you exercise enough to make your heart beat faster? 3 or less    How many minutes a day do you exercise enough to make your heart beat faster? 9 or less    How many days per week do you miss taking your medication? 0      Review of Systems   Constitutional, HEENT, cardiovascular, pulmonary, gi and gu systems are negative, except as otherwise noted.      Objective    /68   Pulse 76   Temp 97.8  F (36.6  C) (Tympanic)   Resp 19   Ht 1.588 m (5' 2.5\")   Wt 76.2 kg (168 lb)   SpO2 96%   BMI 30.24 kg/m    Body mass index is 30.24 kg/m .  Physical " Exam  Vitals signs and nursing note reviewed.   Constitutional:       Appearance: He is well-developed.   Neurological:      Mental Status: He is alert and oriented to person, place, and time.   Psychiatric:         Mood and Affect: Mood normal.         Behavior: Behavior normal.         Thought Content: Thought content normal.        Other exam not repeated

## 2021-04-30 NOTE — NURSING NOTE
"Chief Complaint   Patient presents with     Pain       Initial /68   Pulse 76   Temp 97.8  F (36.6  C) (Tympanic)   Resp 19   Ht 1.588 m (5' 2.5\")   Wt 76.2 kg (168 lb)   SpO2 96%   BMI 30.24 kg/m   Estimated body mass index is 30.24 kg/m  as calculated from the following:    Height as of this encounter: 1.588 m (5' 2.5\").    Weight as of this encounter: 76.2 kg (168 lb).  Medication Reconciliation: complete  Michaela Flores LPN    "

## 2021-05-13 DIAGNOSIS — M48.00 SPINAL STENOSIS, UNSPECIFIED SPINAL REGION: ICD-10-CM

## 2021-05-13 DIAGNOSIS — R10.13 DYSPEPSIA: ICD-10-CM

## 2021-05-13 NOTE — TELEPHONE ENCOUNTER
Omeprazole (Prilosec) 40 mg DR capsule   Take 1 capsule (40 mg) by mouth daily       Last Written Prescription Date:  2-19-19  Last Fill Quantity: 90 capsule,   # refills: 0  Last Office Visit: 4-30-21  Future Office visit:    Next 5 appointments (look out 90 days)    Jul 30, 2021 11:00 AM  (Arrive by 10:45 AM)  SHORT with Burton Jacobson MD  [facility] ([facility] ) [address] [address]  [phone]         Oxycodone IR (Roxicodone) 10 mg tablet  Take 1 tablet (10 mg) by mouth 3 times daily.  Last Written Prescription Date:  4-15-21  Last Fill Quantity: 90,   # refills: 0  Last Office Visit: 4-30 21  Future Office visit:    Next 5 appointments (look out 90 days)    Jul 30, 2021 11:00 AM  (Arrive by 10:45 AM)  SHORT with Burton Jacobson MD  [facility] ([facility] ) [address] [address]  [phone]         Methadone (Dolophine) 5 mg tablet.  Take 1 tablet every morning and take 1.5 mg tablets at bedtime   Last Written Prescription Date:  4-15-21  Last Fill Quantity: 75 tablet,   # refills: 0  Last Office Visit: 4-3-21  Future Office visit:    Next 5 appointments (look out 90 days)    Jul 30, 2021 11:00 AM  (Arrive by 10:45 AM)  SHORT with Burton Jacobson MD  [facility] ([facility] ) [address] [address]  [phone]

## 2021-05-14 RX ORDER — OXYCODONE HYDROCHLORIDE 10 MG/1
10 TABLET ORAL 3 TIMES DAILY
Qty: 90 TABLET | Refills: 0 | Status: SHIPPED | OUTPATIENT
Start: 2021-05-14 | End: 2021-06-14

## 2021-05-14 RX ORDER — OMEPRAZOLE 40 MG/1
CAPSULE, DELAYED RELEASE ORAL
Qty: 90 CAPSULE | Refills: 0 | Status: SHIPPED | OUTPATIENT
Start: 2021-05-14 | End: 2021-11-16

## 2021-05-14 RX ORDER — METHADONE HYDROCHLORIDE 5 MG/1
TABLET ORAL
Qty: 75 TABLET | Refills: 0 | Status: SHIPPED | OUTPATIENT
Start: 2021-05-14 | End: 2021-06-14

## 2021-06-02 DIAGNOSIS — E03.9 HYPOTHYROIDISM, UNSPECIFIED TYPE: ICD-10-CM

## 2021-06-03 RX ORDER — LEVOTHYROXINE SODIUM 75 UG/1
TABLET ORAL
Qty: 90 TABLET | Refills: 0 | Status: SHIPPED | OUTPATIENT
Start: 2021-06-03 | End: 2021-09-01

## 2021-06-03 NOTE — TELEPHONE ENCOUNTER
Levothyroxine (SYNTHROID/LEVOTHROID) 75 MCG      Last Written Prescription Date:  6/7/2019  Last Fill Quantity: 90,   # refills: 1  Last Office Visit: 4/30/2021  Future Office visit:    Next 5 appointments (look out 90 days)    Jul 30, 2021 11:00 AM  (Arrive by 10:45 AM)  SHORT with Burton Jacobson MD  [facility] [place] ([facility] [place] ) [address]  [place] [address]  [phone]           Routing refill request to provider for review/approval because:

## 2021-06-12 DIAGNOSIS — M48.00 SPINAL STENOSIS, UNSPECIFIED SPINAL REGION: ICD-10-CM

## 2021-06-12 NOTE — TELEPHONE ENCOUNTER
Methadone      Last Written Prescription Date:  5.14.21  Last Fill Quantity: 75,   # refills: 0    Oxycodone      Last Written Prescription Date:  5.14.21  Last Fill Quantity: 90,   # refills: 0  Last Office Visit: 04.30.21

## 2021-06-14 RX ORDER — METHADONE HYDROCHLORIDE 5 MG/1
TABLET ORAL
Qty: 75 TABLET | Refills: 0 | Status: SHIPPED | OUTPATIENT
Start: 2021-06-14 | End: 2021-07-15

## 2021-06-14 RX ORDER — OXYCODONE HYDROCHLORIDE 10 MG/1
10 TABLET ORAL 3 TIMES DAILY
Qty: 90 TABLET | Refills: 0 | Status: SHIPPED | OUTPATIENT
Start: 2021-06-14 | End: 2021-07-15

## 2021-06-23 DIAGNOSIS — I10 BENIGN ESSENTIAL HYPERTENSION: ICD-10-CM

## 2021-06-23 DIAGNOSIS — R60.0 PERIPHERAL EDEMA: ICD-10-CM

## 2021-06-23 RX ORDER — AMLODIPINE BESYLATE 5 MG/1
TABLET ORAL
Qty: 90 TABLET | Refills: 3 | Status: SHIPPED | OUTPATIENT
Start: 2021-06-23 | End: 2022-06-10

## 2021-06-23 RX ORDER — FUROSEMIDE 20 MG
TABLET ORAL
Qty: 90 TABLET | Refills: 0 | Status: SHIPPED | OUTPATIENT
Start: 2021-06-23 | End: 2021-12-20

## 2021-07-09 DIAGNOSIS — M48.00 SPINAL STENOSIS, UNSPECIFIED SPINAL REGION: Primary | ICD-10-CM

## 2021-07-09 NOTE — TELEPHONE ENCOUNTER
GABAPENTIN 300 MG CAPS 300 Capsule      Last Written Prescription Date:  Unknown (historical)  Last Fill Quantity: ,   # refills:   Last Office Visit: 4/30/21  Future Office visit:    Next 5 appointments (look out 90 days)    Jul 30, 2021 11:00 AM  (Arrive by 10:45 AM)  SHORT with Burton Jacobson MD  [facility] ([facility] ) [address]  [place] [address]  [phone]           Routing refill request to provider for review/approval because:    Drug not on the [facility], [facility] or  Health refill protocol or controlled substance    Medication is reported historical     Dx to be associated

## 2021-07-12 DIAGNOSIS — M48.00 SPINAL STENOSIS, UNSPECIFIED SPINAL REGION: Primary | ICD-10-CM

## 2021-07-12 DIAGNOSIS — M48.00 SPINAL STENOSIS, UNSPECIFIED SPINAL REGION: ICD-10-CM

## 2021-07-12 NOTE — TELEPHONE ENCOUNTER
Med is historical.  Will be out of med tomorrow    Gabapentin (NEURONTIN PO     Last Written Prescription Date:    Last Fill Quantity: ,   # refills:   Last Office Visit: 4/30/21  Future Office visit:    Next 5 appointments (look out 90 days)    Jul 30, 2021 11:00 AM  (Arrive by 10:45 AM)  SHORT with Burton Jacobson MD  [facility] [place] ([facility] [place] ) [address]  [place] [address]  [phone]           Routing refill request to provider for review/approval because:  Drug not on the [facility], P or [facility] refill protocol or controlled substance

## 2021-07-13 RX ORDER — GABAPENTIN 300 MG/1
CAPSULE ORAL
Qty: 300 CAPSULE | Refills: 3 | Status: SHIPPED | OUTPATIENT
Start: 2021-07-13 | End: 2022-05-18

## 2021-07-13 RX ORDER — GABAPENTIN 300 MG/1
CAPSULE ORAL
Qty: 630 CAPSULE | Refills: 4 | Status: SHIPPED | OUTPATIENT
Start: 2021-07-13 | End: 2022-05-18

## 2021-07-14 NOTE — TELEPHONE ENCOUNTER
Oxycodone      Last Written Prescription Date:  6/14/21  Last Fill Quantity: 90,   # refills: 0  Last Office Visit: 4/30/21  Future Office visit:    Next 5 appointments (look out 90 days)    Jul 30, 2021 11:00 AM  (Arrive by 10:45 AM)  SHORT with Burton Jacobson MD  [facility] [place] ([facility] [place] ) [address] [place] [address]  [phone]           Routing refill request to provider for review/approval because:      Methadone      Last Written Prescription Date:  6/14/21  Last Fill Quantity: 75,   # refills: 0  Last Office Visit: 4/30/21  Future Office visit:    Next 5 appointments (look out 90 days)    Jul 30, 2021 11:00 AM  (Arrive by 10:45 AM)  SHORT with Burton Jacobson MD  [facility] [place] ([facility] ) [address] [facility] [address]  [phone]           Routing refill request to provider for review/approval because:

## 2021-07-15 RX ORDER — METHADONE HYDROCHLORIDE 5 MG/1
TABLET ORAL
Qty: 75 TABLET | Refills: 0 | Status: SHIPPED | OUTPATIENT
Start: 2021-07-15 | End: 2021-08-12

## 2021-07-15 RX ORDER — OXYCODONE HYDROCHLORIDE 10 MG/1
10 TABLET ORAL 3 TIMES DAILY
Qty: 90 TABLET | Refills: 0 | Status: SHIPPED | OUTPATIENT
Start: 2021-07-15 | End: 2021-08-12

## 2021-07-30 ENCOUNTER — OFFICE VISIT (OUTPATIENT)
Dept: FAMILY MEDICINE | Facility: OTHER | Age: 86
End: 2021-07-30
Attending: FAMILY MEDICINE
Payer: MEDICARE

## 2021-07-30 ENCOUNTER — LAB (OUTPATIENT)
Dept: LAB | Facility: OTHER | Age: 86
End: 2021-07-30
Attending: FAMILY MEDICINE
Payer: MEDICARE

## 2021-07-30 VITALS
DIASTOLIC BLOOD PRESSURE: 58 MMHG | HEART RATE: 82 BPM | BODY MASS INDEX: 30.42 KG/M2 | OXYGEN SATURATION: 94 % | TEMPERATURE: 97.4 F | RESPIRATION RATE: 20 BRPM | SYSTOLIC BLOOD PRESSURE: 127 MMHG | WEIGHT: 169 LBS

## 2021-07-30 DIAGNOSIS — E11.9 TYPE 2 DIABETES MELLITUS WITHOUT COMPLICATION, WITHOUT LONG-TERM CURRENT USE OF INSULIN (H): ICD-10-CM

## 2021-07-30 DIAGNOSIS — M48.00 SPINAL STENOSIS, UNSPECIFIED SPINAL REGION: ICD-10-CM

## 2021-07-30 DIAGNOSIS — F11.90 CHRONIC, CONTINUOUS USE OF OPIOIDS: Primary | ICD-10-CM

## 2021-07-30 DIAGNOSIS — E03.9 HYPOTHYROIDISM, UNSPECIFIED TYPE: ICD-10-CM

## 2021-07-30 LAB
ALBUMIN SERPL-MCNC: 3.8 G/DL (ref 3.4–5)
ALP SERPL-CCNC: 97 U/L (ref 40–150)
ALT SERPL W P-5'-P-CCNC: 18 U/L (ref 0–70)
ANION GAP SERPL CALCULATED.3IONS-SCNC: 8 MMOL/L (ref 3–14)
AST SERPL W P-5'-P-CCNC: 20 U/L (ref 0–45)
BASOPHILS # BLD AUTO: 0 10E3/UL (ref 0–0.2)
BASOPHILS NFR BLD AUTO: 0 %
BILIRUB SERPL-MCNC: 0.5 MG/DL (ref 0.2–1.3)
BUN SERPL-MCNC: 20 MG/DL (ref 7–30)
CALCIUM SERPL-MCNC: 9.3 MG/DL (ref 8.5–10.1)
CHLORIDE BLD-SCNC: 105 MMOL/L (ref 94–109)
CHOLEST SERPL-MCNC: 204 MG/DL
CO2 SERPL-SCNC: 26 MMOL/L (ref 20–32)
CREAT SERPL-MCNC: 1.31 MG/DL (ref 0.66–1.25)
EOSINOPHIL # BLD AUTO: 0.3 10E3/UL (ref 0–0.7)
EOSINOPHIL NFR BLD AUTO: 4 %
ERYTHROCYTE [DISTWIDTH] IN BLOOD BY AUTOMATED COUNT: 13.4 % (ref 10–15)
EST. AVERAGE GLUCOSE BLD GHB EST-MCNC: 128 MG/DL
FASTING STATUS PATIENT QL REPORTED: YES
GFR SERPL CREATININE-BSD FRML MDRD: 48 ML/MIN/1.73M2
GLUCOSE BLD-MCNC: 110 MG/DL (ref 70–99)
HBA1C MFR BLD: 6.1 % (ref 0–5.6)
HCT VFR BLD AUTO: 38 % (ref 40–53)
HDLC SERPL-MCNC: 52 MG/DL
HGB BLD-MCNC: 12.7 G/DL (ref 13.3–17.7)
LDLC SERPL CALC-MCNC: 127 MG/DL
LYMPHOCYTES # BLD AUTO: 1.9 10E3/UL (ref 0.8–5.3)
LYMPHOCYTES NFR BLD AUTO: 28 %
MCH RBC QN AUTO: 31.8 PG (ref 26.5–33)
MCHC RBC AUTO-ENTMCNC: 33.4 G/DL (ref 31.5–36.5)
MCV RBC AUTO: 95 FL (ref 78–100)
MONOCYTES # BLD AUTO: 0.6 10E3/UL (ref 0–1.3)
MONOCYTES NFR BLD AUTO: 10 %
NEUTROPHILS # BLD AUTO: 3.9 10E3/UL (ref 1.6–8.3)
NEUTROPHILS NFR BLD AUTO: 58 %
NONHDLC SERPL-MCNC: 152 MG/DL
PLATELET # BLD AUTO: 183 10E3/UL (ref 150–450)
POTASSIUM BLD-SCNC: 4.1 MMOL/L (ref 3.4–5.3)
PROT SERPL-MCNC: 7.3 G/DL (ref 6.8–8.8)
RBC # BLD AUTO: 4 10E6/UL (ref 4.4–5.9)
SODIUM SERPL-SCNC: 139 MMOL/L (ref 133–144)
TRIGL SERPL-MCNC: 123 MG/DL
TSH SERPL DL<=0.005 MIU/L-ACNC: 2.58 MU/L (ref 0.4–4)
WBC # BLD AUTO: 6.7 10E3/UL (ref 4–11)

## 2021-07-30 PROCEDURE — G0463 HOSPITAL OUTPT CLINIC VISIT: HCPCS

## 2021-07-30 PROCEDURE — 99213 OFFICE O/P EST LOW 20 MIN: CPT | Performed by: FAMILY MEDICINE

## 2021-07-30 PROCEDURE — 85025 COMPLETE CBC W/AUTO DIFF WBC: CPT | Mod: ZL

## 2021-07-30 PROCEDURE — 80053 COMPREHEN METABOLIC PANEL: CPT | Mod: ZL

## 2021-07-30 PROCEDURE — 80061 LIPID PANEL: CPT | Mod: ZL

## 2021-07-30 PROCEDURE — 83036 HEMOGLOBIN GLYCOSYLATED A1C: CPT | Mod: ZL

## 2021-07-30 PROCEDURE — 84443 ASSAY THYROID STIM HORMONE: CPT | Mod: ZL

## 2021-07-30 PROCEDURE — 36415 COLL VENOUS BLD VENIPUNCTURE: CPT | Mod: ZL

## 2021-07-30 ASSESSMENT — PAIN SCALES - GENERAL: PAINLEVEL: NO PAIN (0)

## 2021-07-30 NOTE — PROGRESS NOTES
Assessment & Plan     Spinal stenosis of lumbosacral region  Controlled on the current regimen.  Follow up 3 months.         See Patient Instructions    No follow-ups on file.    Burton Jacobson MD  [facility] - [place] Stevenson is a 87 year old who presents to clinic today for the following health issues  accompanied by his daughter:    HPI     Chronic Pain Follow-Up    Where in your body do you have pain? Low back  How has your pain affected your ability to work? Not applicable  Which of these pain treatments have you tried since your last clinic visit? Other: pain medication  How well are you sleeping? Good  How has your mood been since your last visit? About the same  Have you had a significant life event? No  Other aggravating factors: lying flat  Taking medication as directed? Yes    PHQ-9 SCORE 12/20/2016 11/14/2017 1/29/2021   PHQ-9 Total Score 0 0 2     THOMAS-7 SCORE 12/20/2016 11/14/2017 1/29/2021   Total Score 0 0 0     No flowsheet data found.  Encounter-Level CSA:    There are no encounter-level csa.     Patient-Level CSA:    There are no patient-level csa.         How many servings of fruits and vegetables do you eat daily?  0-1    On average, how many sweetened beverages do you drink each day (Examples: soda, juice, sweet tea, etc.  Do NOT count diet or artificially sweetened beverages)?   1    How many days per week do you exercise enough to make your heart beat faster? 3 or less    How many minutes a day do you exercise enough to make your heart beat faster? 9 or less    How many days per week do you miss taking your medication? 0      Review of Systems   Constitutional, HEENT, cardiovascular, pulmonary, gi and gu systems are negative, except as otherwise noted.      Objective    /58 (BP Location: Left arm, Patient Position: Sitting, Cuff Size: Adult Regular)   Pulse 82   Temp 97.4  F (36.3  C) (Tympanic)   Resp 20   Wt 76.7 kg (169 lb)   SpO2 94%   BMI  30.42 kg/m    Body mass index is 30.42 kg/m .  Physical Exam  Vitals and nursing note reviewed.   Constitutional:       Appearance: He is well-developed.   Neurological:      Mental Status: He is alert and oriented to person, place, and time.   Psychiatric:         Mood and Affect: Mood normal.         Behavior: Behavior normal.         Thought Content: Thought content normal.        Other exam not repeated

## 2021-07-30 NOTE — NURSING NOTE
"Chief Complaint   Patient presents with     Chronic Disease Management       Initial /58 (BP Location: Left arm, Patient Position: Sitting, Cuff Size: Adult Regular)   Pulse 82   Temp 97.4  F (36.3  C) (Tympanic)   Resp 20   Wt 76.7 kg (169 lb)   SpO2 94%   BMI 30.42 kg/m   Estimated body mass index is 30.42 kg/m  as calculated from the following:    Height as of 4/30/21: 1.588 m (5' 2.5\").    Weight as of this encounter: 76.7 kg (169 lb).  Medication Reconciliation: complete  Stephanie Mera LPN  "

## 2021-07-30 NOTE — LETTER
Opioid / Opioid Plus Controlled Substance Agreement    This is an agreement between you and your provider about the safe and appropriate use of controlled substance/opioids prescribed by your care team. Controlled substances are medicines that can cause physical and mental dependence (abuse).    There are strict laws about having and using these medicines. We here at [facility] are committing to working with you in your efforts to get better. To support you in this work, we ll help you schedule regular office appointments for medicine refills. If we must cancel or change your appointment for any reason, we ll make sure you have enough medicine to last until your next appointment.     As a Provider, I will:    Listen carefully to your concerns and treat you with respect.     Recommend a treatment plan that I believe is in your best interest. This plan may involve therapies other than opioid pain medication.     Talk with you often about the possible benefits, and the risk of harm of any medicine that we prescribe for you.     Provide a plan on how to taper (discontinue or go off) using this medicine if the decision is made to stop its use.    As a Patient, I understand that opioid(s):     Are a controlled substance prescribed by my care team to help me function or work and manage my condition(s).     Are strong medicines and can cause serious side effects such as:    Drowsiness, which can seriously affect my driving ability    A lower breathing rate, enough to cause death    Harm to my thinking ability     Depression     Abuse of and addiction to this medicine    Need to be taken exactly as prescribed. Combining opioids with certain medicines or chemicals (such as illegal drugs, sedatives, sleeping pills, and benzodiazepines) can be dangerous or even fatal. If I stop opioids suddenly, I may have severe withdrawal symptoms.    Do not work for all types of pain nor for all patients. If they re not helpful, I may  be asked to stop them.        The risks, benefits and side effects of these medicine(s) were explained to me. I agree that:  1. I will take part in other treatments as advised by my care team. This may be psychiatry or counseling, physical therapy, behavioral therapy, group treatment or a referral to a specialist.     2. I will keep all my appointments. I understand that this is part of the monitoring of opioids. My care team may require an office visit for EVERY opioid/controlled substance refill. If I miss appointments or don t follow instructions, my care team may stop my medicine.    3. I will take my medicines as prescribed. I will not change the dose or schedule unless my care team tells me to. There will be no refills if I run out early.     4. I may be asked to come to the clinic and complete a urine drug test or complete a pill count at any time. If I don t give a urine sample or participate in a pill count, the care team may stop my medicine.    5. I will only receive prescriptions from this clinic for chronic pain. If I am treated by another provider for acute pain issues, I will tell them that I am taking opioid pain medication for chronic pain and that I have a treatment agreement with this provider. I will inform my [facility] care team within one business day if I am given a prescription for any pain medication by another healthcare provider. My [facility] care team can contact other providers and pharmacists about my use of any medicines.    6. It is up to me to make sure that I don t run out of my medicines on weekends or holidays. If my care team is willing to refill my opioid prescription without a visit, I must request refills only during office hours. Refills may take up to 3 business days to process. I will use one pharmacy to fill all my opioid and other controlled substance prescriptions. I will notify the clinic about any changes to my insurance or medication  availability.    7. I am responsible for my prescriptions. If the medicine/prescription is lost, stolen or destroyed, it will not be replaced. I also agree not to share controlled substance medicines with anyone.    8. I am aware I should not use any illegal or recreational drugs. I agree not to drink alcohol unless my care team says I can.       9. If I enroll in the [place] [facility] Cannabis program, I will tell my care team prior to my next refill.     10. I will tell my care team right away if I become pregnant, have a new medical problem treated outside of my regular clinic, or have a change in my medications.    11. I understand that this medicine can affect my thinking, judgment and reaction time. Alcohol and drugs affect the brain and body, which can affect the safety of my driving. Being under the influence of alcohol or drugs can affect my decision-making, behaviors, personal safety, and the safety of others. Driving while impaired (DWI) can occur if a person is driving, operating, or in physical control of a car, motorcycle, boat, snowmobile, ATV, motorbike, off-road vehicle, or any other motor vehicle ([place] Statute 169A.20). I understand the risk if I choose to drive or operate any vehicle or machinery.    I understand that if I do not follow any of the conditions above, my prescriptions or treatment may be stopped or changed.          Opioids  What You Need to Know    What are opioids?   Opioids are pain medicines that must be prescribed by a doctor. They are also known as narcotics.     Examples are:   1. morphine (MS Contin, Monica)  2. oxycodone (Oxycontin)  3. oxycodone and acetaminophen (Percocet)  4. hydrocodone and acetaminophen (Vicodin, Norco)   5. fentanyl patch (Duragesic)   6. hydromorphone (Dilaudid)   7. methadone  8. codeine (Tylenol #3)     What do opioids do well?   Opioids are best for severe short-term pain such as after a surgery or injury. They may work well for cancer pain. They may  help some people with long-lasting (chronic) pain.     What do opioids NOT do well?   Opioids never get rid of pain entirely, and they don t work well for most patients with chronic pain. Opioids don t reduce swelling, one of the causes of pain.                                    Other ways to manage chronic pain and improve function include:       Treat the health problem that may be causing pain    Anti-inflammation medicines, which reduce swelling and tenderness, such as ibuprofen (Advil, Motrin) or naproxen (Aleve)    Acetaminophen (Tylenol)    Antidepressants and anti-seizure medicines, especially for nerve pain    Topical treatments such as patches or creams    Injections or nerve blocks    Chiropractic or osteopathic treatment    Acupuncture, massage, deep breathing, meditation, visual imagery, aromatherapy    Use heat or ice at the pain site    Physical therapy     Exercise    Stop smoking    Take part in therapy       Risks and side effects     Talk to your doctor before you start or decide to keep taking opioids. Possible side effects include:      Lowering your breathing rate enough to cause death    Overdose, including death, especially if taking higher than prescribed doses    Worse depression symptoms; less pleasure in things you usually enjoy    Feeling tired or sluggish    Slower thoughts or cloudy thinking    Being more sensitive to pain over time; pain is harder to control    Trouble sleeping or restless sleep    Changes in hormone levels (for example, less testosterone)    Changes in sex drive or ability to have sex    Constipation    Unsafe driving    Itching and sweating    Dizziness    Nausea, throwing up and dry mouth    What else should I know about opioids?    Opioids may lead to dependence, tolerance, or addiction.      Dependence means that if you stop or reduce the medicine too quickly, you will have withdrawal symptoms. These include loose poop (diarrhea), jitters, flu-like symptoms,  nervousness and tremors. Dependence is not the same as addiction.                       Tolerance means needing higher doses over time to get the same effect. This may increase the chance of serious side effects.      Addiction is when people improperly use a substance that harms their body, their mind or their relations with others. Use of opiates can cause a relapse of addiction if you have a history of drug or alcohol abuse.      People who have used opioids for a long time may have a lower quality of life, worse depression, higher levels of pain and more visits to doctors.    You can overdose on opioids. Take these steps to lower your risk of overdose:    1. Recognize the signs:  Signs of overdose include decrease or loss of consciousness (blackout), slowed breathing, trouble waking up and blue lips. If someone is worried about overdose, they should call 911.    2. Talk to your doctor about Narcan (naloxone).   If you are at risk for overdose, you may be given a prescription for Narcan. This medicine very quickly reverses the effects of opioids.   If you overdose, a friend or family member can give you Narcan while waiting for the ambulance. They need to know the signs of overdose and how to give Narcan.     3. Don't use alcohol or street drugs.   Taking them with opioids can cause death.    4. Do not take any of these medicines unless your doctor says it s OK. Taking these with opioids can cause death:    Benzodiazepines, such as lorazepam (Ativan), alprazolam (Xanax) or diazepam (Valium)    Muscle relaxers, such as cyclobenzaprine (Flexeril)    Sleeping pills like zolpidem (Ambien)     Other opioids      How to keep you and other people safe while taking opioids:    1. Never share your opioids with others.  Opioid medicines are regulated by the Drug Enforcement Agency ([employer]DIYA). Selling or sharing medications is a criminal act.    2. Be sure to store opioids in a secure place, locked up if possible. Young children  can easily swallow them and overdose.    3. When you are traveling with your medicines, keep them in the original bottles. If you use a pill box, be sure you also carry a copy of your medicine list from your clinic or pharmacy.    4. Safe disposal of opioids    Most pharmacies have places to get rid of medicine, called disposal kiosks. Medicine disposal options are also available in every [facility]. Search your county and  medication disposal  to find more options. You can find more details at:  [email or web address]     I agree that my provider, clinic care team, and pharmacy may work with any city, state or federal law enforcement agency that investigates the misuse, sale, or other diversion of my controlled medicine. I will allow my provider to discuss my care with, or share a copy of, this agreement with any other treating provider, pharmacy or emergency room where I receive care.    I have read this agreement and have asked questions about anything I did not understand.    _______________________________________________________  Patient Signature - [place] _____________________                   Date     _______________________________________________________  Provider Signature - Burton Jacobson MD   _____________________                   Date     _______________________________________________________  Witness Signature (required if provider not present while patient signing)   _____________________                   Date

## 2021-08-11 DIAGNOSIS — M48.00 SPINAL STENOSIS, UNSPECIFIED SPINAL REGION: ICD-10-CM

## 2021-08-11 NOTE — TELEPHONE ENCOUNTER
methadone (DOLOPHINE) 5 MG tablet  Last Written Prescription Date:  7/15/21  Last Fill Quantity: 75,  # refills: 0     oxyCODONE IR (ROXICODONE) 10 MG tablet  Last Written Prescription Date:  7/15/21  Last Fill Quantity: 90,  # refills: 0    Last office visit: 7/30/2021   Future Office Visit:   Next 5 appointments (look out 90 days)    Nov 05, 2021  2:00 PM  (Arrive by 1:45 PM)  SHORT with Burton Jacobson MD  [facility] ([facility] [place] ) [address]  [place] [address]  [phone]           Routing refill request to provider for review/approval because:  Drug not on the [facility] refill protocol

## 2021-08-12 RX ORDER — OXYCODONE HYDROCHLORIDE 10 MG/1
10 TABLET ORAL 3 TIMES DAILY
Qty: 90 TABLET | Refills: 0 | Status: SHIPPED | OUTPATIENT
Start: 2021-08-12 | End: 2021-09-15

## 2021-08-12 RX ORDER — METHADONE HYDROCHLORIDE 5 MG/1
TABLET ORAL
Qty: 75 TABLET | Refills: 0 | Status: SHIPPED | OUTPATIENT
Start: 2021-08-12 | End: 2021-09-15

## 2021-08-30 DIAGNOSIS — E03.9 HYPOTHYROIDISM, UNSPECIFIED TYPE: ICD-10-CM

## 2021-09-01 RX ORDER — LEVOTHYROXINE SODIUM 75 UG/1
TABLET ORAL
Qty: 90 TABLET | Refills: 2 | Status: SHIPPED | OUTPATIENT
Start: 2021-09-01 | End: 2022-06-01

## 2021-09-11 DIAGNOSIS — M48.00 SPINAL STENOSIS, UNSPECIFIED SPINAL REGION: ICD-10-CM

## 2021-09-14 NOTE — TELEPHONE ENCOUNTER
oxycodone      Last Written Prescription Date:  8/12/21  Last Fill Quantity: 90,   # refills: 0  Last Office Visit: 7/30/21  Future Office visit:    Next 5 appointments (look out 90 days)    Nov 05, 2021  2:00 PM  (Arrive by 1:45 PM)  SHORT with Burton Jacobson MD  [facility] ([facility] ) [address]  [place] [address]  [phone]           Routing refill request to provider for review/approval because:  Drug not on the [facility], [facility] or [facility] refill protocol or controlled substance  methadone      Last Written Prescription Date:  8/12/21  Last Fill Quantity: 75,   # refills: 0

## 2021-09-15 RX ORDER — OXYCODONE HYDROCHLORIDE 10 MG/1
10 TABLET ORAL 3 TIMES DAILY
Qty: 90 TABLET | Refills: 0 | Status: SHIPPED | OUTPATIENT
Start: 2021-09-15 | End: 2021-10-15

## 2021-09-15 RX ORDER — METHADONE HYDROCHLORIDE 5 MG/1
TABLET ORAL
Qty: 75 TABLET | Refills: 0 | Status: SHIPPED | OUTPATIENT
Start: 2021-09-15 | End: 2021-10-15

## 2021-10-01 ENCOUNTER — TRANSFERRED RECORDS (OUTPATIENT)
Dept: MULTI SPECIALTY CLINIC | Facility: CLINIC | Age: 86
End: 2021-10-01

## 2021-10-01 LAB — RETINOPATHY: NORMAL

## 2021-10-15 DIAGNOSIS — M48.00 SPINAL STENOSIS, UNSPECIFIED SPINAL REGION: ICD-10-CM

## 2021-10-15 RX ORDER — METHADONE HYDROCHLORIDE 5 MG/1
TABLET ORAL
Qty: 75 TABLET | Refills: 0 | Status: SHIPPED | OUTPATIENT
Start: 2021-10-15 | End: 2021-11-12

## 2021-10-15 RX ORDER — OXYCODONE HYDROCHLORIDE 10 MG/1
10 TABLET ORAL 3 TIMES DAILY
Qty: 90 TABLET | Refills: 0 | Status: SHIPPED | OUTPATIENT
Start: 2021-10-15 | End: 2021-11-12

## 2021-10-15 NOTE — TELEPHONE ENCOUNTER
Patient need's this before the weekend      methadone (DOLOPHINE) 5 MG tablet      Last Written Prescription Date:  9/15/21  Last Fill Quantity: 75,   # refills: 0  Last Office Visit: 7/30/21  Future Office visit:    Next 5 appointments (look out 90 days)    Nov 05, 2021  2:00 PM  (Arrive by 1:45 PM)  SHORT with Burton Jacobson MD  [facility] ([facility] ) [address]  [facility] [address]  [phone]           Routing refill request to provider for review/approval because:  Drug not on the [facility] or [facility] refill protocol or controlled substance      oxyCODONE IR (ROXICODONE) 10 MG tablet      Last Written Prescription Date:  9/15/21  Last Fill Quantity: 90,   # refills: 0  Last Office Visit: 7/30/21  Future Office visit:    Next 5 appointments (look out 90 days)    Nov 05, 2021  2:00 PM  (Arrive by 1:45 PM)  SHORT with Burton Jacobson MD  [facility] ([facility] [place] ) [address]  [place] [address]  [phone]           Routing refill request to provider for review/approval because:  Drug not on the [facility] or [facility] refill protocol or controlled substance

## 2021-11-04 NOTE — PROGRESS NOTES
"  Assessment & Plan     Spinal stenosis, unspecified spinal region  Previous records reviewed.  Stable on the current regimen.  Will continue.    Chronic, continuous use of opioids  As above           BMI:   Estimated body mass index is 30.42 kg/m  as calculated from the following:    Height as of 4/30/21: 1.588 m (5' 2.5\").    Weight as of 7/30/21: 76.7 kg (169 lb).       See Patient Instructions    No follow-ups on file.    Burton Jacobson MD  [facility] JOSE Stevenson is a 88 year old who presents for the following health issues  accompanied by his daughter.    HPI     Pain History:Pt fell x2 in last 1.5 weeks   When did you first notice your pain? - More than 6 weeks   Have you seen this provider for your pain in the past?   Yes   Where in your body do you have pain? Neck  low back  Are you seeing anyone else for your pain? No    PHQ-9 SCORE 12/20/2016 11/14/2017 1/29/2021   PHQ-9 Total Score 0 0 2       THOMAS-7 SCORE 12/20/2016 11/14/2017 1/29/2021   Total Score 0 0 0       Chronic Pain Follow Up:    Location of pain: low back pain  Analgesia/pain control:    - Recent changes: none    - Overall control: Tolerable with discomfort    - Current treatments: medications   Adherence:     - Do you ever take more pain medicine than prescribed? No    - When did you take your last dose of pain medicine?  prior   Adverse effects: No   PDMP Review     None        Last CSA Agreement:   Patient-Level CSA:    There are no patient-level csa.       Last UDS:     Main is seen in follow up of chronic low back pain and continuous use of opioids.  His pain is controlled on the current regimen.  He has had previous evaluation of his pain as well as Pain Management consultation.    Review of Systems   Constitutional, HEENT, cardiovascular, pulmonary, gi and gu systems are negative, except as otherwise noted.      Objective    /60   Pulse 70   Temp 98  F (36.7  C)   Resp 18   SpO2 96%   There is " no height or weight on file to calculate BMI.  Physical Exam  Vitals and nursing note reviewed.   Constitutional:       Appearance: He is well-developed.   Neurological:      Mental Status: He is alert and oriented to person, place, and time.   Psychiatric:         Mood and Affect: Mood normal.         Behavior: Behavior normal.         Thought Content: Thought content normal.        Other exam not repeated    Lab on 07/30/2021   Component Date Value Ref Range Status     Estimated Average Glucose 07/30/2021 128  mg/dL Final     Hemoglobin A1C 07/30/2021 6.1* 0.0 - 5.6 % Final    Normal <5.7%   Prediabetes 5.7-6.4%    Diabetes 6.5% or higher     Note: Adopted from ADA consensus guidelines.     TSH 07/30/2021 2.58  0.40 - 4.00 mU/L Final     Cholesterol 07/30/2021 204* <200 mg/dL Final    Age 0-19 years  Desirable: <170 mg/dL  Borderline high:  170-199 mg/dl  High:            >199 mg/dl    Age 20 years and older  Desirable: <200 mg/dL     Triglycerides 07/30/2021 123  <150 mg/dL Final    0-9 years:  Normal:    Less than 75 mg/dL  Borderline high:  75-99 mg/dL  High:             Greater than or equal to 100 mg/dL    0-19 years:  Normal:    Less than 90 mg/dL  Borderline high:   mg/dL  High:             Greater than or equal to 130 mg/dL    20 years and older:  Normal:    Less than 150 mg/dL  Borderline high:  150-199 mg/dL  High:             200-499 mg/dL  Very high:   Greater than or equal to 500 mg/dL     Direct Measure HDL 07/30/2021 52  >=40 mg/dL Final    0-19 years:       Greater than or equal to 45 mg/dL   Low: Less than 40 mg/dL   Borderline low: 40-44 mg/dL     20 years and older:   Female: Greater than or equal to 50 mg/dL   Male:   Greater than or equal to 40 mg/dL          LDL Cholesterol Calculated 07/30/2021 127* <=100 mg/dL Final    Age 0-19 years:  Desirable: 0-110 mg/dL   Borderline high: 110-129 mg/dL   High: >= 130 mg/dL    Age 20 years and older:  Desirable: <100mg/dL  Above desirable: 100-129  mg/dL   Borderline high: 130-159 mg/dL   High: 160-189 mg/dL   Very high: >= 190 mg/dL     Non HDL Cholesterol 07/30/2021 152* <130 mg/dL Final    0-19 years:  Desirable:          Less than 120 mg/dL  Borderline high:   120-144 mg/dL  High:                   Greater than or equal to 145 mg/dL    20 years and older:  Desirable:          130 mg/dL  Above Desirable: 130-159 mg/dL  Borderline high:   160-189 mg/dL  High:               190-219 mg/dL  Very high:     Greater than or equal to 220 mg/dL     Patient Fasting > 8hrs? 07/30/2021 Yes   Final     Sodium 07/30/2021 139  133 - 144 mmol/L Final     Potassium 07/30/2021 4.1  3.4 - 5.3 mmol/L Final     Chloride 07/30/2021 105  94 - 109 mmol/L Final     Carbon Dioxide (CO2) 07/30/2021 26  20 - 32 mmol/L Final     Anion Gap 07/30/2021 8  3 - 14 mmol/L Final     Urea Nitrogen 07/30/2021 20  7 - 30 mg/dL Final     Creatinine 07/30/2021 1.31* 0.66 - 1.25 mg/dL Final     Calcium 07/30/2021 9.3  8.5 - 10.1 mg/dL Final     Glucose 07/30/2021 110* 70 - 99 mg/dL Final     Alkaline Phosphatase 07/30/2021 97  40 - 150 U/L Final     AST 07/30/2021 20  0 - 45 U/L Final     ALT 07/30/2021 18  0 - 70 U/L Final     Protein Total 07/30/2021 7.3  6.8 - 8.8 g/dL Final     Albumin 07/30/2021 3.8  3.4 - 5.0 g/dL Final     Bilirubin Total 07/30/2021 0.5  0.2 - 1.3 mg/dL Final     GFR Estimate 07/30/2021 48* >60 mL/min/1.73m2 Final    As of July 11, 2021, eGFR is calculated by the CKD-EPI creatinine equation, without race adjustment. eGFR can be influenced by muscle mass, exercise, and diet. The reported eGFR is an estimation only and is only applicable if the renal function is stable.     WBC Count 07/30/2021 6.7  4.0 - 11.0 10e3/uL Final     RBC Count 07/30/2021 4.00* 4.40 - 5.90 10e6/uL Final     Hemoglobin 07/30/2021 12.7* 13.3 - 17.7 g/dL Final     Hematocrit 07/30/2021 38.0* 40.0 - 53.0 % Final     MCV 07/30/2021 95  78 - 100 fL Final     MCH 07/30/2021 31.8  26.5 - 33.0 pg Final      MCHC 07/30/2021 33.4  31.5 - 36.5 g/dL Final     RDW 07/30/2021 13.4  10.0 - 15.0 % Final     Platelet Count 07/30/2021 183  150 - 450 10e3/uL Final     % Neutrophils 07/30/2021 58  % Final     % Lymphocytes 07/30/2021 28  % Final     % Monocytes 07/30/2021 10  % Final     % Eosinophils 07/30/2021 4  % Final     % Basophils 07/30/2021 0  % Final     Absolute Neutrophils 07/30/2021 3.9  1.6 - 8.3 10e3/uL Final     Absolute Lymphocytes 07/30/2021 1.9  0.8 - 5.3 10e3/uL Final     Absolute Monocytes 07/30/2021 0.6  0.0 - 1.3 10e3/uL Final     Absolute Eosinophils 07/30/2021 0.3  0.0 - 0.7 10e3/uL Final     Absolute Basophils 07/30/2021 0.0  0.0 - 0.2 10e3/uL Final

## 2021-11-05 ENCOUNTER — OFFICE VISIT (OUTPATIENT)
Dept: FAMILY MEDICINE | Facility: OTHER | Age: 86
End: 2021-11-05
Attending: FAMILY MEDICINE
Payer: MEDICARE

## 2021-11-05 VITALS
TEMPERATURE: 98 F | HEART RATE: 70 BPM | DIASTOLIC BLOOD PRESSURE: 60 MMHG | OXYGEN SATURATION: 96 % | SYSTOLIC BLOOD PRESSURE: 100 MMHG | RESPIRATION RATE: 18 BRPM

## 2021-11-05 DIAGNOSIS — M48.00 SPINAL STENOSIS, UNSPECIFIED SPINAL REGION: Primary | ICD-10-CM

## 2021-11-05 DIAGNOSIS — F11.90 CHRONIC, CONTINUOUS USE OF OPIOIDS: ICD-10-CM

## 2021-11-05 PROCEDURE — 99214 OFFICE O/P EST MOD 30 MIN: CPT | Performed by: FAMILY MEDICINE

## 2021-11-05 ASSESSMENT — PAIN SCALES - GENERAL: PAINLEVEL: MODERATE PAIN (5)

## 2021-11-05 NOTE — NURSING NOTE
"Chief Complaint   Patient presents with     Pain       Initial /60   Pulse 70   Temp 98  F (36.7  C)   Resp 18   SpO2 96%  Estimated body mass index is 30.42 kg/m  as calculated from the following:    Height as of 4/30/21: 1.588 m (5' 2.5\").    Weight as of 7/30/21: 76.7 kg (169 lb).  Medication Reconciliation: lonnie Suarez  "

## 2021-11-12 DIAGNOSIS — M48.00 SPINAL STENOSIS, UNSPECIFIED SPINAL REGION: ICD-10-CM

## 2021-11-12 RX ORDER — METHADONE HYDROCHLORIDE 5 MG/1
TABLET ORAL
Qty: 75 TABLET | Refills: 0 | Status: SHIPPED | OUTPATIENT
Start: 2021-11-12 | End: 2021-12-15

## 2021-11-12 RX ORDER — OXYCODONE HYDROCHLORIDE 10 MG/1
10 TABLET ORAL 3 TIMES DAILY
Qty: 90 TABLET | Refills: 0 | Status: SHIPPED | OUTPATIENT
Start: 2021-11-12 | End: 2021-12-15

## 2021-11-12 NOTE — TELEPHONE ENCOUNTER
Methadone      Last Written Prescription Date:  10/15/21  Last Fill Quantity: 75,   # refills: 0  Last Office Visit: 07/30/21  Future Office visit:       Oxycodone      Last Written Prescription Date:  10/15/21  Last Fill Quantity: 90,   # refills: 0  Last Office Visit: 07/30/21  Future Office visit:

## 2021-11-16 DIAGNOSIS — R10.13 DYSPEPSIA: ICD-10-CM

## 2021-11-16 RX ORDER — OMEPRAZOLE 40 MG/1
CAPSULE, DELAYED RELEASE ORAL
Qty: 90 CAPSULE | Refills: 2 | Status: SHIPPED | OUTPATIENT
Start: 2021-11-16 | End: 2022-05-18

## 2021-12-13 ENCOUNTER — TELEPHONE (OUTPATIENT)
Dept: FAMILY MEDICINE | Facility: OTHER | Age: 86
End: 2021-12-13
Payer: MEDICARE

## 2021-12-13 DIAGNOSIS — M48.00 SPINAL STENOSIS, UNSPECIFIED SPINAL REGION: ICD-10-CM

## 2021-12-14 NOTE — TELEPHONE ENCOUNTER
Oxycodone      Last Written Prescription Date:  11/12/21  Last Fill Quantity: 90,   # refills: 0  Last Office Visit: 11/5/21  Future Office visit:       Routing refill request to provider for review/approval because:      Methadone      Last Written Prescription Date:  11/12/21  Last Fill Quantity: 75,   # refills: 0  Last Office Visit: 11/5/21  Future Office visit:       Routing refill request to provider for review/approval because:

## 2021-12-15 RX ORDER — OXYCODONE HYDROCHLORIDE 10 MG/1
10 TABLET ORAL 3 TIMES DAILY
Qty: 90 TABLET | Refills: 0 | Status: SHIPPED | OUTPATIENT
Start: 2021-12-15 | End: 2022-01-11

## 2021-12-15 RX ORDER — METHADONE HYDROCHLORIDE 5 MG/1
TABLET ORAL
Qty: 75 TABLET | Refills: 0 | Status: SHIPPED | OUTPATIENT
Start: 2021-12-15 | End: 2022-01-11

## 2021-12-15 NOTE — TELEPHONE ENCOUNTER
Spoke with  and will fill for three months from last office visit. If on contract can have appt.for this.    Did update daughter the process will fill 3 months from last office visit. Did not discuss if he was on pain contract as do not see consent to discuss on file.    She verbalized understanding.        Chanel Oneal RN

## 2021-12-15 NOTE — TELEPHONE ENCOUNTER
Pt daughter calling and needs the below medications.She is wondering what she should do for next month since he does not establish care until April with .      Call back daughter at [phone] Gretchen

## 2021-12-20 DIAGNOSIS — R60.0 PERIPHERAL EDEMA: ICD-10-CM

## 2021-12-21 RX ORDER — FUROSEMIDE 20 MG
TABLET ORAL
Qty: 90 TABLET | Refills: 1 | Status: SHIPPED | OUTPATIENT
Start: 2021-12-21 | End: 2022-06-10

## 2022-01-01 ENCOUNTER — HEALTH MAINTENANCE LETTER (OUTPATIENT)
Age: 87
End: 2022-01-01

## 2022-01-01 ENCOUNTER — PATIENT OUTREACH (OUTPATIENT)
Dept: CARE COORDINATION | Facility: OTHER | Age: 87
End: 2022-01-01

## 2022-01-01 ENCOUNTER — ANCILLARY PROCEDURE (OUTPATIENT)
Dept: GENERAL RADIOLOGY | Facility: OTHER | Age: 87
End: 2022-01-01
Attending: NURSE PRACTITIONER
Payer: MEDICARE

## 2022-01-01 ENCOUNTER — OFFICE VISIT (OUTPATIENT)
Dept: FAMILY MEDICINE | Facility: OTHER | Age: 87
End: 2022-01-01
Attending: NURSE PRACTITIONER
Payer: MEDICARE

## 2022-01-01 ENCOUNTER — CARE COORDINATION (OUTPATIENT)
Dept: CARE COORDINATION | Facility: OTHER | Age: 87
End: 2022-01-01

## 2022-01-01 ENCOUNTER — OFFICE VISIT (OUTPATIENT)
Dept: FAMILY MEDICINE | Facility: OTHER | Age: 87
End: 2022-01-01
Attending: FAMILY MEDICINE
Payer: MEDICARE

## 2022-01-01 VITALS
TEMPERATURE: 99 F | RESPIRATION RATE: 16 BRPM | BODY MASS INDEX: 31.36 KG/M2 | WEIGHT: 177 LBS | SYSTOLIC BLOOD PRESSURE: 136 MMHG | HEIGHT: 63 IN | OXYGEN SATURATION: 97 % | DIASTOLIC BLOOD PRESSURE: 64 MMHG | HEART RATE: 75 BPM

## 2022-01-01 VITALS
DIASTOLIC BLOOD PRESSURE: 70 MMHG | TEMPERATURE: 98.5 F | OXYGEN SATURATION: 95 % | SYSTOLIC BLOOD PRESSURE: 132 MMHG | HEART RATE: 76 BPM | RESPIRATION RATE: 28 BRPM

## 2022-01-01 DIAGNOSIS — E03.9 HYPOTHYROIDISM, UNSPECIFIED TYPE: ICD-10-CM

## 2022-01-01 DIAGNOSIS — R05.1 ACUTE COUGH: ICD-10-CM

## 2022-01-01 DIAGNOSIS — N18.30 ANEMIA IN STAGE 3 CHRONIC KIDNEY DISEASE, UNSPECIFIED WHETHER STAGE 3A OR 3B CKD (H): ICD-10-CM

## 2022-01-01 DIAGNOSIS — M48.062 SPINAL STENOSIS OF LUMBAR REGION WITH NEUROGENIC CLAUDICATION: Primary | ICD-10-CM

## 2022-01-01 DIAGNOSIS — M48.00 SPINAL STENOSIS, UNSPECIFIED SPINAL REGION: ICD-10-CM

## 2022-01-01 DIAGNOSIS — B97.89 VIRAL RESPIRATORY INFECTION: ICD-10-CM

## 2022-01-01 DIAGNOSIS — J40 BRONCHITIS: ICD-10-CM

## 2022-01-01 DIAGNOSIS — N18.30 STAGE 3 CHRONIC KIDNEY DISEASE, UNSPECIFIED WHETHER STAGE 3A OR 3B CKD (H): ICD-10-CM

## 2022-01-01 DIAGNOSIS — B97.29 OTHER CORONAVIRUS AS THE CAUSE OF DISEASES CLASSIFIED ELSEWHERE: ICD-10-CM

## 2022-01-01 DIAGNOSIS — R41.89 COGNITIVE DECLINE: ICD-10-CM

## 2022-01-01 DIAGNOSIS — J18.9 COMMUNITY ACQUIRED PNEUMONIA OF LEFT LOWER LOBE OF LUNG: ICD-10-CM

## 2022-01-01 DIAGNOSIS — D63.1 ANEMIA IN STAGE 3 CHRONIC KIDNEY DISEASE, UNSPECIFIED WHETHER STAGE 3A OR 3B CKD (H): ICD-10-CM

## 2022-01-01 DIAGNOSIS — J98.8 VIRAL RESPIRATORY INFECTION: ICD-10-CM

## 2022-01-01 DIAGNOSIS — R73.03 PREDIABETES: ICD-10-CM

## 2022-01-01 DIAGNOSIS — R05.1 ACUTE COUGH: Primary | ICD-10-CM

## 2022-01-01 DIAGNOSIS — I10 BENIGN ESSENTIAL HYPERTENSION: ICD-10-CM

## 2022-01-01 LAB
ALBUMIN SERPL BCG-MCNC: 4.2 G/DL (ref 3.5–5.2)
ALBUMIN UR-MCNC: NEGATIVE MG/DL
ALP SERPL-CCNC: 108 U/L (ref 40–129)
ALT SERPL W P-5'-P-CCNC: 13 U/L (ref 10–50)
ANION GAP SERPL CALCULATED.3IONS-SCNC: 13 MMOL/L (ref 7–15)
APPEARANCE UR: CLEAR
AST SERPL W P-5'-P-CCNC: 22 U/L (ref 10–50)
BASOPHILS # BLD AUTO: 0 10E3/UL (ref 0–0.2)
BASOPHILS # BLD AUTO: 0 10E3/UL (ref 0–0.2)
BASOPHILS NFR BLD AUTO: 0 %
BASOPHILS NFR BLD AUTO: 1 %
BILIRUB SERPL-MCNC: 0.3 MG/DL
BILIRUB UR QL STRIP: NEGATIVE
BUN SERPL-MCNC: 12.2 MG/DL (ref 8–23)
CALCIUM SERPL-MCNC: 9.2 MG/DL (ref 8.8–10.2)
CHLORIDE SERPL-SCNC: 98 MMOL/L (ref 98–107)
COLOR UR AUTO: YELLOW
CREAT SERPL-MCNC: 1.35 MG/DL (ref 0.67–1.17)
DEPRECATED HCO3 PLAS-SCNC: 27 MMOL/L (ref 22–29)
EOSINOPHIL # BLD AUTO: 0.1 10E3/UL (ref 0–0.7)
EOSINOPHIL # BLD AUTO: 0.3 10E3/UL (ref 0–0.7)
EOSINOPHIL NFR BLD AUTO: 1 %
EOSINOPHIL NFR BLD AUTO: 4 %
ERYTHROCYTE [DISTWIDTH] IN BLOOD BY AUTOMATED COUNT: 13.4 % (ref 10–15)
ERYTHROCYTE [DISTWIDTH] IN BLOOD BY AUTOMATED COUNT: 13.6 % (ref 10–15)
ERYTHROCYTE [SEDIMENTATION RATE] IN BLOOD BY WESTERGREN METHOD: 37 MM/HR (ref 0–20)
EST. AVERAGE GLUCOSE BLD GHB EST-MCNC: 143 MG/DL
FLUAV RNA SPEC QL NAA+PROBE: NEGATIVE
FLUBV RNA RESP QL NAA+PROBE: NEGATIVE
GFR SERPL CREATININE-BSD FRML MDRD: 50 ML/MIN/1.73M2
GLUCOSE SERPL-MCNC: 109 MG/DL (ref 70–99)
GLUCOSE UR STRIP-MCNC: NEGATIVE MG/DL
HBA1C MFR BLD: 6.6 %
HCT VFR BLD AUTO: 39.2 % (ref 40–53)
HCT VFR BLD AUTO: 39.7 % (ref 40–53)
HGB BLD-MCNC: 12.9 G/DL (ref 13.3–17.7)
HGB BLD-MCNC: 13.2 G/DL (ref 13.3–17.7)
HGB UR QL STRIP: NEGATIVE
HOLD SPECIMEN: NORMAL
IMM GRANULOCYTES # BLD: 0.1 10E3/UL
IMM GRANULOCYTES NFR BLD: 1 %
KETONES UR STRIP-MCNC: NEGATIVE MG/DL
LEUKOCYTE ESTERASE UR QL STRIP: NEGATIVE
LYMPHOCYTES # BLD AUTO: 1.3 10E3/UL (ref 0.8–5.3)
LYMPHOCYTES # BLD AUTO: 2.2 10E3/UL (ref 0.8–5.3)
LYMPHOCYTES NFR BLD AUTO: 11 %
LYMPHOCYTES NFR BLD AUTO: 26 %
MCH RBC QN AUTO: 31.3 PG (ref 26.5–33)
MCH RBC QN AUTO: 31.9 PG (ref 26.5–33)
MCHC RBC AUTO-ENTMCNC: 32.9 G/DL (ref 31.5–36.5)
MCHC RBC AUTO-ENTMCNC: 33.2 G/DL (ref 31.5–36.5)
MCV RBC AUTO: 95 FL (ref 78–100)
MCV RBC AUTO: 96 FL (ref 78–100)
MONOCYTES # BLD AUTO: 0.7 10E3/UL (ref 0–1.3)
MONOCYTES # BLD AUTO: 0.9 10E3/UL (ref 0–1.3)
MONOCYTES NFR BLD AUTO: 8 %
MONOCYTES NFR BLD AUTO: 9 %
NEUTROPHILS # BLD AUTO: 5 10E3/UL (ref 1.6–8.3)
NEUTROPHILS # BLD AUTO: 9.4 10E3/UL (ref 1.6–8.3)
NEUTROPHILS NFR BLD AUTO: 59 %
NEUTROPHILS NFR BLD AUTO: 80 %
NITRATE UR QL: NEGATIVE
NRBC # BLD AUTO: 0 10E3/UL
NRBC BLD AUTO-RTO: 0 /100
PH UR STRIP: 6 [PH] (ref 5–7)
PLAT MORPH BLD: NORMAL
PLATELET # BLD AUTO: 216 10E3/UL (ref 150–450)
PLATELET # BLD AUTO: 228 10E3/UL (ref 150–450)
POTASSIUM SERPL-SCNC: 4 MMOL/L (ref 3.4–5.3)
PROT SERPL-MCNC: 6.9 G/DL (ref 6.4–8.3)
RBC # BLD AUTO: 4.12 10E6/UL (ref 4.4–5.9)
RBC # BLD AUTO: 4.14 10E6/UL (ref 4.4–5.9)
RBC MORPH BLD: NORMAL
RSV RNA SPEC NAA+PROBE: NEGATIVE
SARS-COV-2 RNA RESP QL NAA+PROBE: NEGATIVE
SODIUM SERPL-SCNC: 138 MMOL/L (ref 136–145)
SP GR UR STRIP: 1.01 (ref 1–1.03)
TSH SERPL DL<=0.005 MIU/L-ACNC: 1.53 UIU/ML (ref 0.3–4.2)
UROBILINOGEN UR STRIP-ACNC: 1 E.U./DL
WBC # BLD AUTO: 11.7 10E3/UL (ref 4–11)
WBC # BLD AUTO: 8.3 10E3/UL (ref 4–11)

## 2022-01-01 PROCEDURE — 71046 X-RAY EXAM CHEST 2 VIEWS: CPT | Mod: TC,FY

## 2022-01-01 PROCEDURE — 99214 OFFICE O/P EST MOD 30 MIN: CPT | Performed by: FAMILY MEDICINE

## 2022-01-01 PROCEDURE — 81003 URINALYSIS AUTO W/O SCOPE: CPT | Mod: ZL | Performed by: NURSE PRACTITIONER

## 2022-01-01 PROCEDURE — 83036 HEMOGLOBIN GLYCOSYLATED A1C: CPT | Mod: ZL | Performed by: FAMILY MEDICINE

## 2022-01-01 PROCEDURE — 85025 COMPLETE CBC W/AUTO DIFF WBC: CPT | Mod: ZL | Performed by: NURSE PRACTITIONER

## 2022-01-01 PROCEDURE — G0463 HOSPITAL OUTPT CLINIC VISIT: HCPCS | Performed by: FAMILY MEDICINE

## 2022-01-01 PROCEDURE — 82374 ASSAY BLOOD CARBON DIOXIDE: CPT | Mod: ZL | Performed by: FAMILY MEDICINE

## 2022-01-01 PROCEDURE — 85652 RBC SED RATE AUTOMATED: CPT | Mod: ZL | Performed by: NURSE PRACTITIONER

## 2022-01-01 PROCEDURE — 85025 COMPLETE CBC W/AUTO DIFF WBC: CPT | Mod: ZL | Performed by: FAMILY MEDICINE

## 2022-01-01 PROCEDURE — 87637 SARSCOV2&INF A&B&RSV AMP PRB: CPT | Mod: ZL | Performed by: NURSE PRACTITIONER

## 2022-01-01 PROCEDURE — 84443 ASSAY THYROID STIM HORMONE: CPT | Mod: ZL | Performed by: FAMILY MEDICINE

## 2022-01-01 PROCEDURE — 99214 OFFICE O/P EST MOD 30 MIN: CPT | Performed by: NURSE PRACTITIONER

## 2022-01-01 PROCEDURE — G0463 HOSPITAL OUTPT CLINIC VISIT: HCPCS | Mod: 25 | Performed by: NURSE PRACTITIONER

## 2022-01-01 PROCEDURE — 36415 COLL VENOUS BLD VENIPUNCTURE: CPT | Mod: ZL | Performed by: NURSE PRACTITIONER

## 2022-01-01 PROCEDURE — 36415 COLL VENOUS BLD VENIPUNCTURE: CPT | Mod: ZL | Performed by: FAMILY MEDICINE

## 2022-01-01 RX ORDER — OXYCODONE HYDROCHLORIDE 10 MG/1
10 TABLET ORAL 2 TIMES DAILY
Qty: 60 TABLET | Refills: 0 | Status: SHIPPED | OUTPATIENT
Start: 2022-01-01 | End: 2022-01-01

## 2022-01-01 RX ORDER — CEFPODOXIME PROXETIL 200 MG/1
200 TABLET, FILM COATED ORAL 2 TIMES DAILY
Qty: 10 TABLET | Refills: 0 | Status: SHIPPED | OUTPATIENT
Start: 2022-01-01 | End: 2022-01-01

## 2022-01-01 RX ORDER — FUROSEMIDE 20 MG
TABLET ORAL
Qty: 90 TABLET | Refills: 1 | Status: SHIPPED | OUTPATIENT
Start: 2022-01-01 | End: 2023-01-01

## 2022-01-01 RX ORDER — METHADONE HYDROCHLORIDE 5 MG/1
2.5 TABLET ORAL EVERY 12 HOURS
Qty: 30 TABLET | Refills: 0 | Status: SHIPPED | OUTPATIENT
Start: 2022-01-01 | End: 2022-01-01

## 2022-01-01 RX ORDER — PREDNISONE 20 MG/1
20 TABLET ORAL DAILY
Qty: 5 TABLET | Refills: 0 | Status: SHIPPED | OUTPATIENT
Start: 2022-01-01 | End: 2022-01-01

## 2022-01-01 RX ORDER — LEVOTHYROXINE SODIUM 75 UG/1
TABLET ORAL
Qty: 60 TABLET | Refills: 0 | Status: SHIPPED | OUTPATIENT
Start: 2022-01-01 | End: 2023-01-01

## 2022-01-01 RX ORDER — AZITHROMYCIN 250 MG/1
TABLET, FILM COATED ORAL
Qty: 6 TABLET | Refills: 0 | Status: SHIPPED | OUTPATIENT
Start: 2022-01-01 | End: 2022-01-01

## 2022-01-01 RX ORDER — GABAPENTIN 300 MG/1
CAPSULE ORAL
Qty: 630 CAPSULE | Refills: 4 | Status: SHIPPED | OUTPATIENT
Start: 2022-01-01 | End: 2023-01-01

## 2022-01-01 RX ORDER — OXYCODONE HYDROCHLORIDE 10 MG/1
10 TABLET ORAL 2 TIMES DAILY
Qty: 60 TABLET | Refills: 0 | Status: SHIPPED | OUTPATIENT
Start: 2022-01-01 | End: 2023-01-01

## 2022-01-01 RX ORDER — LEVOTHYROXINE SODIUM 75 UG/1
TABLET ORAL
Qty: 60 TABLET | Refills: 0 | Status: SHIPPED | OUTPATIENT
Start: 2022-01-01 | End: 2022-01-01

## 2022-01-01 ASSESSMENT — ANXIETY QUESTIONNAIRES
GAD7 TOTAL SCORE: 0
2. NOT BEING ABLE TO STOP OR CONTROL WORRYING: NOT AT ALL
4. TROUBLE RELAXING: NOT AT ALL
5. BEING SO RESTLESS THAT IT IS HARD TO SIT STILL: NOT AT ALL
3. WORRYING TOO MUCH ABOUT DIFFERENT THINGS: NOT AT ALL
GAD7 TOTAL SCORE: 0
1. FEELING NERVOUS, ANXIOUS, OR ON EDGE: NOT AT ALL
7. FEELING AFRAID AS IF SOMETHING AWFUL MIGHT HAPPEN: NOT AT ALL
6. BECOMING EASILY ANNOYED OR IRRITABLE: NOT AT ALL

## 2022-01-01 ASSESSMENT — PAIN SCALES - GENERAL: PAINLEVEL: NO PAIN (0)

## 2022-01-01 ASSESSMENT — PATIENT HEALTH QUESTIONNAIRE - PHQ9: SUM OF ALL RESPONSES TO PHQ QUESTIONS 1-9: 0

## 2022-02-09 DIAGNOSIS — M48.00 SPINAL STENOSIS, UNSPECIFIED SPINAL REGION: ICD-10-CM

## 2022-02-11 RX ORDER — OXYCODONE HYDROCHLORIDE 10 MG/1
10 TABLET ORAL 3 TIMES DAILY
Qty: 90 TABLET | Refills: 0 | Status: SHIPPED | OUTPATIENT
Start: 2022-02-11 | End: 2022-03-11

## 2022-02-11 RX ORDER — METHADONE HYDROCHLORIDE 5 MG/1
TABLET ORAL
Qty: 75 TABLET | Refills: 0 | Status: SHIPPED | OUTPATIENT
Start: 2022-02-11 | End: 2022-03-11

## 2022-02-11 NOTE — TELEPHONE ENCOUNTER
methadone (DOLOPHINE) 5 MG tablet  Last Written Prescription Date:  1/11/22  Last Fill Quantity: 75,  # refills: 0     oxyCODONE IR (ROXICODONE) 10 MG tablet  Last Written Prescription Date:  1/11/22  Last Fill Quantity: 90,  # refills: 0     Last office visit: 11/15/21  Future Office Visit:    Appointments in Next Year    Apr 01, 2022  4:00 PM  (Arrive by 3:45 PM)  New Patient with Shabnam Burgos MD  [facility] ([facility] ) [phone]        Routing refill request to provider for review/approval because:  Drug not on the [facility] refill protocol

## 2022-02-22 DIAGNOSIS — E03.9 HYPOTHYROIDISM, UNSPECIFIED TYPE: ICD-10-CM

## 2022-02-22 RX ORDER — LEVOTHYROXINE SODIUM 75 UG/1
TABLET ORAL
Qty: 90 TABLET | Refills: 2 | OUTPATIENT
Start: 2022-02-22

## 2022-03-11 DIAGNOSIS — R60.0 PERIPHERAL EDEMA: ICD-10-CM

## 2022-03-11 DIAGNOSIS — M48.00 SPINAL STENOSIS, UNSPECIFIED SPINAL REGION: ICD-10-CM

## 2022-03-11 DIAGNOSIS — I10 BENIGN ESSENTIAL HYPERTENSION: ICD-10-CM

## 2022-03-11 RX ORDER — METHADONE HYDROCHLORIDE 5 MG/1
TABLET ORAL
Qty: 75 TABLET | Refills: 0 | Status: SHIPPED | OUTPATIENT
Start: 2022-03-11 | End: 2022-04-13

## 2022-03-11 RX ORDER — FUROSEMIDE 20 MG
TABLET ORAL
Qty: 90 TABLET | Refills: 1 | OUTPATIENT
Start: 2022-03-11

## 2022-03-11 RX ORDER — AMLODIPINE BESYLATE 5 MG/1
TABLET ORAL
Qty: 90 TABLET | Refills: 3 | OUTPATIENT
Start: 2022-03-11

## 2022-03-11 RX ORDER — OXYCODONE HYDROCHLORIDE 10 MG/1
10 TABLET ORAL 3 TIMES DAILY
Qty: 90 TABLET | Refills: 0 | Status: SHIPPED | OUTPATIENT
Start: 2022-03-11 | End: 2022-04-13

## 2022-03-11 NOTE — TELEPHONE ENCOUNTER
oxyCODONE IR (ROXICODONE) 10 MG tablet      Last Written Prescription Date:  2/11/22  Last Fill Quantity: 90,   # refills: 0  Last Office Visit: 11/5/21  Future Office visit:       Routing refill request to provider for review/approval because:  Drug not on the [facility], [facility] or [facility] refill protocol or controlled substance        methadone (DOLOPHINE) 5 MG tablet      Last Written Prescription Date:  2/11/22  Last Fill Quantity: 75,   # refills: 0  Last Office Visit: 11/5/21  Future Office visit:       Routing refill request to provider for review/approval because:  Drug not on the [facility], [facility] or [facility] refill protocol or controlled substance

## 2022-03-11 NOTE — TELEPHONE ENCOUNTER
BP Readings from Last 3 Encounters:   11/05/21 100/60   07/30/21 127/58   04/30/21 124/68

## 2022-03-31 DIAGNOSIS — F11.90 CHRONIC, CONTINUOUS USE OF OPIOIDS: Primary | ICD-10-CM

## 2022-03-31 DIAGNOSIS — Z51.81 ENCOUNTER FOR THERAPEUTIC DRUG LEVEL MONITORING: ICD-10-CM

## 2022-04-01 ENCOUNTER — OFFICE VISIT (OUTPATIENT)
Dept: FAMILY MEDICINE | Facility: OTHER | Age: 87
End: 2022-04-01
Attending: FAMILY MEDICINE
Payer: MEDICARE

## 2022-04-01 VITALS
DIASTOLIC BLOOD PRESSURE: 75 MMHG | SYSTOLIC BLOOD PRESSURE: 163 MMHG | OXYGEN SATURATION: 95 % | WEIGHT: 165 LBS | HEIGHT: 63 IN | HEART RATE: 70 BPM | BODY MASS INDEX: 29.23 KG/M2 | TEMPERATURE: 97.9 F

## 2022-04-01 DIAGNOSIS — I10 BENIGN ESSENTIAL HYPERTENSION: ICD-10-CM

## 2022-04-01 DIAGNOSIS — G47.10 HYPERSOMNIA: ICD-10-CM

## 2022-04-01 DIAGNOSIS — Z51.81 ENCOUNTER FOR THERAPEUTIC DRUG MONITORING: ICD-10-CM

## 2022-04-01 DIAGNOSIS — M62.81 GENERALIZED MUSCLE WEAKNESS: ICD-10-CM

## 2022-04-01 DIAGNOSIS — G25.81 RESTLESS LEGS SYNDROME (RLS): ICD-10-CM

## 2022-04-01 DIAGNOSIS — R29.6 RECURRENT FALLS: ICD-10-CM

## 2022-04-01 DIAGNOSIS — E53.8 VITAMIN B12 DEFICIENCY (NON ANEMIC): ICD-10-CM

## 2022-04-01 DIAGNOSIS — E55.9 VITAMIN D DEFICIENCY: ICD-10-CM

## 2022-04-01 DIAGNOSIS — N18.30 STAGE 3 CHRONIC KIDNEY DISEASE, UNSPECIFIED WHETHER STAGE 3A OR 3B CKD (H): ICD-10-CM

## 2022-04-01 DIAGNOSIS — F42.9 OBSESSIVE-COMPULSIVE DISORDER, UNSPECIFIED TYPE: ICD-10-CM

## 2022-04-01 DIAGNOSIS — F11.90 CHRONIC, CONTINUOUS USE OF OPIOIDS: ICD-10-CM

## 2022-04-01 DIAGNOSIS — E03.4 HYPOTHYROIDISM DUE TO ACQUIRED ATROPHY OF THYROID: ICD-10-CM

## 2022-04-01 DIAGNOSIS — R41.89 COGNITIVE IMPAIRMENT: Primary | ICD-10-CM

## 2022-04-01 DIAGNOSIS — Z12.5 SPECIAL SCREENING FOR MALIGNANT NEOPLASM OF PROSTATE: ICD-10-CM

## 2022-04-01 DIAGNOSIS — E11.9 TYPE 2 DIABETES MELLITUS WITHOUT COMPLICATION, WITHOUT LONG-TERM CURRENT USE OF INSULIN (H): ICD-10-CM

## 2022-04-01 DIAGNOSIS — R26.9 ABNORMAL GAIT: ICD-10-CM

## 2022-04-01 DIAGNOSIS — Z51.81 ENCOUNTER FOR THERAPEUTIC DRUG LEVEL MONITORING: ICD-10-CM

## 2022-04-01 DIAGNOSIS — I35.0 AORTIC VALVE STENOSIS, ETIOLOGY OF CARDIAC VALVE DISEASE UNSPECIFIED: ICD-10-CM

## 2022-04-01 DIAGNOSIS — M48.00 SPINAL STENOSIS, UNSPECIFIED SPINAL REGION: ICD-10-CM

## 2022-04-01 DIAGNOSIS — H35.30 MACULAR DEGENERATION (SENILE) OF RETINA: ICD-10-CM

## 2022-04-01 DIAGNOSIS — K21.9 GASTROESOPHAGEAL REFLUX DISEASE WITHOUT ESOPHAGITIS: ICD-10-CM

## 2022-04-01 LAB
ALBUMIN SERPL-MCNC: 3.6 G/DL (ref 3.4–5)
ALBUMIN UR-MCNC: 10 MG/DL
ALP SERPL-CCNC: 105 U/L (ref 40–150)
ALT SERPL W P-5'-P-CCNC: 17 U/L (ref 0–70)
AMPHETAMINES UR QL: NOT DETECTED
ANION GAP SERPL CALCULATED.3IONS-SCNC: 4 MMOL/L (ref 3–14)
APPEARANCE UR: CLEAR
AST SERPL W P-5'-P-CCNC: 20 U/L (ref 0–45)
BARBITURATES UR QL SCN: NOT DETECTED
BENZODIAZ UR QL SCN: NOT DETECTED
BILIRUB DIRECT SERPL-MCNC: <0.1 MG/DL (ref 0–0.2)
BILIRUB SERPL-MCNC: 0.3 MG/DL (ref 0.2–1.3)
BILIRUB UR QL STRIP: NEGATIVE
BUN SERPL-MCNC: 20 MG/DL (ref 7–30)
BUPRENORPHINE UR QL: NOT DETECTED
CALCIUM SERPL-MCNC: 8.8 MG/DL (ref 8.5–10.1)
CANNABINOIDS UR QL: NOT DETECTED
CHLORIDE BLD-SCNC: 102 MMOL/L (ref 94–109)
CO2 SERPL-SCNC: 30 MMOL/L (ref 20–32)
COCAINE UR QL SCN: NOT DETECTED
COLOR UR AUTO: ABNORMAL
CREAT SERPL-MCNC: 1.16 MG/DL (ref 0.66–1.25)
CREAT UR-MCNC: 84 MG/DL
CREAT UR-MCNC: 86 MG/DL
D-METHAMPHET UR QL: NOT DETECTED
EST. AVERAGE GLUCOSE BLD GHB EST-MCNC: 128 MG/DL
GFR SERPL CREATININE-BSD FRML MDRD: 60 ML/MIN/1.73M2
GLUCOSE BLD-MCNC: 107 MG/DL (ref 70–99)
GLUCOSE UR STRIP-MCNC: NEGATIVE MG/DL
HBA1C MFR BLD: 6.1 % (ref 0–5.6)
HGB UR QL STRIP: NEGATIVE
KETONES UR STRIP-MCNC: NEGATIVE MG/DL
LEUKOCYTE ESTERASE UR QL STRIP: NEGATIVE
METHADONE UR QL SCN: DETECTED
MICROALBUMIN UR-MCNC: 41 MG/L
MICROALBUMIN/CREAT UR: 48.81 MG/G CR (ref 0–17)
MUCOUS THREADS #/AREA URNS LPF: PRESENT /LPF
NITRATE UR QL: NEGATIVE
OPIATES UR QL SCN: NOT DETECTED
OXYCODONE UR QL SCN: DETECTED
PCP UR QL SCN: NOT DETECTED
PH UR STRIP: 5.5 [PH] (ref 4.7–8)
POTASSIUM BLD-SCNC: 3.6 MMOL/L (ref 3.4–5.3)
PROPOXYPH UR QL: NOT DETECTED
PROT SERPL-MCNC: 7 G/DL (ref 6.8–8.8)
RBC URINE: 1 /HPF
SODIUM SERPL-SCNC: 136 MMOL/L (ref 133–144)
SP GR UR STRIP: 1.02 (ref 1–1.03)
SQUAMOUS EPITHELIAL: 0 /HPF
TRICYCLICS UR QL SCN: NOT DETECTED
UROBILINOGEN UR STRIP-MCNC: NORMAL MG/DL
WBC URINE: 1 /HPF

## 2022-04-01 PROCEDURE — 83036 HEMOGLOBIN GLYCOSYLATED A1C: CPT | Mod: ZL | Performed by: FAMILY MEDICINE

## 2022-04-01 PROCEDURE — 82043 UR ALBUMIN QUANTITATIVE: CPT | Mod: ZL | Performed by: FAMILY MEDICINE

## 2022-04-01 PROCEDURE — 82607 VITAMIN B-12: CPT | Mod: ZL | Performed by: FAMILY MEDICINE

## 2022-04-01 PROCEDURE — 80306 DRUG TEST PRSMV INSTRMNT: CPT | Mod: ZL | Performed by: FAMILY MEDICINE

## 2022-04-01 PROCEDURE — 36415 COLL VENOUS BLD VENIPUNCTURE: CPT | Mod: ZL | Performed by: FAMILY MEDICINE

## 2022-04-01 PROCEDURE — 80053 COMPREHEN METABOLIC PANEL: CPT | Mod: ZL | Performed by: FAMILY MEDICINE

## 2022-04-01 PROCEDURE — 80307 DRUG TEST PRSMV CHEM ANLYZR: CPT | Mod: ZL | Performed by: FAMILY MEDICINE

## 2022-04-01 PROCEDURE — 93005 ELECTROCARDIOGRAM TRACING: CPT | Performed by: FAMILY MEDICINE

## 2022-04-01 PROCEDURE — G0103 PSA SCREENING: HCPCS | Mod: ZL | Performed by: FAMILY MEDICINE

## 2022-04-01 PROCEDURE — 99215 OFFICE O/P EST HI 40 MIN: CPT | Mod: 25 | Performed by: FAMILY MEDICINE

## 2022-04-01 PROCEDURE — 82306 VITAMIN D 25 HYDROXY: CPT | Mod: ZL | Performed by: FAMILY MEDICINE

## 2022-04-01 PROCEDURE — 93010 ELECTROCARDIOGRAM REPORT: CPT | Mod: 77 | Performed by: INTERNAL MEDICINE

## 2022-04-01 PROCEDURE — 82248 BILIRUBIN DIRECT: CPT | Mod: ZL | Performed by: FAMILY MEDICINE

## 2022-04-01 PROCEDURE — G0463 HOSPITAL OUTPT CLINIC VISIT: HCPCS

## 2022-04-01 PROCEDURE — 81001 URINALYSIS AUTO W/SCOPE: CPT | Mod: ZL | Performed by: FAMILY MEDICINE

## 2022-04-01 RX ORDER — AMMONIUM LACTATE 12 G/100G
CREAM TOPICAL
COMMUNITY
Start: 2021-11-08 | End: 2023-01-01

## 2022-04-01 ASSESSMENT — ANXIETY QUESTIONNAIRES
GAD7 TOTAL SCORE: 0
3. WORRYING TOO MUCH ABOUT DIFFERENT THINGS: NOT AT ALL
2. NOT BEING ABLE TO STOP OR CONTROL WORRYING: NOT AT ALL
1. FEELING NERVOUS, ANXIOUS, OR ON EDGE: NOT AT ALL
5. BEING SO RESTLESS THAT IT IS HARD TO SIT STILL: NOT AT ALL
4. TROUBLE RELAXING: NOT AT ALL
6. BECOMING EASILY ANNOYED OR IRRITABLE: NOT AT ALL
7. FEELING AFRAID AS IF SOMETHING AWFUL MIGHT HAPPEN: NOT AT ALL

## 2022-04-01 ASSESSMENT — PAIN SCALES - GENERAL: PAINLEVEL: NO PAIN (0)

## 2022-04-01 ASSESSMENT — PATIENT HEALTH QUESTIONNAIRE - PHQ9: SUM OF ALL RESPONSES TO PHQ QUESTIONS 1-9: 9

## 2022-04-01 NOTE — LETTER
April 4, 2022      [place]  [address] [place] [facility] [address]        Dear ,    We are writing to inform you of your test results.    Your test results fall within the expected range(s) or remain unchanged from previous results.  Please continue with current treatment plan.    Resulted Orders   Urine Drugs of Abuse Screen (Tox13)   Result Value Ref Range    Cannabinoids (52-esk-6-carboxy-9-THC) Not Detected Not Detected, Indeterminate      Comment:      Cutoff for a negative cannabinoid is 50 ng/mL or less.    Phencyclidine Not Detected Not Detected, Indeterminate      Comment:      Cutoff for a negative PCP is 25 ng/mL or less.    Cocaine (Benzoylecgonine) Not Detected Not Detected, Indeterminate      Comment:      Cutoff for a negative cocaine is 150 ng/ml or less.    Methamphetamine (d-Methamphetamine) Not Detected Not Detected, Indeterminate      Comment:      Cutoff for a negative methamphetamine is 500 ng/ml or less.    Opiates (Morphine) Not Detected Not Detected, Indeterminate      Comment:      Cutoff for a negative opiate is 100 ng/ml or less.    Amphetamine (d-Amphetamine) Not Detected Not Detected, Indeterminate      Comment:      Cutoff for a negative amphetamine is 500 ng/mL or less.    Benzodiazepines (Nordiazepam) Not Detected Not Detected, Indeterminate      Comment:      Cutoff for a negative benzodiazepine is 150 ng/ml or less.    Tricyclic Antidepressants (Desipramine) Not Detected Not Detected, Indeterminate      Comment:      Cutoff for a negative tricyclic antidepressant is 300 ng/ml or less.    Methadone Detected (A) Not Detected, Indeterminate      Comment:      Cutoff for a positive barbiturate is greater than 200 ng/ml.  This is an unconfirmed screening result to be used for medical purposes only.     Barbiturates (Butalbital) Not Detected Not Detected, Indeterminate      Comment:      Cutoff for a negative barbituate is 200 ng/ml or less.    Oxycodone Detected (A) Not  Detected, Indeterminate      Comment:      Cutoff for a positive oxycodone is greater than 100 ng/ml.  This is an unconfirmed screening result to be used for medical purposes only.     Propoxyphene (Norpropoxyphene) Not Detected Not Detected, Indeterminate      Comment:      Cutoff for a negative propoxyphene is 300 ng/ml or less.    Buprenorphine Not Detected Not Detected, Indeterminate      Comment:      Cutoff for a negative buprenorphine is 10 ng/ml or less.   Hemoglobin A1c   Result Value Ref Range    Estimated Average Glucose 128 mg/dL    Hemoglobin A1C 6.1 (H) 0.0 - 5.6 %      Comment:      Normal <5.7%   Prediabetes 5.7-6.4%    Diabetes 6.5% or higher     Note: Adopted from ADA consensus guidelines.   Albumin Random Urine Quantitative with Creat Ratio   Result Value Ref Range    Creatinine Urine mg/dL 84 mg/dL    Albumin Urine mg/L 41 mg/L    Albumin Urine mg/g Cr 48.81 (H) 0.00 - 17.00 mg/g Cr   Basic metabolic panel   Result Value Ref Range    Sodium 136 133 - 144 mmol/L    Potassium 3.6 3.4 - 5.3 mmol/L    Chloride 102 94 - 109 mmol/L    Carbon Dioxide (CO2) 30 20 - 32 mmol/L    Anion Gap 4 3 - 14 mmol/L    Urea Nitrogen 20 7 - 30 mg/dL    Creatinine 1.16 0.66 - 1.25 mg/dL    Calcium 8.8 8.5 - 10.1 mg/dL    Glucose 107 (H) 70 - 99 mg/dL    GFR Estimate 60 (L) >60 mL/min/1.73m2      Comment:      Effective December 21, 2021 eGFRcr in adults is calculated using the 2021 CKD-EPI creatinine equation which includes age and gender (Billy et al., NEJM, DOI: 10.1056/FZNQgk0967542)   Hepatic panel (Albumin, ALT, AST, Bili, Alk Phos, TP)   Result Value Ref Range    Bilirubin Total 0.3 0.2 - 1.3 mg/dL    Bilirubin Direct <0.1 0.0 - 0.2 mg/dL    Protein Total 7.0 6.8 - 8.8 g/dL    Albumin 3.6 3.4 - 5.0 g/dL    Alkaline Phosphatase 105 40 - 150 U/L    AST 20 0 - 45 U/L    ALT 17 0 - 70 U/L   UA reflex to Microscopic and Culture - [place]   Result Value Ref Range    Color Urine Light Yellow Colorless, Straw, Light  Yellow, Yellow    Appearance Urine Clear Clear    Glucose Urine Negative Negative mg/dL    Bilirubin Urine Negative Negative    Ketones Urine Negative Negative mg/dL    Specific Gravity Urine 1.017 1.003 - 1.035    Blood Urine Negative Negative    pH Urine 5.5 4.7 - 8.0    Protein Albumin Urine 10  (A) Negative mg/dL    Urobilinogen Urine Normal Normal, 2.0 mg/dL    Nitrite Urine Negative Negative    Leukocyte Esterase Urine Negative Negative    Mucus Urine Present (A) None Seen /LPF    RBC Urine 1 <=2 /HPF    WBC Urine 1 <=5 /HPF    Squamous Epithelials Urine 0 <=1 /HPF    Narrative    Urine Culture not indicated   Urine Creatinine for Drug Screen Panel   Result Value Ref Range    Creatinine Urine for Drug Screen 86 mg/dL      Comment:      The reference range has not been established for creatinine in random urines. The results should be integrated into the clinical context for interpretation.       If you have any questions or concerns, please call the clinic at the number listed above.       Sincerely,      Shabnam Burgos MD

## 2022-04-01 NOTE — NURSING NOTE
"Chief Complaint   Patient presents with     Establish Care       Initial /72 (BP Location: Left arm, Patient Position: Sitting, Cuff Size: Adult Regular)   Pulse 70   Temp 97.9  F (36.6  C) (Tympanic)   Ht 1.588 m (5' 2.5\")   Wt 74.8 kg (165 lb)   SpO2 95%   BMI 29.70 kg/m   Estimated body mass index is 29.7 kg/m  as calculated from the following:    Height as of this encounter: 1.588 m (5' 2.5\").    Weight as of this encounter: 74.8 kg (165 lb).  Medication Reconciliation: complete  Angeline Price LPN  "

## 2022-04-01 NOTE — PROGRESS NOTES
"  Assessment & Plan     Type 2 diabetes mellitus without complication, without long-term current use of insulin (H)  Diet controlled with A1C of 6. Monitor.   - Hemoglobin A1c  - Albumin Random Urine Quantitative with Creat Ratio    Stage 3 chronic kidney disease, unspecified whether stage 3a or 3b CKD (H)  Baseline creat ranges from 1.1-1.3 no significant change, monitor.   - Basic metabolic panel    Chronic, continuous use of opioids / Spinal Stenosis  Previously treated at [facility], [facility]. Somehow got on regimen of methadone, oxycodone and gabapentin. He clearly has LLE weakness, endorses no pain. Discussed with daughter that may need dose reduction in medications given age related changes in bbb, hepatic and renal metabolism. Will consult with pharmacy to discuss further. NOte no imaging done in \"years\"  - Drug Confirmation Panel Urine with Creat  - Urine Drugs of Abuse Screen (Tox13)    Gastroesophageal reflux disease without esophagitis  Gi consultation 3/2019 with gastritis, presumed to be related to nsaid use. Started on ppi at that time. Declined EGD due to symptomatic improvement. Continues on high dose ppi, unclear if still using nsaid.     Hypothyroidism due to acquired atrophy of thyroid  TSH 2.58 4/2021    Benign essential hypertension  Controlled, Hypertensive response to BP standing, no ortho stasis.     Restless Legs Syndrome  On gabapentin for pain, also seems to help this issue which has been quiescent.     Obsessive-compulsive disorder, unspecified type  Started on remeron for \"adjustment disorder\" in 2018 per records. Weight decreasing slowly. Daughter alludes to the fact that the OCD diagnosis is likely correct, but no further specifics notes.     Macular degeneration (senile) of retina  Noted, no longer driving.     Aortic valve stenosis, etiology of cardiac valve disease unspecified  Per [facility], moderate with normal EF in 2020. Loud murmur, no orthostasis " today in exam room, but certainly may contribute.     Cognitive impairment  Declined over the last 6 mo, update B12 as I do not think he has been getting injections. Consider MRI, probably   - Vitamin B12    Recurrent falls / Abnormal gait / Weight loss / Generalized muscle weakness / Hypersomnia  Suspect multifactorial related to cog decline, medication effect and known spinal stenosis. Labs today, consider PT is normal and discuss medication taper. Will define cognition a bit more as well, consider MRI of brain as well as spine.   Hemicolectomy in 1970, unclear history.   - UA reflex to Microscopic and Culture - [facility]  - Vitamin D Deficiency    Encounter for therapeutic drug monitoring  Needs updated EKG and drug screen, obtained today.   - Urine Drugs of Abuse Screen (Tox13)  - Hepatic panel (Albumin, ALT, AST, Bili, Alk Phos, TP)  - EKG 12-lead complete w/read - (Clinic Performed)  - Urine Drugs of Abuse Screen (Tox13)  - Hepatic panel (Albumin, ALT, AST, Bili, Alk Phos, TP)    Vitamin B12 deficiency (non anemic)  Looks like he was getting injections in the past, will recheck in 3 mo to see if absorbing  - vitamin B-12 (CYANOCOBALAMIN) 1000 MCG CR tablet  Dispense: 90 tablet; Refill: 3    Right ceruminosis  Clear via lavage.     75 minutes spent on the date of the encounter doing chart review, review of outside records, review of test results, interpretation of tests, patient visit and documentation     No follow-ups on file.    Shabnam Burgos MD  [facility] - [facility] Stevenson is a 89 year old who presents for the following health issues  accompanied by his daughter.    HPI     Diabetes Follow-up      How often are you checking your blood sugar? Not at all    What concerns do you have today about your diabetes? None     Do you have any of these symptoms? (Select all that apply)  No numbness or tingling in feet.  No redness, sores or blisters on feet.  No complaints of excessive  thirst.  No reports of blurry vision.  No significant changes to weight.    Have you had a diabetic eye exam in the last 12 months? Yes- Date of last eye exam: 4/28/2022,  [facility]: [facility]    BP Readings from Last 2 Encounters:   04/01/22 (!) 163/75   11/05/21 100/60     Hemoglobin A1C POCT (%)   Date Value   10/27/2020 6.4 (H)   11/14/2017 6.0     Hemoglobin A1C (%)   Date Value   04/01/2022 6.1 (H)   07/30/2021 6.1 (H)     LDL Cholesterol Calculated (mg/dL)   Date Value   07/30/2021 127 (H)   11/14/2017 107 (H)   09/20/2016 84     Hypertension Follow-up      Do you check your blood pressure regularly outside of the clinic? No     Are you following a low salt diet? No    Are your blood pressures ever more than 140 on the top number (systolic) OR more   than 90 on the bottom number (diastolic), for example 140/90? No    Hypothyroidism Follow-up      Since last visit, patient describes the following symptoms: Weight stable, no hair loss, no skin changes, no constipation, no loose stools      Pain History:  When did you first notice your pain? - More than 6 weeks   Have you seen this provider for your pain in the past?   No   Where in your body do your have pain? Back, legs  Are you seeing anyone else for your pain? Yes - previous seen at pain clinic  What makes your pain better? na  What makes your pain worse? na  How has pain affected your ability to work? na  Who lives in your household? Self, cat    PHQ-9 SCORE 11/14/2017 1/29/2021 4/1/2022   PHQ-9 Total Score 0 2 9       THOMAS-7 SCORE 11/14/2017 1/29/2021 4/1/2022   Total Score 0 0 0     Chronic Pain - Initial Assessment:    How would you describe your pain? Denies pain  Have you had any recent changes to the severity or character of your pain? none  Is there an underlying cause that has been identified? Spinal stenosis  Has your ability to work or do daily activities changed recently because of your pain? no  Which of these pain treatments have you  "tried? Pain Clinic  Previous medication treatments:  Opiates - methadone and morphine/oxycodone/tramadol (MSContin, Monica, Avinza, Oromorph, Percocet, Oxycontin, Ultram)  NSAIDs - ibuprofen (Motrin)  Antiepileptics - gabapentin (Neurontin)     SOCIAL HISTORY:  Living Situation: patient lives alone with his cat  Supports/Family: daughter lives nearby. He as 3 daughters, Seda is local and with him today  Smoking: no  Drinking/Drugs: no known history of heavy use    FUNCTIONAL HISTORY:  ADLs:  IADLS: does not drive, shopping, cooking, medications managed by daughter.   Safety/Falls: Rexurrent falls, increased over the last year. Using a walker now, previously just a cane. Last fall abut 2 weeks ago. Falling at least once amonth, maybe more. May not be reporting to daughter. Has life alert and does use. Dizzy upon standing.   Memory: Worsening over the last 6 month sper daughter. ADL independent, no recent changes in health or meds. Also note increase fatigue. Falling asleep whenever seated. Sleeps in recliner. Forgets to eat unless food placed out for him.     Baseline weight 170 2020 and now 165. On remeron for mood per records since 2018.     COGNITIVE STATUS  Testing:  Cognitive domain impairment:      GERIATRIC ROS:  Continence: incontinence worsening. Continent of bowel - no constipation  Nutrition: appetite poor, weight slowly decreasing.   Sleep:  Pain:  Vision/Hearing:  Mood:     Review of Systems   Constitutional, HEENT, cardiovascular, pulmonary, GI, , musculoskeletal, neuro, skin, endocrine and psych systems are negative, except as otherwise noted.      Objective    BP (!) 163/75 (BP Location: Left arm, Patient Position: Standing)   Pulse 70   Temp 97.9  F (36.6  C) (Tympanic)   Ht 1.588 m (5' 2.5\")   Wt 74.8 kg (165 lb)   SpO2 95%   BMI 29.70 kg/m    Body mass index is 29.7 kg/m .  Physical Exam   GENERAL: alert and no distress  EYES: Eyes grossly normal to inspection, PERRL and conjunctivae and " sclerae normal  HENT: normal cephalic/atraumatic, ear canals and Right TM occluded by wax, Left TM wnl nose and mouth without ulcers or lesions and oral mucous membranes moist  NECK: no adenopathy, no asymmetry, masses, or scars and thyroid normal to palpation  RESP: lungs clear to auscultation - no rales, rhonchi or wheezes  CV: regular rates and rhythm, normal S1 S2, no S3 or S4, no murmur, click or rub and no peripheral edema  ABDOMEN: soft, nontender, no hepatosplenomegaly, no masses and bowel sounds normal  MS: There is 4/6 strength in the LLE 5/5 in the RLE. Difficulty rising from chair with both arms.   SKIN: no suspicious lesions or rashes  NEURO: mentation intact, speech normal, cranial nerves 2-12 grossly intact and gait abnormal: slow, unsteady especially with turning  PSYCH: mentation appears normal, affect normal/bright    Results for orders placed or performed in visit on 04/01/22   Urine Drugs of Abuse Screen (Tox13)     Status: Abnormal   Result Value Ref Range    Cannabinoids (57-vqr-8-carboxy-9-THC) Not Detected Not Detected, Indeterminate    Phencyclidine Not Detected Not Detected, Indeterminate    Cocaine (Benzoylecgonine) Not Detected Not Detected, Indeterminate    Methamphetamine (d-Methamphetamine) Not Detected Not Detected, Indeterminate    Opiates (Morphine) Not Detected Not Detected, Indeterminate    Amphetamine (d-Amphetamine) Not Detected Not Detected, Indeterminate    Benzodiazepines (Nordiazepam) Not Detected Not Detected, Indeterminate    Tricyclic Antidepressants (Desipramine) Not Detected Not Detected, Indeterminate    Methadone Detected (A) Not Detected, Indeterminate    Barbiturates (Butalbital) Not Detected Not Detected, Indeterminate    Oxycodone Detected (A) Not Detected, Indeterminate    Propoxyphene (Norpropoxyphene) Not Detected Not Detected, Indeterminate    Buprenorphine Not Detected Not Detected, Indeterminate   Hemoglobin A1c     Status: Abnormal   Result Value Ref Range     Estimated Average Glucose 128 mg/dL    Hemoglobin A1C 6.1 (H) 0.0 - 5.6 %   Albumin Random Urine Quantitative with Creat Ratio     Status: Abnormal   Result Value Ref Range    Creatinine Urine mg/dL 84 mg/dL    Albumin Urine mg/L 41 mg/L    Albumin Urine mg/g Cr 48.81 (H) 0.00 - 17.00 mg/g Cr   Basic metabolic panel     Status: Abnormal   Result Value Ref Range    Sodium 136 133 - 144 mmol/L    Potassium 3.6 3.4 - 5.3 mmol/L    Chloride 102 94 - 109 mmol/L    Carbon Dioxide (CO2) 30 20 - 32 mmol/L    Anion Gap 4 3 - 14 mmol/L    Urea Nitrogen 20 7 - 30 mg/dL    Creatinine 1.16 0.66 - 1.25 mg/dL    Calcium 8.8 8.5 - 10.1 mg/dL    Glucose 107 (H) 70 - 99 mg/dL    GFR Estimate 60 (L) >60 mL/min/1.73m2   Vitamin B12     Status: Normal   Result Value Ref Range    Vitamin B12 295 193 - 986 pg/mL   Vitamin D Deficiency     Status: Normal   Result Value Ref Range    Vitamin D, Total (25-Hydroxy) 45 20 - 75 ug/L    Narrative    Season, race, dietary intake, and treatment affect the concentration of 25-hydroxy-Vitamin D. Values may decrease during winter months and increase during summer months. Values 20-29 ug/L may indicate Vitamin D insufficiency and values <20 ug/L may indicate Vitamin D deficiency.    Vitamin D determination is routinely performed by an immunoassay specific for 25 hydroxyvitamin D3.  If an individual is on vitamin D2(ergocalciferol) supplementation, please specify 25 OH vitamin D2 and D3 level determination by LCMSMS test VITD23.     Hepatic panel (Albumin, ALT, AST, Bili, Alk Phos, TP)     Status: Normal   Result Value Ref Range    Bilirubin Total 0.3 0.2 - 1.3 mg/dL    Bilirubin Direct <0.1 0.0 - 0.2 mg/dL    Protein Total 7.0 6.8 - 8.8 g/dL    Albumin 3.6 3.4 - 5.0 g/dL    Alkaline Phosphatase 105 40 - 150 U/L    AST 20 0 - 45 U/L    ALT 17 0 - 70 U/L   UA reflex to Microscopic and Culture - [place]     Status: Abnormal    Specimen: Urine, Clean Catch   Result Value Ref Range    Color Urine Light  Yellow Colorless, Straw, Light Yellow, Yellow    Appearance Urine Clear Clear    Glucose Urine Negative Negative mg/dL    Bilirubin Urine Negative Negative    Ketones Urine Negative Negative mg/dL    Specific Gravity Urine 1.017 1.003 - 1.035    Blood Urine Negative Negative    pH Urine 5.5 4.7 - 8.0    Protein Albumin Urine 10  (A) Negative mg/dL    Urobilinogen Urine Normal Normal, 2.0 mg/dL    Nitrite Urine Negative Negative    Leukocyte Esterase Urine Negative Negative    Mucus Urine Present (A) None Seen /LPF    RBC Urine 1 <=2 /HPF    WBC Urine 1 <=5 /HPF    Squamous Epithelials Urine 0 <=1 /HPF    Narrative    Urine Culture not indicated   Urine Creatinine for Drug Screen Panel     Status: None   Result Value Ref Range    Creatinine Urine for Drug Screen 86 mg/dL   Drug Confirmation Panel Urine with Creat     Status: None (In process)    Narrative    The following orders were created for panel order Drug Confirmation Panel Urine with Creat.  Procedure                               Abnormality         Status                     ---------                               -----------         ------                     Urine Drug Confirmation ...[[id number]]                      In process                 Urine Creatinine for Yossi...[[id number]]                      Final result                 Please view results for these tests on the individual orders.

## 2022-04-02 LAB — VIT B12 SERPL-MCNC: 295 PG/ML (ref 193–986)

## 2022-04-02 ASSESSMENT — ANXIETY QUESTIONNAIRES: GAD7 TOTAL SCORE: 0

## 2022-04-04 ENCOUNTER — TELEPHONE (OUTPATIENT)
Dept: FAMILY MEDICINE | Facility: OTHER | Age: 87
End: 2022-04-04
Payer: MEDICARE

## 2022-04-04 LAB
DEPRECATED CALCIDIOL+CALCIFEROL SERPL-MC: 45 UG/L (ref 20–75)
PSA SERPL-MCNC: 0.07 UG/L (ref 0–4)

## 2022-04-04 RX ORDER — MIRTAZAPINE 7.5 MG/1
7.5 TABLET, FILM COATED ORAL
Refills: 1 | Status: CANCELLED | COMMUNITY
Start: 2022-04-04

## 2022-04-04 NOTE — TELEPHONE ENCOUNTER
Why dont we have them stop it for a couple weeks and see if this helps with fatigue. May restart if not noting any change as this may help with weight, appetite.     Shabnam Burgos MD

## 2022-04-04 NOTE — TELEPHONE ENCOUNTER
Patient daughter called, update in regards to medication mirtazapine 7.5 mg. Patient is currently taking medication, was prescribed by Dr. Nyla Arora. Daughter wants to know, do you want patient on this medication? Please advise.  Dulce Rasmussen LPN

## 2022-04-05 NOTE — TELEPHONE ENCOUNTER
Spoke with daughter Gretchen. Notified of Dr. Burgos's  Recommendation to stop medication for a couple of weeks to see if helps, if not may continue med as it may help with weight and appetite. Daughter verbalized understanding of this plan.

## 2022-04-09 LAB
EDDP CTO UR SCN-MCNC: 1820 NG/ML
EDDP/CREAT UR: 2116 NG/MG {CREAT}
GABAPENTIN UR QL CFM: PRESENT
METHADONE UR CFM-MCNC: 1120 NG/ML
METHADONE/CREAT UR: 1302 NG/MG {CREAT}
OXYCODONE UR CFM-MCNC: 1420 NG/ML
OXYCODONE/CREAT UR: 1651 NG/MG {CREAT}
OXYMORPHONE UR CFM-MCNC: 800 NG/ML
OXYMORPHONE/CREAT UR: 930 NG/MG {CREAT}

## 2022-04-12 DIAGNOSIS — M48.00 SPINAL STENOSIS, UNSPECIFIED SPINAL REGION: ICD-10-CM

## 2022-04-13 RX ORDER — OXYCODONE HYDROCHLORIDE 10 MG/1
10 TABLET ORAL 3 TIMES DAILY
Qty: 90 TABLET | Refills: 0 | Status: SHIPPED | OUTPATIENT
Start: 2022-04-13 | End: 2022-05-11

## 2022-04-13 RX ORDER — METHADONE HYDROCHLORIDE 5 MG/1
TABLET ORAL
Qty: 75 TABLET | Refills: 0 | Status: SHIPPED | OUTPATIENT
Start: 2022-04-13 | End: 2022-05-11

## 2022-04-13 NOTE — TELEPHONE ENCOUNTER
Oxycodone      Last Written Prescription Date:  3/11/22  Last Fill Quantity: 90,   # refills: 0  Last Office Visit: 4/1/22  Future Office visit:    Next 5 appointments (look out 90 days)    May 18, 2022  1:30 PM  (Arrive by 1:15 PM)  Office Visit with Shabnam Burgos MD  [facility] ([facility] ) [address]  [facility] [address]  [phone]           Routing refill request to provider for review/approval because:      Methadone      Last Written Prescription Date:  3/11/22  Last Fill Quantity: 75,   # refills: 0  Last Office Visit: 4/1/22  Future Office visit:    Next 5 appointments (look out 90 days)    May 18, 2022  1:30 PM  (Arrive by 1:15 PM)  Office Visit with Shabnam Burgos MD  [facility] ([facility] ) [address] [facility] [address]  [phone]           Routing refill request to provider for review/approval because:

## 2022-05-10 DIAGNOSIS — M48.00 SPINAL STENOSIS, UNSPECIFIED SPINAL REGION: ICD-10-CM

## 2022-05-11 RX ORDER — OXYCODONE HYDROCHLORIDE 10 MG/1
10 TABLET ORAL 3 TIMES DAILY
Qty: 90 TABLET | Refills: 0 | Status: SHIPPED | OUTPATIENT
Start: 2022-05-11 | End: 2022-05-18

## 2022-05-11 RX ORDER — METHADONE HYDROCHLORIDE 5 MG/1
TABLET ORAL
Qty: 75 TABLET | Refills: 0 | Status: SHIPPED | OUTPATIENT
Start: 2022-05-11 | End: 2022-06-10

## 2022-05-11 NOTE — TELEPHONE ENCOUNTER
Oxycodone      Last Written Prescription Date:  04/13/22  Last Fill Quantity: 90,   # refills: 0  Last Office Visit: 04/01/22  Future Office visit:    Next 5 appointments (look out 90 days)    May 18, 2022  1:30 PM  (Arrive by 1:15 PM)  Office Visit with hSabnam Burgos MD  [facility] ([facility] [address]  [facility] [address]  [phone]         Methadone      Last Written Prescription Date:  04/13/22  Last Fill Quantity: 75,   # refills: 0  Last Office Visit: 04/01/22  Future Office visit:    Next 5 appointments (look out 90 days)    May 18, 2022  1:30 PM  (Arrive by 1:15 PM)  Office Visit with Shabnam Burgos MD  [facility] ([facility] [address] [facility] [address]  [phone]

## 2022-05-17 DIAGNOSIS — R10.13 DYSPEPSIA: ICD-10-CM

## 2022-05-18 ENCOUNTER — OFFICE VISIT (OUTPATIENT)
Dept: FAMILY MEDICINE | Facility: OTHER | Age: 87
End: 2022-05-18
Attending: FAMILY MEDICINE
Payer: MEDICARE

## 2022-05-18 VITALS
TEMPERATURE: 98.1 F | WEIGHT: 170 LBS | DIASTOLIC BLOOD PRESSURE: 68 MMHG | OXYGEN SATURATION: 93 % | SYSTOLIC BLOOD PRESSURE: 140 MMHG | HEIGHT: 63 IN | BODY MASS INDEX: 30.12 KG/M2 | RESPIRATION RATE: 16 BRPM | HEART RATE: 69 BPM

## 2022-05-18 DIAGNOSIS — E53.8 VITAMIN B12 DEFICIENCY (NON ANEMIC): ICD-10-CM

## 2022-05-18 DIAGNOSIS — M48.062 SPINAL STENOSIS OF LUMBAR REGION WITH NEUROGENIC CLAUDICATION: Primary | ICD-10-CM

## 2022-05-18 DIAGNOSIS — R53.82 CHRONIC FATIGUE: ICD-10-CM

## 2022-05-18 DIAGNOSIS — L21.9 SEBORRHEIC DERMATITIS: ICD-10-CM

## 2022-05-18 DIAGNOSIS — R41.89 COGNITIVE IMPAIRMENT: ICD-10-CM

## 2022-05-18 DIAGNOSIS — H61.21 CERUMINOSIS, RIGHT: ICD-10-CM

## 2022-05-18 DIAGNOSIS — F11.90 CHRONIC, CONTINUOUS USE OF OPIOIDS: Chronic | ICD-10-CM

## 2022-05-18 DIAGNOSIS — M48.00 SPINAL STENOSIS, UNSPECIFIED SPINAL REGION: ICD-10-CM

## 2022-05-18 PROCEDURE — G0463 HOSPITAL OUTPT CLINIC VISIT: HCPCS | Performed by: FAMILY MEDICINE

## 2022-05-18 PROCEDURE — 36415 COLL VENOUS BLD VENIPUNCTURE: CPT | Mod: ZL | Performed by: FAMILY MEDICINE

## 2022-05-18 PROCEDURE — 99214 OFFICE O/P EST MOD 30 MIN: CPT | Performed by: FAMILY MEDICINE

## 2022-05-18 PROCEDURE — 82607 VITAMIN B-12: CPT | Mod: ZL | Performed by: FAMILY MEDICINE

## 2022-05-18 RX ORDER — KETOCONAZOLE 20 MG/G
CREAM TOPICAL DAILY
Qty: 60 G | Refills: 0 | Status: SHIPPED | OUTPATIENT
Start: 2022-05-18 | End: 2023-01-01

## 2022-05-18 RX ORDER — GABAPENTIN 300 MG/1
600 CAPSULE ORAL 3 TIMES DAILY
Qty: 300 CAPSULE | Refills: 3 | COMMUNITY
Start: 2022-05-18 | End: 2022-01-01

## 2022-05-18 RX ORDER — OXYCODONE HYDROCHLORIDE 10 MG/1
10 TABLET ORAL 2 TIMES DAILY
Qty: 90 TABLET | Refills: 0 | COMMUNITY
Start: 2022-05-18 | End: 2022-07-01

## 2022-05-18 RX ORDER — OMEPRAZOLE 40 MG/1
CAPSULE, DELAYED RELEASE ORAL
Qty: 90 CAPSULE | Refills: 2 | Status: SHIPPED | OUTPATIENT
Start: 2022-05-18 | End: 2023-01-01

## 2022-05-18 ASSESSMENT — PAIN SCALES - GENERAL: PAINLEVEL: NO PAIN (0)

## 2022-05-18 NOTE — TELEPHONE ENCOUNTER
prilosec      Last Written Prescription Date:  11/16/21  Last Fill Quantity: 90,   # refills: 2  Last Office Visit: 4/1/22  Future Office visit:    Next 5 appointments (look out 90 days)    May 18, 2022  1:30 PM  (Arrive by 1:15 PM)  Office Visit with Shabnam Burgos MD  [facility] [place] ([facility] [place] ) [address]  [place] [address]  [phone]

## 2022-05-18 NOTE — NURSING NOTE
"Chief Complaint   Patient presents with     Hypertension     Results     Recheck Medication       Initial BP (!) 140/68 (BP Location: Left arm, Patient Position: Sitting, Cuff Size: Adult Regular)   Pulse 69   Temp 98.1  F (36.7  C) (Tympanic)   Resp 16   Ht 1.588 m (5' 2.5\")   Wt 77.1 kg (170 lb)   SpO2 93%   BMI 30.60 kg/m   Estimated body mass index is 30.6 kg/m  as calculated from the following:    Height as of this encounter: 1.588 m (5' 2.5\").    Weight as of this encounter: 77.1 kg (170 lb).  Medication Reconciliation: complete  Dulce Rasmussen LPN  "

## 2022-05-18 NOTE — PROGRESS NOTES
Assessment & Plan     Vitamin B12 deficiency (non anemic)  Recheck, pt was low on last check. Previously on injections. Will see if this is coming up.   - Vitamin B12    Seborrheic dermatitis  Around the ears, trial of nizoral.   - ketoconazole (NIZORAL) 2 % external cream  Dispense: 60 g; Refill: 0    Chronic, continuous use of opioids / Spinal stenosis of lumbar region with neurogenic claudication  Pt does have bilateral LE weakness, declines PT today. Gets around his home well with walker. Denies pain. Work on decreasing pain medications, see below.     Chronic fatigue  Patient did stop the remeron which was added, I believe for mood previously. This has made no change in his fatigue or his mood. Will discontinue indefinitely. Does not appear significantly depressed or anxious. There may be some component of apathy related to cog impairment. Meds, also may be contributing to fatigue (as well as falls). Will continue with plan to decrease gabapentin in the evening to 600mg from 900mg. If tolerating, will then also discontinue oxycodone in the middle of the day. Per daughter, pt will often forget and does not appear to have increase in pain     Ceruminosis  Unable to be cleared in office with lavage by nurse. Curette attempted, however, abandoned due to discomfort. Will refer to ENT.     Cognitive impairment  Cognition stable since our last visit, continues to be forgetful, however, continues to manage with ADLs well. No agitation or anxiety.     35 minutes spent on the date of the encounter doing chart review, review of test results, patient visit, documentation and discussion with family     Return in about 2 months (around 7/18/2022) for med check.    Shabnam Burgos MD  [facility] - [facility] Stevenson is a 89 year old who presents for the following health issues:     HPI     Concern - Follow up from 4/1/22  Onset: 4/1/22  Description: Labs, medication   Intensity:  moderate  Progression of Symptoms:  same  Accompanying Signs & Symptoms: Fatigue, sleeps all the time  Previous history of similar problem: Yes  Precipitating factors:        Worsened by: NA  Alleviating factors:        Improved by: NA  Therapies tried and outcome: B12    Hypertension Follow-up      Do you check your blood pressure regularly outside of the clinic? No     Are you following a low salt diet? No    Are your blood pressures ever more than 140 on the top number (systolic) OR more   than 90 on the bottom number (diastolic), for example 140/90? Yes      How many servings of fruits and vegetables do you eat daily?  0-1    On average, how many sweetened beverages do you drink each day (Examples: soda, juice, sweet tea, etc.  Do NOT count diet or artificially sweetened beverages)?   1    How many days per week do you exercise enough to make your heart beat faster? 3 or less    How many minutes a day do you exercise enough to make your heart beat faster? 9 or less    How many days per week do you miss taking your medication? 0    Rash  Onset/Duration: chronic  Description  Location: ears, side of face  Character: scaly  Itching: no  Intensity:  mild  Progression of Symptoms:  waxing and waning  Accompanying signs and symptoms:   Fever: no  Body aches or joint pain: no  Sore throat symptoms: no  Recent cold symptoms: no  History:           Previous episodes of similar rash: None  New exposures:  None  Recent travel: no  Exposure to similar rash: no  Precipitating or alleviating factors: lotion does not help  Therapies tried and outcome: seen by derm and has had AKs frozen.     Concern - ears are plugged.   Onset: right ear    No pain, decreased hearing but uncear if worse than baseline    Pain History:  When did you first notice your pain? - Chronic Pain   Have you seen this provider for your pain in the past?   Yes   Where in your body do you have pain? Back, legs  Are you seeing anyone else for your pain? Yes  "- previously pain clinic    PHQ-9 SCORE 11/14/2017 1/29/2021 4/1/2022   PHQ-9 Total Score 0 2 9       THOMAS-7 SCORE 11/14/2017 1/29/2021 4/1/2022   Total Score 0 0 0     Chronic Pain Follow Up:    Location of pain: back, legs  Analgesia/pain control:    - Recent changes:  none    - Overall control: Fully effective control    - Current treatments: methadone, oxycodone, gabapentin   Adherence:     - Do you ever take more pain medicine than prescribed? No    - When did you take your last dose of pain medicine?  today   Adverse effects: Yes: possibly fatigue   PDMP Review       Value Time User    State PDMP site checked  Yes 5/18/2022  2:27 PM Shabnam Burgos MD        Last CSA Agreement:   CSA -- Patient Level:    Controlled Substance Agreement - Opioid - Scan on 1/24/2022  9:32 AM: OPIOD/OPIOD PLUS CONTROLLED SUBSTANCE AGREEMENT       Last UDS: 4/9/2022    Review of Systems   Constitutional, HEENT, cardiovascular, pulmonary, gi and gu systems are negative, except as otherwise noted.      Objective    BP (!) 140/68 (BP Location: Left arm, Patient Position: Sitting, Cuff Size: Adult Regular)   Pulse 69   Temp 98.1  F (36.7  C) (Tympanic)   Resp 16   Ht 1.588 m (5' 2.5\")   Wt 77.1 kg (170 lb)   SpO2 93%   BMI 30.60 kg/m    Body mass index is 30.6 kg/m .  Physical Exam   GENERAL: alert and no distress  NECK: no adenopathy, no asymmetry, masses, or scars and thyroid normal to palpation  RESP: lungs clear to auscultation - no rales, rhonchi or wheezes  CV: regular rates and rhythm, normal S1 S2, no S3 or S4, no murmur, click or rub and no peripheral edema  ABDOMEN: soft, nontender, no hepatosplenomegaly, no masses and bowel sounds normal  MS: no gross musculoskeletal defects noted, no edema  SKIN: no suspicious lesions or rashes  NEURO: mentation intact, speech normal and memory grossly intact.   PSYCH: mentation appears normal, affect normal/bright    No results found for any visits on 05/18/22.      "

## 2022-05-19 ENCOUNTER — TELEPHONE (OUTPATIENT)
Dept: OTOLARYNGOLOGY | Facility: OTHER | Age: 87
End: 2022-05-19
Payer: MEDICARE

## 2022-05-19 LAB — VIT B12 SERPL-MCNC: 1040 PG/ML (ref 193–986)

## 2022-06-03 ENCOUNTER — OFFICE VISIT (OUTPATIENT)
Dept: OTOLARYNGOLOGY | Facility: OTHER | Age: 87
End: 2022-06-03
Attending: NURSE PRACTITIONER
Payer: MEDICARE

## 2022-06-03 VITALS
HEIGHT: 63 IN | HEART RATE: 75 BPM | DIASTOLIC BLOOD PRESSURE: 66 MMHG | OXYGEN SATURATION: 94 % | WEIGHT: 169 LBS | TEMPERATURE: 98.6 F | BODY MASS INDEX: 29.95 KG/M2 | SYSTOLIC BLOOD PRESSURE: 148 MMHG

## 2022-06-03 DIAGNOSIS — H61.21 IMPACTED CERUMEN OF RIGHT EAR: Primary | ICD-10-CM

## 2022-06-03 DIAGNOSIS — Z90.89 HISTORY OF UVULECTOMY: ICD-10-CM

## 2022-06-03 DIAGNOSIS — H91.93 DECREASED HEARING OF BOTH EARS: ICD-10-CM

## 2022-06-03 PROCEDURE — 69210 REMOVE IMPACTED EAR WAX UNI: CPT | Performed by: NURSE PRACTITIONER

## 2022-06-03 PROCEDURE — G0463 HOSPITAL OUTPT CLINIC VISIT: HCPCS

## 2022-06-03 PROCEDURE — 69210 REMOVE IMPACTED EAR WAX UNI: CPT

## 2022-06-03 ASSESSMENT — PAIN SCALES - GENERAL: PAINLEVEL: NO PAIN (0)

## 2022-06-03 NOTE — LETTER
6/3/2022         RE: Graham Mills  [address] [facility] [address]        Dear Colleague,    Thank you for referring your patient, Graham Mills, to the [facility]. Please see a copy of my visit note below.    Otolaryngology Note         Chief Complaint:     Patient presents with:  Cerumen Impaction: Ear Cleaning            History of Present Illness:     Graham Mills is a 89 year old male who presents today for ear cleaning.  He presents today with his daughter Gretchen.  He has a long history of right sided cerumen impaction.  Attempts to clean at home in the clinic have not been successful.  He has tried wax softening drops in the past without improvement.     + concerns with hearing in the right ear, gradual change over time.  He is not interested in an Audiogram and states he will not wear hearing aids.  No tinnitus, vertigo, facial numbness or weakness.      No otalgia, otorrhea.    No hx of COM or otologic surgeries.   No history of noise exposure.  No family history of hearing loss.    He worked as a .  He wore HP with shooting.           Medications:     Current Outpatient Rx   Medication Sig Dispense Refill     amLODIPine (NORVASC) 5 MG tablet TAKE 1 TABLET DAILY 90 tablet 3     ammonium lactate (AMLACTIN) 12 % external cream        cholecalciferol (VITAMIN D3) 25 mcg (1000 units) capsule 1 capsule daily Take 1 by oral route every day.       Docusate Sodium (COLACE PO) Take 100 mg by mouth 4 tabs daily       ferrous sulfate (IRON) 325 (65 FE) MG tablet Take 1 tablet (325 mg) by mouth daily (with breakfast) 30 tablet 2     fluorouracil (EFUDEX) 5 % external cream        furosemide (LASIX) 20 MG tablet TAKE 1 TABLET (20MG) BY MOUTH ONCE DAILY. 90 tablet 1     gabapentin (NEURONTIN) 300 MG capsule Take 2 capsules (600 mg) by mouth 3 times daily Takes 2cabs every AM, 2 cap afternoon and 3 caps every eveningTakes 300 capsule 3     hydrocortisone 2.5 % cream         hydrocortisone valerate (WEST-ROCKY) 0.2 % ointment Apply sparingly to affected area every other day 60 g 1     ketoconazole (NIZORAL) 2 % external cream Apply topically daily 60 g 0     levothyroxine (SYNTHROID/LEVOTHROID) 75 MCG tablet TAKE 1 TABLET (75MCG) BY MOUTH ONCE DAILY. 60 tablet 0     methadone (DOLOPHINE) 5 MG tablet TAKE 1 TABLET EVERY MORNING AND TAKE 1.5 TABLETS AT BEDTIME 75 tablet 0     nystatin-triamcinolone (MYCOLOG II) cream Apply topically 2 times daily as needed (rash) 30 g 1     omeprazole (PRILOSEC) 40 MG DR capsule TAKE 1 CAPSULE (40MG) BY MOUTH ONCE DAILY. 90 capsule 2     oxyCODONE IR (ROXICODONE) 10 MG tablet Take 1 tablet (10 mg) by mouth 2 times daily 90 tablet 0     triamcinolone (KENALOG) 0.1 % ointment APPLY TOPICALLY 2 TIMES DAILY 80 g 1     vitamin B-12 (CYANOCOBALAMIN) 1000 MCG CR tablet Take 1 tablet (1,000 mcg) by mouth daily 90 tablet 3            Allergies:     Allergies: Augmentin, Flu virus vaccine, and Levofloxacin          Past Medical History:     Past Medical History:   Diagnosis Date     Anemia in chronic kidney disease (CKD) 2/17/2016     Arthritis      CKD (chronic kidney disease) stage 3, GFR 30-59 ml/min (H) 2/17/2016     GERD (gastroesophageal reflux disease) 8/5/2014     Hypothyroidism 5/7/2015     Lumbago 11/11/2010     Obsessive-compulsive disorders 09/07/2001     Osteoarthrosis, unspecified whether generalized or 12/02/2005     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     Restless Legs Syndrome 11/11/2010     Spinal stenosis      Type 2 diabetes mellitus without complication (H) 2/17/2016     Unspecified essential hypertension 01/31/2001     Unspecified transient cerebral ischemia 10/10/2002     Venous (peripheral) insufficiency 4/24/2014            Past Surgical History:     Past Surgical History:   Procedure Laterality Date     BACK SURGERY  2003     CHOLECYSTECTOMY  1970     COLONOSCOPY  2000     hemicolectomy      Polyps, colon      "hiatal hernia  1970     TONSILLECTOMY         ENT family history reviewed         Social History:     Social History     Tobacco Use     Smoking status: Former Smoker     Packs/day: 1.00     Years: 10.00     Pack years: 10.00     Types: Cigarettes, Cigars     Smokeless tobacco: Never Used     Tobacco comment: tried to quit-yes, year quit-1975, no passive exposure   Substance Use Topics     Alcohol use: No     Drug use: No            Review of Systems:     ROS: See HPI         Physical Exam:     BP (!) 148/66 (BP Location: Right arm, Patient Position: Sitting, Cuff Size: Adult Regular)   Pulse 75   Temp 98.6  F (37  C) (Tympanic)   Ht 1.588 m (5' 2.5\")   Wt 76.7 kg (169 lb)   SpO2 94%   BMI 30.42 kg/m      General - The patient is well nourished and well developed, and appears to have good nutritional status.  Alert and oriented to person and place, answers questions and cooperates with examination appropriately.   Head and Face - Normocephalic and atraumatic, with no gross asymmetry noted.  The facial nerve is intact, with strong symmetric movements.  Voice and Breathing - The patient was breathing comfortably without the use of accessory muscles. There was no wheezing, stridor. The patients voice was clear and strong, and had appropriate pitch and quality.  Ears - The ears were examined with binocular microscopy and with otoscope.  External ears normal. Right canal cerumen impacted.  Left canal patent.  The right ear was cleaned with cupped forceps.   Right tympanic membrane is intact without effusion, retraction or mass.  Left tympanic membrane is intact without effusion, retraction or mass.  Eyes - Extraocular movements intact, sclera were not icteric or injected, conjunctiva were pink and moist.  Mouth - Examination of the oral cavity showed pink, healthy oral mucosa. Edentulous upper and lower.   No lesions or ulcerations noted. The tongue was mobile and midline.   Throat - The walls of the oropharynx " were smooth, pink, moist, symmetric, and had no lesions or ulcerations.  The tonsillar pillars and soft palate were symmetric. S/p uvulectomy and tonsillectomy.   Neck - Very limited ROM due to spinal stenosis.  No palpable lymphadenopathy.   Palpation of the thyroid was soft and smooth, with no nodules or goiter appreciated.  The trachea was mobile and midline.  Nose - External contour is symmetric, no gross deflection or scars.  Nasal mucosa is pink and moist with no abnormal mucus.  The septum and turbinates were evaluated with nasal speculum, no polyps, masses, or purulence noted on examination.         Assessment and Plan:       ICD-10-CM    1. Impacted cerumen of right ear  H61.21    2. Decreased hearing of both ears  H91.93    3. History of uvulectomy  Z90.89      He declines audiogram, states he will not wear hearing aids.   Follow up as needed for ear cleaning, new or changes in symptoms.    Ears were cleaned, tolerated well    The ears were cleaned today.  The patient may return here as needed or with their primary physician.  Aural hygiene was discussed.  Avoidance of Q-tips was reiterated.  Avoid flushing the ear canal if there is a possibility of a hole or perforation in the tympanic membrane.   If there are any unresolved concerns regarding hearing loss an audiogram is recommended.      Mayda SYED  [facility]        Again, thank you for allowing me to participate in the care of your patient.        Sincerely,        Mayda James NP

## 2022-06-03 NOTE — NURSING NOTE
"Chief Complaint   Patient presents with     Cerumen Impaction     Ear Cleaning        Initial BP (!) 148/66 (BP Location: Right arm, Patient Position: Sitting, Cuff Size: Adult Regular)   Pulse 75   Temp 98.6  F (37  C) (Tympanic)   Ht 1.588 m (5' 2.5\")   Wt 76.7 kg (169 lb)   SpO2 94%   BMI 30.42 kg/m   Estimated body mass index is 30.42 kg/m  as calculated from the following:    Height as of this encounter: 1.588 m (5' 2.5\").    Weight as of this encounter: 76.7 kg (169 lb).  Medication Reconciliation: complete  Jessica Malik LPN    "
negative...

## 2022-06-03 NOTE — PROGRESS NOTES
Otolaryngology Note         Chief Complaint:     Patient presents with:  Cerumen Impaction: Ear Cleaning            History of Present Illness:     Graham Mills is a 89 year old male who presents today for ear cleaning.  He presents today with his daughter Gretchen.  He has a long history of right sided cerumen impaction.  Attempts to clean at home in the clinic have not been successful.  He has tried wax softening drops in the past without improvement.     + concerns with hearing in the right ear, gradual change over time.  He is not interested in an Audiogram and states he will not wear hearing aids.  No tinnitus, vertigo, facial numbness or weakness.      No otalgia, otorrhea.    No hx of COM or otologic surgeries.   No history of noise exposure.  No family history of hearing loss.    He worked as a .  He wore HP with shooting.           Medications:     Current Outpatient Rx   Medication Sig Dispense Refill     amLODIPine (NORVASC) 5 MG tablet TAKE 1 TABLET DAILY 90 tablet 3     ammonium lactate (AMLACTIN) 12 % external cream        cholecalciferol (VITAMIN D3) 25 mcg (1000 units) capsule 1 capsule daily Take 1 by oral route every day.       Docusate Sodium (COLACE PO) Take 100 mg by mouth 4 tabs daily       ferrous sulfate (IRON) 325 (65 FE) MG tablet Take 1 tablet (325 mg) by mouth daily (with breakfast) 30 tablet 2     fluorouracil (EFUDEX) 5 % external cream        furosemide (LASIX) 20 MG tablet TAKE 1 TABLET (20MG) BY MOUTH ONCE DAILY. 90 tablet 1     gabapentin (NEURONTIN) 300 MG capsule Take 2 capsules (600 mg) by mouth 3 times daily Takes 2cabs every AM, 2 cap afternoon and 3 caps every eveningTakes 300 capsule 3     hydrocortisone 2.5 % cream        hydrocortisone valerate (WEST-RCOKY) 0.2 % ointment Apply sparingly to affected area every other day 60 g 1     ketoconazole (NIZORAL) 2 % external cream Apply topically daily 60 g 0     levothyroxine (SYNTHROID/LEVOTHROID) 75 MCG tablet TAKE 1  TABLET (75MCG) BY MOUTH ONCE DAILY. 60 tablet 0     methadone (DOLOPHINE) 5 MG tablet TAKE 1 TABLET EVERY MORNING AND TAKE 1.5 TABLETS AT BEDTIME 75 tablet 0     nystatin-triamcinolone (MYCOLOG II) cream Apply topically 2 times daily as needed (rash) 30 g 1     omeprazole (PRILOSEC) 40 MG DR capsule TAKE 1 CAPSULE (40MG) BY MOUTH ONCE DAILY. 90 capsule 2     oxyCODONE IR (ROXICODONE) 10 MG tablet Take 1 tablet (10 mg) by mouth 2 times daily 90 tablet 0     triamcinolone (KENALOG) 0.1 % ointment APPLY TOPICALLY 2 TIMES DAILY 80 g 1     vitamin B-12 (CYANOCOBALAMIN) 1000 MCG CR tablet Take 1 tablet (1,000 mcg) by mouth daily 90 tablet 3            Allergies:     Allergies: Augmentin, Flu virus vaccine, and Levofloxacin          Past Medical History:     Past Medical History:   Diagnosis Date     Anemia in chronic kidney disease (CKD) 2/17/2016     Arthritis      CKD (chronic kidney disease) stage 3, GFR 30-59 ml/min (H) 2/17/2016     GERD (gastroesophageal reflux disease) 8/5/2014     Hypothyroidism 5/7/2015     Lumbago 11/11/2010     Obsessive-compulsive disorders 09/07/2001     Osteoarthrosis, unspecified whether generalized or 12/02/2005     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     Restless Legs Syndrome 11/11/2010     Spinal stenosis      Type 2 diabetes mellitus without complication (H) 2/17/2016     Unspecified essential hypertension 01/31/2001     Unspecified transient cerebral ischemia 10/10/2002     Venous (peripheral) insufficiency 4/24/2014            Past Surgical History:     Past Surgical History:   Procedure Laterality Date     BACK SURGERY  2003     CHOLECYSTECTOMY  1970     COLONOSCOPY  2000     hemicolectomy      Polyps, colon     hiatal hernia  1970     TONSILLECTOMY         ENT family history reviewed         Social History:     Social History     Tobacco Use     Smoking status: Former Smoker     Packs/day: 1.00     Years: 10.00     Pack years: 10.00     Types: Cigarettes,  "Cigars     Smokeless tobacco: Never Used     Tobacco comment: tried to quit-yes, year quit-1975, no passive exposure   Substance Use Topics     Alcohol use: No     Drug use: No            Review of Systems:     ROS: See HPI         Physical Exam:     BP (!) 148/66 (BP Location: Right arm, Patient Position: Sitting, Cuff Size: Adult Regular)   Pulse 75   Temp 98.6  F (37  C) (Tympanic)   Ht 1.588 m (5' 2.5\")   Wt 76.7 kg (169 lb)   SpO2 94%   BMI 30.42 kg/m      General - The patient is well nourished and well developed, and appears to have good nutritional status.  Alert and oriented to person and place, answers questions and cooperates with examination appropriately.   Head and Face - Normocephalic and atraumatic, with no gross asymmetry noted.  The facial nerve is intact, with strong symmetric movements.  Voice and Breathing - The patient was breathing comfortably without the use of accessory muscles. There was no wheezing, stridor. The patients voice was clear and strong, and had appropriate pitch and quality.  Ears - The ears were examined with binocular microscopy and with otoscope.  External ears normal. Right canal cerumen impacted.  Left canal patent.  The right ear was cleaned with cupped forceps.   Right tympanic membrane is intact without effusion, retraction or mass.  Left tympanic membrane is intact without effusion, retraction or mass.  Eyes - Extraocular movements intact, sclera were not icteric or injected, conjunctiva were pink and moist.  Mouth - Examination of the oral cavity showed pink, healthy oral mucosa. Edentulous upper and lower.   No lesions or ulcerations noted. The tongue was mobile and midline.   Throat - The walls of the oropharynx were smooth, pink, moist, symmetric, and had no lesions or ulcerations.  The tonsillar pillars and soft palate were symmetric. S/p uvulectomy and tonsillectomy.   Neck - Very limited ROM due to spinal stenosis.  No palpable lymphadenopathy.   Palpation " of the thyroid was soft and smooth, with no nodules or goiter appreciated.  The trachea was mobile and midline.  Nose - External contour is symmetric, no gross deflection or scars.  Nasal mucosa is pink and moist with no abnormal mucus.  The septum and turbinates were evaluated with nasal speculum, no polyps, masses, or purulence noted on examination.         Assessment and Plan:       ICD-10-CM    1. Impacted cerumen of right ear  H61.21    2. Decreased hearing of both ears  H91.93    3. History of uvulectomy  Z90.89      He declines audiogram, states he will not wear hearing aids.   Follow up as needed for ear cleaning, new or changes in symptoms.    Ears were cleaned, tolerated well    The ears were cleaned today.  The patient may return here as needed or with their primary physician.  Aural hygiene was discussed.  Avoidance of Q-tips was reiterated.  Avoid flushing the ear canal if there is a possibility of a hole or perforation in the tympanic membrane.   If there are any unresolved concerns regarding hearing loss an audiogram is recommended.      Mayda SYED  [facility]

## 2022-06-03 NOTE — PATIENT INSTRUCTIONS
Thank you for allowing Mayda James and our ENT team to participate in your care.  If your medications are too expensive, please give the nurse a call.  We can possibly change this medication.  If you have a scheduling or an appointment question please contact our Health Unit Coordinator at their direct line [phone] ext [phone].   ALL nursing questions or concerns can be directed to your ENT nurse at: [phone] - ann     Follow up as needed

## 2022-06-09 DIAGNOSIS — M48.00 SPINAL STENOSIS, UNSPECIFIED SPINAL REGION: ICD-10-CM

## 2022-06-09 DIAGNOSIS — R60.0 PERIPHERAL EDEMA: ICD-10-CM

## 2022-06-09 DIAGNOSIS — I10 BENIGN ESSENTIAL HYPERTENSION: ICD-10-CM

## 2022-06-10 RX ORDER — AMLODIPINE BESYLATE 5 MG/1
TABLET ORAL
Qty: 90 TABLET | Refills: 3 | Status: SHIPPED | OUTPATIENT
Start: 2022-06-10 | End: 2023-01-01

## 2022-06-10 RX ORDER — FUROSEMIDE 20 MG
TABLET ORAL
Qty: 90 TABLET | Refills: 1 | Status: SHIPPED | OUTPATIENT
Start: 2022-06-10 | End: 2022-01-01

## 2022-06-10 RX ORDER — METHADONE HYDROCHLORIDE 5 MG/1
TABLET ORAL
Qty: 75 TABLET | Refills: 0 | Status: SHIPPED | OUTPATIENT
Start: 2022-06-10 | End: 2022-07-08

## 2022-06-10 NOTE — TELEPHONE ENCOUNTER
lasix      Last Written Prescription Date:  12/21/21  Last Fill Quantity: 90,   # refills: 1  Last Office Visit: 5/18/22  Future Office visit:    Next 5 appointments (look out 90 days)    Jul 29, 2022  3:30 PM  (Arrive by 3:15 PM)  SHORT with Shabnam Burgos MD  [facility] [place] ([facility] [place] ) [address]  [place] [address]  [phone]         norvasc      Last Written Prescription Date:  6/23/21  Last Fill Quantity: 90,   # refills: 3  Last Office Visit: 5/18/22  Future Office visit:    Next 5 appointments (look out 90 days)    Jul 29, 2022  3:30 PM  (Arrive by 3:15 PM)  SHORT with Shabnam Burgos MD  [facility] [place] ([facility] [place] ) [address]  [place] [address]  [phone]

## 2022-06-10 NOTE — TELEPHONE ENCOUNTER
methadone      Last Written Prescription Date:  5/11/22  Last Fill Quantity: 75,   # refills: 0  Last Office Visit: 5/18/22  Future Office visit:    Next 5 appointments (look out 90 days)    Jul 29, 2022  3:30 PM  (Arrive by 3:15 PM)  SHORT with Shabnam Burgos MD  [facility] [place] ([facility] [place] ) [address]  [place] [address]  [phone]

## 2022-06-28 ENCOUNTER — NURSE TRIAGE (OUTPATIENT)
Dept: FAMILY MEDICINE | Facility: OTHER | Age: 87
End: 2022-06-28

## 2022-06-28 NOTE — TELEPHONE ENCOUNTER
6/28/2022 2:05 PM  Pt's daughter called reports patient has chest pain that radiates to neck, dizziness, weakness, forgetful. Pt lives alone. Sx started yesterday.  Advised to call 911 go to ER immediately. Pt's daughter agrees to plan.     Reason for Disposition    [1] Chest pain lasts > 5 minutes AND [2] age > 44    Protocols used: CHEST PAIN-A-AH

## 2022-06-29 DIAGNOSIS — M48.00 SPINAL STENOSIS, UNSPECIFIED SPINAL REGION: ICD-10-CM

## 2022-07-01 RX ORDER — GABAPENTIN 300 MG/1
CAPSULE ORAL
Qty: 630 CAPSULE | Refills: 0 | OUTPATIENT
Start: 2022-07-01

## 2022-07-01 RX ORDER — OXYCODONE HYDROCHLORIDE 10 MG/1
TABLET ORAL
Qty: 90 TABLET | Refills: 0 | Status: SHIPPED | OUTPATIENT
Start: 2022-07-01 | End: 2022-08-11

## 2022-07-01 NOTE — TELEPHONE ENCOUNTER
oxyCODONE IR (ROXICODONE) 10 MG tablet      Last Written Prescription Date:  05/18/22  Last Fill Quantity: 90,   # refills: 0  Last Office Visit: 05/18/22  Future Office visit:    Next 5 appointments (look out 90 days)    Jul 29, 2022  3:30 PM  (Arrive by 3:15 PM)  SHORT with Shabnam Burgos MD  [facility] ([facility] [place] ) [address]  [place] [address]  [phone]           Routing refill request to provider for review/approval because:  Drug not on the [facility], [facility] or  Health refill protocol or controlled substance

## 2022-07-08 DIAGNOSIS — M48.00 SPINAL STENOSIS, UNSPECIFIED SPINAL REGION: ICD-10-CM

## 2022-07-08 RX ORDER — METHADONE HYDROCHLORIDE 5 MG/1
TABLET ORAL
Qty: 75 TABLET | Refills: 0 | Status: SHIPPED | OUTPATIENT
Start: 2022-07-08 | End: 2022-08-12

## 2022-07-08 NOTE — TELEPHONE ENCOUNTER
Methadone      Last Written Prescription Date:  6.10.22  Last Fill Quantity: #75,   # refills: 0  Last Office Visit: 5.18.22  Future Office visit:    Next 5 appointments (look out 90 days)    Jul 29, 2022  3:30 PM  (Arrive by 3:15 PM)  SHORT with Shabnam Burgos MD  [facility] ([facility] [place] ) [address]  [place] [address]  [phone]           Routing refill request to provider for review/approval because:  Drug not on the [facility], [facility] or [facility] refill protocol or controlled substance

## 2022-07-29 ENCOUNTER — OFFICE VISIT (OUTPATIENT)
Dept: FAMILY MEDICINE | Facility: OTHER | Age: 87
End: 2022-07-29
Attending: FAMILY MEDICINE
Payer: MEDICARE

## 2022-07-29 VITALS
TEMPERATURE: 98.4 F | BODY MASS INDEX: 30.12 KG/M2 | RESPIRATION RATE: 16 BRPM | HEIGHT: 63 IN | SYSTOLIC BLOOD PRESSURE: 128 MMHG | OXYGEN SATURATION: 96 % | DIASTOLIC BLOOD PRESSURE: 58 MMHG | HEART RATE: 68 BPM | WEIGHT: 170 LBS

## 2022-07-29 DIAGNOSIS — M48.062 SPINAL STENOSIS OF LUMBAR REGION WITH NEUROGENIC CLAUDICATION: Primary | ICD-10-CM

## 2022-07-29 DIAGNOSIS — F11.90 CHRONIC, CONTINUOUS USE OF OPIOIDS: Chronic | ICD-10-CM

## 2022-07-29 PROBLEM — J18.9 CAP (COMMUNITY ACQUIRED PNEUMONIA): Status: RESOLVED | Noted: 2018-11-16 | Resolved: 2022-07-29

## 2022-07-29 PROCEDURE — 99214 OFFICE O/P EST MOD 30 MIN: CPT | Performed by: FAMILY MEDICINE

## 2022-07-29 PROCEDURE — G0463 HOSPITAL OUTPT CLINIC VISIT: HCPCS | Performed by: FAMILY MEDICINE

## 2022-07-29 ASSESSMENT — ANXIETY QUESTIONNAIRES
5. BEING SO RESTLESS THAT IT IS HARD TO SIT STILL: NOT AT ALL
GAD7 TOTAL SCORE: 0
GAD7 TOTAL SCORE: 0
7. FEELING AFRAID AS IF SOMETHING AWFUL MIGHT HAPPEN: NOT AT ALL
6. BECOMING EASILY ANNOYED OR IRRITABLE: NOT AT ALL
3. WORRYING TOO MUCH ABOUT DIFFERENT THINGS: NOT AT ALL
2. NOT BEING ABLE TO STOP OR CONTROL WORRYING: NOT AT ALL
4. TROUBLE RELAXING: NOT AT ALL
1. FEELING NERVOUS, ANXIOUS, OR ON EDGE: NOT AT ALL

## 2022-07-29 ASSESSMENT — PAIN SCALES - GENERAL: PAINLEVEL: NO PAIN (0)

## 2022-07-29 ASSESSMENT — PATIENT HEALTH QUESTIONNAIRE - PHQ9: SUM OF ALL RESPONSES TO PHQ QUESTIONS 1-9: 0

## 2022-07-29 NOTE — NURSING NOTE
"Chief Complaint   Patient presents with     Musculoskeletal Problem     Recheck Medication       Initial /58 (BP Location: Left arm, Patient Position: Sitting, Cuff Size: Adult Regular)   Pulse 68   Temp 98.4  F (36.9  C) (Tympanic)   Resp 16   Ht 1.588 m (5' 2.5\")   Wt 77.1 kg (170 lb)   SpO2 96%   BMI 30.60 kg/m   Estimated body mass index is 30.6 kg/m  as calculated from the following:    Height as of this encounter: 1.588 m (5' 2.5\").    Weight as of this encounter: 77.1 kg (170 lb).  Medication Reconciliation: complete  Dulce Rasmussen LPN  "

## 2022-07-29 NOTE — PROGRESS NOTES
Assessment & Plan     Spinal stenosis of lumbar region with neurogenic claudication / Chronic, continuous use of opioids  Pt has cut back on oxycodone and gabapentin (mid day doses that he would forget). No change in pain levels. Continues to be very off balance. We discussed methadone today and consideration of taper down on this. Pt and daughter would be interested in down titration due to concerns about ability to continue this med in the NH, etc. He has done very well from pain standpoint with decrease in gabapentin and oxycodone. No weight loss with cessation of remeron. Given long half life of methadone, will get pharmacy involved to assist with slow titration.     25 minutes spent on the date of the encounter doing chart review, review of test results, interpretation of tests, patient visit and documentation     Return in about 1 month (around 8/29/2022) for virtual.    Shabnam Burgos MD  [facility] JOSE Stevenson is a 89 year old accompanied by his daughter, presenting for the following health issues:    Musculoskeletal Problem and Recheck Medication    HPI     Pain History:  When did you first notice your pain? - Chronic Pain   Have you seen this provider for your pain in the past?   Yes   Where in your body do you have pain? Low back and knees down  Are you seeing anyone else for your pain? No    PHQ-9 SCORE 1/29/2021 4/1/2022 7/29/2022   PHQ-9 Total Score 2 9 0       THOMAS-7 SCORE 1/29/2021 4/1/2022 7/29/2022   Total Score 0 0 0       PEG Score 11/5/2021 7/29/2022   PEG Total Score 6 0       Chronic Pain Follow Up:    Location of pain: Low back knees to feet  Analgesia/pain control:    - Recent changes:  improved    - Overall control: Fully effective control    - Current treatments: Pain medication   Adherence:     - Do you ever take more pain medicine than prescribed? No    - When did you take your last dose of pain medicine?  Today at 1pm   Adverse effects: No   PDMP  "Review       Value Time User    State PDMP site checked  Yes 5/18/2022  2:27 PM Shabnam Burgos MD        Last CSA Agreement:   CSA -- Patient Level:    Controlled Substance Agreement - Opioid - Scan on 1/24/2022  9:32 AM: OPIOD/OPIOD PLUS CONTROLLED SUBSTANCE AGREEMENT       Last UDS: 4/9/2022    PEG Pain Assessment 7/29/2022   What number best describes your pain on average in the past week? 0   What number best describes how, during the past week, pain has interfered with your enjoyment of life? 0   What number best describes how, during the past week, pain has interfered with your general activity? 0   PEG Total Score 0         Review of Systems   Constitutional, HEENT, cardiovascular, pulmonary, gi and gu systems are negative, except as otherwise noted.      Objective    /58 (BP Location: Left arm, Patient Position: Sitting, Cuff Size: Adult Regular)   Pulse 68   Temp 98.4  F (36.9  C) (Tympanic)   Resp 16   Ht 1.588 m (5' 2.5\")   Wt 77.1 kg (170 lb)   SpO2 96%   BMI 30.60 kg/m    Body mass index is 30.6 kg/m .  Physical Exam   GENERAL: alert and no distress  EYES: Eyes grossly normal to inspection  NECK: no adenopathy, no asymmetry, masses, or scars and thyroid normal to palpation  RESP: lungs clear to auscultation - no rales, rhonchi or wheezes  CV: regular rate and rhythm, normal S1 S2, no S3 or S4, no murmur, click or rub, no peripheral edema and peripheral pulses strong  MS: no gross musculoskeletal defects noted, no edema  SKIN: no suspicious lesions or rashes  PSYCH: mentation appears normal, affect flat    No results found for any visits on 07/29/22.          .  ..  "

## 2022-08-03 DIAGNOSIS — E03.9 HYPOTHYROIDISM, UNSPECIFIED TYPE: ICD-10-CM

## 2022-08-04 RX ORDER — LEVOTHYROXINE SODIUM 75 UG/1
TABLET ORAL
Qty: 60 TABLET | Refills: 0 | Status: SHIPPED | OUTPATIENT
Start: 2022-08-04 | End: 2022-01-01

## 2022-08-04 NOTE — TELEPHONE ENCOUNTER
Pt of     Synthroid     Last Written Prescription Date:  6.1.2022  Last Fill Quantity: 60,   # refills: 0  Last Office Visit: 7.29.2022  Future Office visit:    Next 5 appointments (look out 90 days)    Aug 12, 2022  3:00 PM  [facility] with Swapnil [facility] ([facility]) [address] [place] [id number] [phone]           Routing refill request to provider for review/approval because:     Thyroid Protocol Failed 08/03/2022 01:32 PM   Protocol Details  Normal TSH on file in past 12 months        Chanel Oneal RN

## 2022-08-11 DIAGNOSIS — M48.00 SPINAL STENOSIS, UNSPECIFIED SPINAL REGION: ICD-10-CM

## 2022-08-11 NOTE — PROGRESS NOTES
Addendum 8/19/22: Printed copy of AVS to mail to patient.     Medication Therapy Management (MTM) Encounter    ASSESSMENT:                            Medication Adherence/Access: No issues identified    Chronic Pain: From the CDC, current MME per day is 80 MME (12.5 mg of methadone * 4 (conversion factor for methadone daily doses between 1 and 20 mg) = 50 MME, 20 mg of oxycodone * 1.5 (conversion factor) = 30 MME; 50 MME + 30 MME = 80 MME total per day). An ideal tapering scenario would be 10% per month reduction of MME to taper slowly. Dr. Burgos would like to taper methadone first. With a methadone conversion factor of 4, there is not a dose reduction that will reduce exactly 8 MME, however, if reduction of the methadone dose by 2.5 mg at bedtime is warranted, that is a 10 MME reduction (12.5% reduction). Patient may benefit from tapering dose to methadone 5 mg twice daily and monitoring for adverse effects or signs of withdrawal. This is a more conservative approach to tapering to try and mitigate any adverse effects.     Hypertension: Stable. Patient is around blood pressure goal of < 140/90mmHg. No changes necessary at this time.     GERD: Stable. No issues identified.     Hypothyroidism: Stable. No issues identified.     Skin: Patient would benefit from trying a different hydrating topical preparation such as Madeleine's Working Hands lotion on hands to see if that helps moisturize the dry skin they are experiencing.    Constipation: Stable. No issues identified.     Supplements: Stable. No issues identified.     PLAN:                            The following recommendations are being made directly to Dr. Shabnam Burgos MD:  1. Consider reducing the dose of methadone to 5 mg twice daily (a 12.5% reduction in MME). I will follow up with the patient next week to see how they are doing.     The following recommendations are being made directly to the patient:  1. Try using Madeleine's Working Hands cream to see if  that helps with the dry skin you are dealing with on your hands.     Follow-up: 1 month, sooner if needed.     SUBJECTIVE/OBJECTIVE:                          Graham Mills is a 89 year old male coming in for an initial visit. He was referred to me from Dr. Shabnam Burgos MD. Patient was accompanied by his daughter, Gretchen.     Reason for visit: Initial visit - opioid tapering.    Allergies/ADRs: Reviewed in chart  Past Medical History: Reviewed in chart  Tobacco: He reports that he has quit smoking. His smoking use included cigarettes and cigars. He has a 10.00 pack-year smoking history. He has never used smokeless tobacco.  Alcohol: none  Caffeine: very little.     Medication Adherence/Access: no issues reported  Patient uses pill box(es).  Patient takes medications 3 time(s) per day.   Per patient, misses medication 0 times per week.   Medication barriers: none.     Chronic Pain:  Current medications include: oxycodone 10 mg twice daily, methadone 5 mg in the morning and 7.5 mg in the evening, and gabapentin 600 mg three times daily. How is your pain? Fully effective control.  Are you able to do your normal activities? Can do everything I need to.  Are you able to sleep? Normal sleep. Patient reports the following side effects:  Sedation. Patient and daughter are interested in tapering methadone to see if the current dosing is needed. Patient and daughter are not interested in switching to methadone liquid if it was needed for tapering.     Hypertension: Current medications include amlodipine 5 mg daily and furosemide 20 mg daily. Patient reports no current medication side effects.  Does not monitor weights daily, does not notice edema especially with using ERICH stockings.   BP Readings from Last 3 Encounters:   08/12/22 (!) 140/80   07/29/22 128/58   06/03/22 (!) 148/66     GERD: Current medications include: Prilosec (omeprazole) 40 mg once daily. Patient reports no current symptoms.  Patient feels that current  regimen is effective.    Hypothyroidism: Patient is taking levothyroxine 75 mcg daily. Patient is having the following symptoms: none.   TSH   Date Value Ref Range Status   07/30/2021 2.58 0.40 - 4.00 mU/L Final   12/30/2020 2.372 0.350 - 4.800 mIU/mL Final     Skin: Current topical medications include ammonium lactate 12% cream, ketoconazole 2% cream daily, Vaseline alternating with hydrocortisone every other day (as needed) for dry skin on legs, and Dr. Aaron's lotion for dry skin. Patient reports dry skin on hands that his current lotion does not help with.     Constipation: Current medications include docusate 400 mg with dinner. Patient reports no issues.     Supplements: Current supplements include cholecalciferol 125 mcg daily, cyanocobalamin CR 1000 mcg daily, ferrous sulfate 325 mg daily with breakfast, and Preservision AREDS 2 twice daily. Patient reports no issues.     Most recent vitamin B12 level was 1040 pg/mL (upper limit is 986 pg/mL).   Most recent vitamin D level was 45 mcg/L (within normal limits).     Today's Vitals: BP (!) 140/80 (BP Location: Right arm, Patient Position: Sitting, Cuff Size: Adult Regular)   ----------------  I spent 50 minutes with this patient today. I offer these suggestions for consideration by Dr. Shabnam Burgos MD. A copy of the visit note was provided to the patient's provider(s).    The patient was sent via [employer] a summary of these recommendations.     Swapnil Singh, PharmD  Medication Therapy Management Pharmacist  [facility]  Office phone: [phone]     Medication Therapy Recommendations  Actinic keratosis    Rationale: Synergistic therapy - Needs additional medication therapy - Indication   Recommendation: Start Medication - OKeeffes Working Hands Crea   Status: Accepted - no CPA Needed         Chronic, continuous use of opioids    Current Medication: methadone (DOLOPHINE) 5 MG tablet (Discontinued)   Rationale: Dose too high - Dosage too high -  Safety   Recommendation: Decrease Dose - methadone 5 MG tablet   Status: Accepted per Provider   Note: Start tapering methadone - start with 5 mg twice daily.

## 2022-08-11 NOTE — TELEPHONE ENCOUNTER
Methadone       Last Written Prescription Date:  7/8/22  Last Fill Quantity: 75,   # refills: 0    Roxicodone       Last Written Prescription Date:  7/1/22  Last Fill Quantity: 90,   # refills: 0  Last Office Visit: 7/29/22  Future Office visit:    Next 5 appointments (look out 90 days)    Aug 12, 2022  3:00 PM  [facility] with Swapnil Singh [facility] ([facility]) [address] [place] [id number] [phone]

## 2022-08-12 ENCOUNTER — OFFICE VISIT (OUTPATIENT)
Dept: PHARMACY | Facility: PHYSICIAN GROUP | Age: 87
End: 2022-08-12
Attending: FAMILY MEDICINE
Payer: COMMERCIAL

## 2022-08-12 VITALS — DIASTOLIC BLOOD PRESSURE: 80 MMHG | SYSTOLIC BLOOD PRESSURE: 140 MMHG

## 2022-08-12 DIAGNOSIS — E03.9 HYPOTHYROIDISM, UNSPECIFIED TYPE: ICD-10-CM

## 2022-08-12 DIAGNOSIS — K59.00 CONSTIPATION, UNSPECIFIED CONSTIPATION TYPE: ICD-10-CM

## 2022-08-12 DIAGNOSIS — L57.0 ACTINIC KERATOSIS: ICD-10-CM

## 2022-08-12 DIAGNOSIS — Z78.9 TAKES DIETARY SUPPLEMENTS: ICD-10-CM

## 2022-08-12 DIAGNOSIS — F11.90 CHRONIC, CONTINUOUS USE OF OPIOIDS: Primary | ICD-10-CM

## 2022-08-12 DIAGNOSIS — I10 BENIGN ESSENTIAL HYPERTENSION: ICD-10-CM

## 2022-08-12 DIAGNOSIS — K21.9 GASTROESOPHAGEAL REFLUX DISEASE WITHOUT ESOPHAGITIS: ICD-10-CM

## 2022-08-12 PROCEDURE — 99607 MTMS BY PHARM ADDL 15 MIN: CPT

## 2022-08-12 PROCEDURE — 99605 MTMS BY PHARM NP 15 MIN: CPT

## 2022-08-12 RX ORDER — OXYCODONE HYDROCHLORIDE 10 MG/1
10 TABLET ORAL 2 TIMES DAILY
Qty: 90 TABLET | Refills: 0 | Status: SHIPPED | OUTPATIENT
Start: 2022-08-12 | End: 2022-08-15

## 2022-08-12 RX ORDER — METHADONE HYDROCHLORIDE 5 MG/1
5 TABLET ORAL EVERY 12 HOURS
Qty: 60 TABLET | Refills: 0 | Status: SHIPPED | OUTPATIENT
Start: 2022-08-12 | End: 2022-08-15

## 2022-08-15 RX ORDER — METHADONE HYDROCHLORIDE 5 MG/1
5 TABLET ORAL EVERY 12 HOURS
Qty: 60 TABLET | Refills: 0 | Status: SHIPPED | OUTPATIENT
Start: 2022-08-15 | End: 2022-09-09

## 2022-08-15 RX ORDER — OXYCODONE HYDROCHLORIDE 10 MG/1
10 TABLET ORAL 2 TIMES DAILY
Qty: 60 TABLET | Refills: 0 | Status: SHIPPED | OUTPATIENT
Start: 2022-08-15 | End: 2022-01-01

## 2022-08-15 NOTE — TELEPHONE ENCOUNTER
Pharmacy asking for clarification on prescriptions.  Directions updated to reflect dose taper.  Please review and sign if appropriate.

## 2022-08-15 NOTE — PATIENT INSTRUCTIONS
"Recommendations from today's MTM visit:                                                    MTM (medication therapy management) is a service provided by a clinical pharmacist designed to help you get the most of out of your medicines.   Today we reviewed what your medicines are for, how to know if they are working, that your medicines are safe and how to make your medicine regimen as easy as possible.      1. Try using Madeleine's Working Hands cream to see if that helps with the dry skin you are dealing with on your hands.     The following recommendations are being made directly to Dr. Shabnam Burgos MD:  1. Consider reducing the dose of methadone to 5 mg twice daily (a 12.5% reduction in MME). I will follow up with the patient next week to see how they are doing.     Follow-up: 1 month, sooner if needed.    It was great speaking with you today.  I value your experience and would be very thankful for your time in providing feedback in our clinic survey. In the next few days, you may receive an email or text message from [email or web address] with a link to a survey related to your  clinical pharmacist.\"     To schedule another MTM appointment, please call the clinic directly or you may call the MTM scheduling line at [phone] or toll-free at [phone].     My Clinical Pharmacist's contact information:                                                      Please feel free to contact me with any questions or concerns you have.      Swapnil Singh, PharmD  Medication Therapy Management Pharmacist  [facility]  Office phone: [phone]     "

## 2022-09-09 ENCOUNTER — VIRTUAL VISIT (OUTPATIENT)
Dept: FAMILY MEDICINE | Facility: OTHER | Age: 87
End: 2022-09-09
Attending: FAMILY MEDICINE
Payer: MEDICARE

## 2022-09-09 DIAGNOSIS — F11.90 CHRONIC, CONTINUOUS USE OF OPIOIDS: Primary | Chronic | ICD-10-CM

## 2022-09-09 DIAGNOSIS — M48.00 SPINAL STENOSIS, UNSPECIFIED SPINAL REGION: ICD-10-CM

## 2022-09-09 DIAGNOSIS — F03.90 DEMENTIA WITHOUT BEHAVIORAL DISTURBANCE, UNSPECIFIED DEMENTIA TYPE: ICD-10-CM

## 2022-09-09 PROCEDURE — 99441 PR PHYSICIAN TELEPHONE EVALUATION 5-10 MIN: CPT | Mod: 95 | Performed by: FAMILY MEDICINE

## 2022-09-09 RX ORDER — METHADONE HYDROCHLORIDE 5 MG/1
5 TABLET ORAL EVERY 12 HOURS
Qty: 60 TABLET | Refills: 0 | Status: SHIPPED | OUTPATIENT
Start: 2022-09-09 | End: 2022-01-01

## 2022-09-09 NOTE — PROGRESS NOTES
Graham is a 89 year old who is being evaluated via a billable telephone visit.      What phone number would you like to be contacted at? [phone]  How would you like to obtain your AVS? Mail a copy    Assessment & Plan     Spinal stenosis, unspecified spinal region / Chronic, continuous use of opioids  Continue with taper to 5 and 2.5mg x 4 weeks. Then may decrease to 2.5mg bid x 4 weeks, etc. Follow up scheduled. Call anytime with new or worseningsx.   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 Has good support started b12 supplement. No acute or focal neuro sx to warrant head imaging at this time, defer.     12 minutes spent on the date of the encounter doing chart review, interpretation of tests, patient visit, documentation and discussion with family     Return in about 2 months (around 11/9/2022).    Shabnam Burgos MD  [facility] [place]    Subjective   Graham is a 89 year old, presenting for the following health issues:    No chief complaint on file.    HPI     Medication Review    Patient has been tolerating taper of methadone without issue. No diarrhea, pain in the legs or back. No nausea, sweats, gerd sx.     Continues to have short term memory issues, occasional confusion at times, worsening over the last 3-6 mo or more. No agitation, sleep impairment, anxiety, hallucinations.       Review of Systems   Constitutional, HEENT, cardiovascular, pulmonary, gi and gu systems are negative, except as otherwise noted.      Objective           Vitals:  No vitals were obtained today due to virtual visit.    Physical Exam   healthy, alert and no distress  PSYCH: Alert and oriented times 3; coherent speech, normal   rate and volume, able to articulate logical thoughts, able   to  abstract reason, no tangential thoughts, no hallucinations   or delusions  His affect is normal  RESP: No cough, no audible wheezing, able to talk in full sentences  Remainder of exam unable to be completed due to telephone visits    No results found for any visits on 09/09/22.      Phone call duration: 8 minutes

## 2022-09-10 DIAGNOSIS — R60.0 PERIPHERAL EDEMA: ICD-10-CM

## 2022-09-10 DIAGNOSIS — M48.00 SPINAL STENOSIS, UNSPECIFIED SPINAL REGION: ICD-10-CM

## 2022-09-15 NOTE — TELEPHONE ENCOUNTER
Oxycodone      Last Written Prescription Date:  8/15/22  Last Fill Quantity: 60,   # refills: 0  Last Office Visit: 9/9/22  Future Office visit:    Next 5 appointments (look out 90 days)    Dec 07, 2022  2:30 PM  (Arrive by 2:15 PM)  SHORT with Shabnam Burgos MD  [facility] [place] ([facility] [place] ) [address] [place] [address]  [phone]           Routing refill request to provider for review/approval because:      Lasix      Last Written Prescription Date:  6/10/22  Last Fill Quantity: 90,   # refills: 1  Last Office Visit: 9/9/22  Future Office visit:    Next 5 appointments (look out 90 days)    Dec 07, 2022  2:30 PM  (Arrive by 2:15 PM)  SHORT with Shabnam Burgos MD  [facility] [place] ([facility] [place] ) [address]  [place] [address]  [phone]           Routing refill request to provider for review/approval because:

## 2022-10-04 NOTE — TELEPHONE ENCOUNTER
levothyroxine      Last Written Prescription Date:  8/4/22  Last Fill Quantity: 60,   # refills: 0  Last Office Visit: 9/9/22  Future Office visit:    Next 5 appointments (look out 90 days)    Dec 07, 2022  2:30 PM  (Arrive by 2:15 PM)  SHORT with Shabnam Burgos MD  [facility] [place] ([facility] [place] ) [address]  [place] [address]  [phone]

## 2022-10-14 NOTE — TELEPHONE ENCOUNTER
Gabapentin      Last Written Prescription Date:  5/18/2022  Last Fill Quantity: 300,   # refills: 0  Last Office Visit: 09/09/2022

## 2022-10-17 NOTE — TELEPHONE ENCOUNTER
Oxycodone      Last Written Prescription Date:  9.16.22  Last Fill Quantity: #60,   # refills: 0  Last Office Visit: 9.9.22  Future Office visit:    Next 5 appointments (look out 90 days)    Dec 07, 2022  2:30 PM  (Arrive by 2:15 PM)  SHORT with Shabnam Burgos MD  [facility] ([facility] [place] ) [address]  [place] [address]  [phone]           Routing refill request to provider for review/approval because:  Drug not on the [facility], [facility] or [facility] refill protocol or controlled substance

## 2022-11-04 NOTE — TELEPHONE ENCOUNTER
Methadone      Last Written Prescription Date:  09/09/22  Last Fill Quantity: 60,   # refills: 0  Last Office Visit: 09/09/22  Future Office visit:    Next 5 appointments (look out 90 days)    Dec 07, 2022  2:30 PM  (Arrive by 2:15 PM)  SHORT with Shabnam Burgos MD  [facility] [place] ([facility] [place] ) [address]  [place] [address]  [phone]

## 2022-11-04 NOTE — TELEPHONE ENCOUNTER
Tapering down per last note: Spinal stenosis, unspecified spinal region / Chronic, continuous use of opioids  Continue with taper to 5 and 2.5mg x 4 weeks. Then may decrease to 2.5mg bid x 4 weeks, etc. Follow up scheduled. Call anytime with new or worseningsx.   - methadone (DOLOPHINE) 5 MG tablet  Dispense: 60 tablet; Refill: 0    2.5mg BID #30 pended in this encounter.

## 2022-11-14 NOTE — RESULT ENCOUNTER NOTE
Starting to see some elevation of WBC, neutrophil, and Sed rate. Will cover for early community acquired pneumonia with two antibiotics for coverage. Encourage a probiotic daily for 1 months but not required.

## 2022-11-14 NOTE — NURSING NOTE
"Chief Complaint   Patient presents with     Cough       Initial /70 (BP Location: Right arm, Patient Position: Sitting, Cuff Size: Adult Regular)   Pulse 76   Temp 98.5  F (36.9  C) (Tympanic)   Resp 28   SpO2 95%  Estimated body mass index is 30.6 kg/m  as calculated from the following:    Height as of 7/29/22: 1.588 m (5' 2.5\").    Weight as of 7/29/22: 77.1 kg (170 lb).  Medication Reconciliation: complete  Aure Obregon LPN    "

## 2022-11-14 NOTE — PROGRESS NOTES
Assessment & Plan     Acute cough  Reviewed plan to support with prednisone burst and await lab and swab results. Suspect RSV based on symptoms reported of children (runny nose and congested cough) along with hx of negative COVID at home test.  Lungs do sound reassuring. Discussed that if labs return with any suspicion of bacterial pneumonia, we will treat with antibiotics in addition to the prednisone.  Patient and daughter in agreement with plan  - Symptomatic; Unknown Influenza A/B & SARS-CoV2 (COVID-19) Virus PCR Multiplex; Future  - Symptomatic; Unknown Influenza A/B & SARS-CoV2 (COVID-19) Virus PCR Multiplex Nose  - *UA reflex [facility] Microscopic and Culture [facility]; Future  - CBC with platelets and differential; Future  - ESR: Erythrocyte sedimentation rate; Future  - XR CHEST 2 VW (Clinic Performed); Future  - CBC with platelets and differential  - ESR: Erythrocyte sedimentation rate  - predniSONE (DELTASONE) 20 MG tablet; Take 1 tablet (20 mg) by mouth daily for 5 days    Other coronavirus as the cause of diseases classified elsewhere  - Symptomatic; Unknown Influenza A/B & SARS-CoV2 (COVID-19) Virus PCR Multiplex; Future  - Symptomatic; Unknown Influenza A/B & SARS-CoV2 (COVID-19) Virus PCR Multiplex Nose    Viral respiratory infection  - predniSONE (DELTASONE) 20 MG tablet; Take 1 tablet (20 mg) by mouth daily for 5 days    Bronchitis  - predniSONE (DELTASONE) 20 MG tablet; Take 1 tablet (20 mg) by mouth daily for 5 days    Community acquired pneumonia of left lower lobe of lung  Xray and blood work reviewed. He was unable to void. Will treat for CAP. Viral panel is still pending.   - cefpodoxime (VANTIN) 200 MG tablet; Take 1 tablet (200 mg) by mouth 2 times daily for 5 days  - azithromycin (ZITHROMAX) 250 MG tablet; Take 2 tablets (500 mg) by mouth daily for 1 day, THEN 1 tablet (250 mg) daily for 4 days.    Ordering of each unique test  Prescription drug management  I spent a total of 30  "minutes on the day of the visit.   Time spent doing chart review, history and exam, documentation and further activities per the note     BMI:   Estimated body mass index is 30.6 kg/m  as calculated from the following:    Height as of 7/29/22: 1.588 m (5' 2.5\").    Weight as of 7/29/22: 77.1 kg (170 lb).   Weight management plan: Discussed healthy diet and exercise guidelines      No follow-ups on file.    Lisa Farias, CNP  [facility]    Tania Stevenson is a 89 year old accompanied by his daughter, presenting for the following health issues:  Cough      HPI     Acute Illness  Acute illness concerns: cough.  Daughter reports that he did have grandchildren who visited and were sick. He started having a cough that that has progressed to productive cough and seems much more fatigued than his normal. She has checked for COVID. Negative COVID 5 days ago on 11/9/22.  Graham states that when he coughs it sounds like croup. Has been taking coricidan DM. Pt told his daughter today that he feels worse today than the previous week.  Onset/Duration: 8 days.   Symptoms:  Fever: No  Chills/Sweats: No  Headache (location?): No  Sinus Pressure: No  Conjunctivitis:  YES  Ear Pain: no  Rhinorrhea: YES  Congestion: YES  Sore Throat: No  Cough: YES- dark yellow/brown. Denies blood  Wheeze: No  Decreased Appetite: YES  Nausea: No  Vomiting: No  Diarrhea: No  Dysuria/Freq.: No  Dysuria or Hematuria: No  Fatigue/Achiness: YES  Sick/Strep Exposure: YES- grandkids, great grandkids  Therapies tried and outcome: coricidan DM and increased fluids      Review of Systems   Constitutional, HEENT, cardiovascular, pulmonary, gi and gu systems are negative, except as otherwise noted.      Objective    /70 (BP Location: Right arm, Patient Position: Sitting, Cuff Size: Adult Regular)   Pulse 76   Temp 98.5  F (36.9  C) (Tympanic)   Resp 28   SpO2 95%   There is no height or weight on file to calculate " BMI.  Physical Exam   GENERAL: alert, no distress, frail, elderly and fatigued  EYES: Eyes grossly normal to inspection, PERRL and conjunctivae and sclerae normal  HENT: ear canals and TM's normal, nose and mouth without ulcers or lesions  NECK: no adenopathy, no asymmetry, masses, or scars and thyroid normal to palpation  RESP: Fine crackles throughout that are intermittent. No consolidation.  Single cough during visit - non-productive a bit harsh sounding.   CV: regular rate and rhythm, normal S1 S2, no S3 or S4, no murmur, click or rub, no peripheral edema and peripheral pulses strong  SKIN: no suspicious lesions or rashes  NEURO: Normal strength and tone, mentation intact and speech normal  PSYCH: mentation appears normal, affect flat, fatigued, judgement and insight impaired and appearance well groomed    Results for orders placed or performed in visit on 11/14/22   XR CHEST 2 VW (Clinic Performed)     Status: None    Narrative    XR CHEST 2 VIEWS    HISTORY: 89 years Male Acute cough    COMPARISON: None    TECHNIQUE: 2 views of the chest were obtained.    FINDINGS: Two views of the chest were obtained. Heart size and  pulmonary vascularity are within normal limits. There is mild  atelectasis at the left lung base. Lungs are otherwise clear.        Impression    IMPRESSION: Mild left basilar atelectasis. The lungs are otherwise  clear.    MARICRUZ DELGADO MD         SYSTEM ID:  [id number]   Results for orders placed or performed in visit on 11/14/22   Symptomatic; Unknown Influenza A/B & SARS-CoV2 (COVID-19) Virus PCR Multiplex Nose     Status: Normal    Specimen: Nose; Swab   Result Value Ref Range    Influenza A PCR Negative Negative    Influenza B PCR Negative Negative    RSV PCR Negative Negative    SARS CoV2 PCR Negative Negative    Narrative    Testing was performed using the Xpert Xpress CoV2/Flu/RSV Assay on the Cepheid GeneXpert Instrument. This test should be ordered for the detection of SARS-CoV-2 and  influenza viruses in individuals who meet clinical and/or epidemiological criteria. Test performance is unknown in asymptomatic patients. This test is for in vitro diagnostic use under the FDA EUA for laboratories certified under CLIA to perform high or moderate complexity testing. This test has not been FDA cleared or approved. A negative result does not rule out the presence of PCR inhibitors in the specimen or target RNA in concentration below the limit of detection for the assay. If only one viral target is positive but coinfection with multiple targets is suspected, the sample should be re-tested with another FDA cleared, approved, or authorized test, if coinfection would change clinical management. This test was validated by the [facility]. These laboratories are certified under the Clinical Laboratory Improvement Amendments of 1988 (CLIA-88) as qualified to perform high complexity laboratory testing.   ESR: Erythrocyte sedimentation rate     Status: Abnormal   Result Value Ref Range    Erythrocyte Sedimentation Rate 37 (H) 0 - 20 mm/hr   Extra Tube     Status: None    Narrative    The following orders were created for panel order Extra Tube.  Procedure                               Abnormality         Status                     ---------                               -----------         ------                     Extra Serum Separator Tu...[[id number]]                      Final result               Extra Green Top (Lithium...[[id number]]                      Final result                 Please view results for these tests on the individual orders.   CBC with platelets and differential     Status: Abnormal   Result Value Ref Range    WBC Count 11.7 (H) 4.0 - 11.0 10e3/uL    RBC Count 4.14 (L) 4.40 - 5.90 10e6/uL    Hemoglobin 13.2 (L) 13.3 - 17.7 g/dL    Hematocrit 39.7 (L) 40.0 - 53.0 %    MCV 96 78 - 100 fL    MCH 31.9 26.5 - 33.0 pg    MCHC 33.2 31.5 - 36.5 g/dL    RDW 13.4 10.0 - 15.0 %     Platelet Count 228 150 - 450 10e3/uL    % Neutrophils 80 %    % Lymphocytes 11 %    % Monocytes 8 %    % Eosinophils 1 %    % Basophils 0 %    Absolute Neutrophils 9.4 (H) 1.6 - 8.3 10e3/uL    Absolute Lymphocytes 1.3 0.8 - 5.3 10e3/uL    Absolute Monocytes 0.9 0.0 - 1.3 10e3/uL    Absolute Eosinophils 0.1 0.0 - 0.7 10e3/uL    Absolute Basophils 0.0 0.0 - 0.2 10e3/uL   Extra Serum Separator Tube (SST)     Status: None   Result Value Ref Range    Hold Specimen JIC    Extra Green Top (Lithium Heparin) Tube     Status: None   Result Value Ref Range    Hold Specimen JIC    RBC and Platelet Morphology     Status: None   Result Value Ref Range    Platelet Assessment  Automated Count Confirmed. Platelet morphology is normal.     Automated Count Confirmed. Platelet morphology is normal.    RBC Morphology Confirmed RBC Indices    CBC with platelets and differential     Status: Abnormal    Narrative    The following orders were created for panel order CBC with platelets and differential.  Procedure                               Abnormality         Status                     ---------                               -----------         ------                     CBC with platelets and d...[[id number]]  Abnormal            Final result               RBC and Platelet Morphology[[id number]]                      Final result                 Please view results for these tests on the individual orders.

## 2022-11-15 NOTE — TELEPHONE ENCOUNTER
oxyCODONE IR      Last Written Prescription Date:  10/17/22  Last Fill Quantity: 60,   # refills: 0  Last Office Visit: 11/14/22  Future Office visit:    Next 5 appointments (look out 90 days)    Dec 07, 2022  2:30 PM  (Arrive by 2:15 PM)  SHORT with Shabnam Burgos MD  [facility] [place] ([facility] [place] ) [address]  [place] [address]  [phone]           Routing refill request to provider for review/approval because:

## 2022-12-07 PROBLEM — A41.9 SEPSIS (H): Status: RESOLVED | Noted: 2018-11-16 | Resolved: 2022-01-01

## 2022-12-07 PROBLEM — D72.829 LEUKOCYTOSIS: Status: RESOLVED | Noted: 2018-11-16 | Resolved: 2022-01-01

## 2022-12-07 NOTE — TELEPHONE ENCOUNTER
Synthroid       Last Written Prescription Date:  10/4/22  Last Fill Quantity: 60,   # refills: 0  Last Office Visit: 11/14/22  Future Office visit:    Next 5 appointments (look out 90 days)    Dec 07, 2022  2:30 PM  (Arrive by 2:15 PM)  SHORT with Shabnam Burgos MD  [facility] [place] ([facility] [place] ) [address]  [place] [address]  [phone]

## 2022-12-07 NOTE — PROGRESS NOTES
Assessment & Plan     Cognitive Decline  Discussed at some length today. 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 Daughter is caretaking and providing cues to take mediations, eat, etc. Has one other woman helping with lunch a couple times a week. Is interested in further support and aware this type of care may be out of pocket. CC referral was placed. Primary goal is for pt to be comfortable at his home for as long as possible. B12 and thyroid wnl. Defer imaging, daughter in agreement as no neuro s/s. Aricept discussed, will defer at this time after discussion.   - Primary Care - Care Coordination Referral    Spinal stenosis of lumbar region with neurogenic claudication  Now off methadone completely. Pain levels have not increased. May continue plan for taper of oxycodone to 5mg and 10mg during the day, then 5mg bid, and then 5mg daily. From then on, may use prn only. May decrease dose every 2-4 weeks. Pharmacy has been assisting with taper and appreciate their ongoing support.     Stage 3 chronic kidney disease, unspecified whether stage 3a or 3b CKD (H)  Stable  - Comprehensive metabolic panel (BMP + Alb, Alk Phos, ALT, AST, Total. Bili, TP)  - Comprehensive metabolic panel (BMP + Alb, Alk Phos, ALT, AST, Total. Bili, TP)    Anemia in stage 3 chronic kidney disease, unspecified whether stage 3a or 3b CKD (H)  Stable  - CBC with platelets and differential  - Primary Care - Care Coordination Referral  - CBC with platelets and differential    Benign essential hypertension  Controlled, no recent falls, no dizziness.     Hypothyroidism, unspecified type  Stable  - TSH with free T4 reflex  - TSH with free T4 reflex    Prediabetes  High today, now in diabetes range no change in plan of care.   - Hemoglobin A1c  - Hemoglobin A1c    30  minutes spent on the date of the encounter doing chart review, review of test  results, interpretation of tests, patient visit and documentation     Shabnam Burgos MD  [facility] [place]    Subjective   Graham Mills is a 89 year old accompanied by his daughter, presenting for the following health issues:  Lipids, Diabetes, Hypertension, and Thyroid Problem      HPI     Diabetes Follow-up      How often are you checking your blood sugar? Not at all    What concerns do you have today about your diabetes? None     Do you have any of these symptoms? (Select all that apply)  No numbness or tingling in feet.  No redness, sores or blisters on feet.  No complaints of excessive thirst.  No reports of blurry vision.  No significant changes to weight.    Have you had a diabetic eye exam in the last 12 months? Yes- Date of last eye exam: Fall,  Location: 2021    Hyperlipidemia Follow-Up      Are you regularly taking any medication or supplement to lower your cholesterol?   No    Are you having muscle aches or other side effects that you think could be caused by your cholesterol lowering medication?  No    Hypertension Follow-up      Do you check your blood pressure regularly outside of the clinic? Yes     Are you following a low salt diet? No    Are your blood pressures ever more than 140 on the top number (systolic) OR more   than 90 on the bottom number (diastolic), for example 140/90? Yes    BP Readings from Last 2 Encounters:   12/07/22 136/64   11/14/22 132/70     Hemoglobin A1C (%)   Date Value   12/07/2022 6.6 (H)   04/01/2022 6.1 (H)   10/27/2020 6.4 (H)   11/14/2017 6.0     LDL Cholesterol Calculated (mg/dL)   Date Value   07/30/2021 127 (H)   11/14/2017 107 (H)   09/20/2016 84       Hypothyroidism Follow-up      Since last visit, patient describes the following symptoms: Weight stable, no hair loss, no skin changes, no constipation, no loose stools    Pain History:  When did you first notice your pain? - Chronic Pain   Have you seen this provider for your pain in the past?    Yes   Where in your body do you have pain? Low back legs  Are you seeing anyone else for your pain? No    PHQ-9 SCORE 4/1/2022 7/29/2022 12/7/2022   PHQ-9 Total Score 9 0 0       THOMAS-7 SCORE 4/1/2022 7/29/2022 12/7/2022   Total Score 0 0 0       PEG Score 11/5/2021 7/29/2022 12/7/2022   PEG Total Score 6 0 0       PEG Score 11/5/2021 7/29/2022 12/7/2022   PEG Total Score 6 0 0       Chronic Pain Follow Up:    Location of pain: Low back, low legs  Analgesia/pain control:    - Recent changes:  N0    - Overall control: Fully effective control    - Current treatments: Pain medication   Adherence:     - Do you ever take more pain medicine than prescribed? No    - When did you take your last dose of pain medicine?  This morning   Adverse effects: No     PDMP Review       Value Time User    State PDMP site checked  Yes 12/7/2022  3:06 PM Shabnam Burgos MD        Last CSA Agreement:   CSA -- Patient Level:     [Media Unavailable] Controlled Substance Agreement - Opioid - Scan on 1/24/2022  9:32 AM: OPIOD/OPIOD PLUS CONTROLLED SUBSTANCE AGREEMENT       Last UDS: 4/9/2022      PEG Pain Assessment 12/7/2022   What number best describes your pain on average in the past week? 0   What number best describes how, during the past week, pain has interfered with your enjoyment of life? 0   What number best describes how, during the past week, pain has interfered with your general activity? 0   PEG Total Score 0     Concern - memory  Onset: over the last couple years, worsening over the last couple of months  Description: short term memory loss  Intensity: moderate  Progression of Symptoms:  worsening  Accompanying Signs & Symptoms: no personality changes, no agitation, anxiety. Needing cues to take medications, assist with setting up, unreliable historian  Previous history of similar problem: no  Precipitating factors:        Worsened by: na  Alleviating factors:        Improved by: na  Therapies tried and outcome:  none     "  Daughter already driving, managing medications, etc. Pt also no longer using stove, minimal microwave use due to safety concerns. Recently started changing thermostat which has led to some cold days at his home. Has helper in the home for lunch a couple of days a week who also helps with some household chores. Daughter otherwise tries to be over regularly to ensure he eats and takes medications.     Review of Systems   Constitutional, HEENT, cardiovascular, pulmonary, gi and gu systems are negative, except as otherwise noted.      Objective    /64 (BP Location: Left arm, Patient Position: Sitting, Cuff Size: Adult Regular)   Pulse 75   Temp 99  F (37.2  C) (Tympanic)   Resp 16   Ht 1.588 m (5' 2.5\")   Wt 80.3 kg (177 lb)   SpO2 97%   BMI 31.86 kg/m    Body mass index is 31.86 kg/m .  Physical Exam   GENERAL: healthy, alert and no distress  NECK: no adenopathy, no asymmetry, masses, or scars and thyroid normal to palpation  RESP: lungs clear to auscultation - no rales, rhonchi or wheezes  CV: regular rates and rhythm, normal S1 S2, no S3 or S4, no murmur, click or rub and no peripheral edema  ABDOMEN: soft, nontender, no hepatosplenomegaly, no masses and bowel sounds normal  MS: no gross musculoskeletal defects noted, no edema  SKIN: no suspicious lesions or rashes  NEURO: mentation intact, speech normal, cranial nerves 2-12grossly intact   PSYCH: mentation appears normal, somewhat inattentive likely secondary to hearing deficit, affect flat    Results for orders placed or performed in visit on 12/07/22   Extra Tube     Status: None    Narrative    The following orders were created for panel order Extra Tube.  Procedure                               Abnormality         Status                     ---------                               -----------         ------                     Extra Green Top (Lithium...[[id number]]                      Final result               Extra Purple Top Tube[[id number]]     "                        Final result                 Please view results for these tests on the individual orders.   Extra Green Top (Lithium Heparin) Tube     Status: None   Result Value Ref Range    Hold Specimen JIC    Extra Purple Top Tube     Status: None   Result Value Ref Range    Hold Specimen JIC    Hemoglobin A1c     Status: Abnormal   Result Value Ref Range    Estimated Average Glucose 143 mg/dL    Hemoglobin A1C 6.6 (H) <5.7 %   Comprehensive metabolic panel (BMP + Alb, Alk Phos, ALT, AST, Total. Bili, TP)     Status: Abnormal   Result Value Ref Range    Sodium 138 136 - 145 mmol/L    Potassium 4.0 3.4 - 5.3 mmol/L    Chloride 98 98 - 107 mmol/L    Carbon Dioxide (CO2) 27 22 - 29 mmol/L    Anion Gap 13 7 - 15 mmol/L    Urea Nitrogen 12.2 8.0 - 23.0 mg/dL    Creatinine 1.35 (H) 0.67 - 1.17 mg/dL    Calcium 9.2 8.8 - 10.2 mg/dL    Glucose 109 (H) 70 - 99 mg/dL    Alkaline Phosphatase 108 40 - 129 U/L    AST 22 10 - 50 U/L    ALT 13 10 - 50 U/L    Protein Total 6.9 6.4 - 8.3 g/dL    Albumin 4.2 3.5 - 5.2 g/dL    Bilirubin Total 0.3 <=1.2 mg/dL    GFR Estimate 50 (L) >60 mL/min/1.73m2   TSH with free T4 reflex     Status: Normal   Result Value Ref Range    TSH 1.53 0.30 - 4.20 uIU/mL   CBC with platelets and differential     Status: Abnormal   Result Value Ref Range    WBC Count 8.3 4.0 - 11.0 10e3/uL    RBC Count 4.12 (L) 4.40 - 5.90 10e6/uL    Hemoglobin 12.9 (L) 13.3 - 17.7 g/dL    Hematocrit 39.2 (L) 40.0 - 53.0 %    MCV 95 78 - 100 fL    MCH 31.3 26.5 - 33.0 pg    MCHC 32.9 31.5 - 36.5 g/dL    RDW 13.6 10.0 - 15.0 %    Platelet Count 216 150 - 450 10e3/uL    % Neutrophils 59 %    % Lymphocytes 26 %    % Monocytes 9 %    % Eosinophils 4 %    % Basophils 1 %    % Immature Granulocytes 1 %    NRBCs per 100 WBC 0 <1 /100    Absolute Neutrophils 5.0 1.6 - 8.3 10e3/uL    Absolute Lymphocytes 2.2 0.8 - 5.3 10e3/uL    Absolute Monocytes 0.7 0.0 - 1.3 10e3/uL    Absolute Eosinophils 0.3 0.0 - 0.7 10e3/uL     Absolute Basophils 0.0 0.0 - 0.2 10e3/uL    Absolute Immature Granulocytes 0.1 <=0.4 10e3/uL    Absolute NRBCs 0.0 10e3/uL   CBC with platelets and differential     Status: Abnormal    Narrative    The following orders were created for panel order CBC with platelets and differential.  Procedure                               Abnormality         Status                     ---------                               -----------         ------                     CBC with platelets and d...[[id number]]  Abnormal            Final result                 Please view results for these tests on the individual orders.

## 2022-12-14 NOTE — PROGRESS NOTES
Clinic Care Coordination Contact  Care Team Conversations    CC called patient and he states not being able to hear RN CC and asks that I call his daughter.  RN CC called daughter Gretchen.  She states she isn't wanting any information on Alzheimer's.  We discussed dementia and she is educated on dementia types.  Patient daughter also educated on prompting to eat and how to monitor. She verbalized understanding. She states Dad is needing a bit more prompting but doing fine overall.  States that she lives next door and she has 2 other sisters that help.  They are not wanting to keep him home as long as they can.  States that her  assists with him when needed.  She states they are looking to see about help in the afternoon and weekends.  She is also stating he might be open to someone assisting with a shower once a week. We discussed 2016 CC note providing resources for Senior Linkage Line and she states they haven't yet called.  She is educated on Sr Linkage Line.  RN CC also let her know that I would have SW reach out to them to discuss services and see what or if patient qualifies for.  Patient income is 1205 social security, 210 pension and 66 medical reimbursement.  She states she owns the house he is living in.  She is open to SW call.  RN CC has mailed Sr. Linkage Line and Resource book for daughter Gretchen.  No further outreach will be provided by [facility] VALENZUELA.  Cristal Leger RN  Care Coordination

## 2022-12-14 NOTE — Clinical Note
Natasha if you could please reach out to patient daughter Gretchen in regards to what patient may qualify for and respite type services/pca.  Please see my note.  Thank you. Cristal Leger RN CC

## 2022-12-15 NOTE — CARE PLAN
Care Transitions focused note:      Per clinic CC referral, called pt's daughter Gretchen to discuss options/resources for assistance caring for the pt.//Recommended she call [facility] for a MNChoice Assessment [phone].

## 2023-01-01 ENCOUNTER — APPOINTMENT (OUTPATIENT)
Dept: OCCUPATIONAL THERAPY | Facility: HOSPITAL | Age: 88
End: 2023-01-01
Attending: HOSPITALIST
Payer: MEDICARE

## 2023-01-01 ENCOUNTER — PATIENT OUTREACH (OUTPATIENT)
Dept: CARE COORDINATION | Facility: OTHER | Age: 88
End: 2023-01-01

## 2023-01-01 ENCOUNTER — OFFICE VISIT (OUTPATIENT)
Dept: FAMILY MEDICINE | Facility: OTHER | Age: 88
End: 2023-01-01
Attending: FAMILY MEDICINE
Payer: MEDICARE

## 2023-01-01 ENCOUNTER — APPOINTMENT (OUTPATIENT)
Dept: GENERAL RADIOLOGY | Facility: HOSPITAL | Age: 88
End: 2023-01-01
Attending: STUDENT IN AN ORGANIZED HEALTH CARE EDUCATION/TRAINING PROGRAM
Payer: MEDICARE

## 2023-01-01 ENCOUNTER — MYC MEDICAL ADVICE (OUTPATIENT)
Dept: FAMILY MEDICINE | Facility: OTHER | Age: 88
End: 2023-01-01

## 2023-01-01 ENCOUNTER — APPOINTMENT (OUTPATIENT)
Dept: CARDIOLOGY | Facility: HOSPITAL | Age: 88
End: 2023-01-01
Attending: HOSPITALIST
Payer: MEDICARE

## 2023-01-01 ENCOUNTER — DOCUMENTATION ONLY (OUTPATIENT)
Dept: FAMILY MEDICINE | Facility: OTHER | Age: 88
End: 2023-01-01

## 2023-01-01 ENCOUNTER — TELEPHONE (OUTPATIENT)
Dept: FAMILY MEDICINE | Facility: OTHER | Age: 88
End: 2023-01-01

## 2023-01-01 ENCOUNTER — APPOINTMENT (OUTPATIENT)
Dept: PHYSICAL THERAPY | Facility: HOSPITAL | Age: 88
End: 2023-01-01
Attending: HOSPITALIST
Payer: MEDICARE

## 2023-01-01 ENCOUNTER — DOCUMENTATION ONLY (OUTPATIENT)
Dept: CASE MANAGEMENT | Facility: HOSPITAL | Age: 88
End: 2023-01-01

## 2023-01-01 ENCOUNTER — MEDICAL CORRESPONDENCE (OUTPATIENT)
Dept: HEALTH INFORMATION MANAGEMENT | Facility: HOSPITAL | Age: 88
End: 2023-01-01

## 2023-01-01 ENCOUNTER — ANCILLARY PROCEDURE (OUTPATIENT)
Dept: GENERAL RADIOLOGY | Facility: OTHER | Age: 88
End: 2023-01-01
Attending: FAMILY MEDICINE
Payer: MEDICARE

## 2023-01-01 ENCOUNTER — HOSPITAL ENCOUNTER (OUTPATIENT)
Facility: HOSPITAL | Age: 88
Setting detail: OBSERVATION
Discharge: HOME | End: 2023-07-22
Attending: STUDENT IN AN ORGANIZED HEALTH CARE EDUCATION/TRAINING PROGRAM | Admitting: HOSPITALIST
Payer: MEDICARE

## 2023-01-01 ENCOUNTER — DOCUMENTATION ONLY (OUTPATIENT)
Dept: CARE COORDINATION | Facility: CLINIC | Age: 88
End: 2023-01-01
Payer: MEDICARE

## 2023-01-01 VITALS
DIASTOLIC BLOOD PRESSURE: 67 MMHG | TEMPERATURE: 97.3 F | OXYGEN SATURATION: 97 % | HEART RATE: 76 BPM | SYSTOLIC BLOOD PRESSURE: 135 MMHG | BODY MASS INDEX: 31.01 KG/M2 | WEIGHT: 175 LBS | HEIGHT: 63 IN | RESPIRATION RATE: 18 BRPM

## 2023-01-01 VITALS
OXYGEN SATURATION: 96 % | DIASTOLIC BLOOD PRESSURE: 66 MMHG | HEIGHT: 63 IN | SYSTOLIC BLOOD PRESSURE: 138 MMHG | RESPIRATION RATE: 16 BRPM | BODY MASS INDEX: 30.83 KG/M2 | TEMPERATURE: 98.4 F | WEIGHT: 174 LBS | HEART RATE: 78 BPM

## 2023-01-01 VITALS
RESPIRATION RATE: 20 BRPM | OXYGEN SATURATION: 97 % | WEIGHT: 171.96 LBS | TEMPERATURE: 97.3 F | HEART RATE: 78 BPM | DIASTOLIC BLOOD PRESSURE: 71 MMHG | HEIGHT: 62 IN | BODY MASS INDEX: 31.64 KG/M2 | SYSTOLIC BLOOD PRESSURE: 144 MMHG

## 2023-01-01 VITALS
DIASTOLIC BLOOD PRESSURE: 78 MMHG | SYSTOLIC BLOOD PRESSURE: 127 MMHG | OXYGEN SATURATION: 96 % | HEART RATE: 72 BPM | TEMPERATURE: 97.5 F

## 2023-01-01 DIAGNOSIS — I10 BENIGN ESSENTIAL HYPERTENSION: ICD-10-CM

## 2023-01-01 DIAGNOSIS — M48.062 SPINAL STENOSIS OF LUMBAR REGION WITH NEUROGENIC CLAUDICATION: ICD-10-CM

## 2023-01-01 DIAGNOSIS — M48.062 SPINAL STENOSIS OF LUMBAR REGION WITH NEUROGENIC CLAUDICATION: Primary | ICD-10-CM

## 2023-01-01 DIAGNOSIS — U07.1 INFECTION DUE TO 2019 NOVEL CORONAVIRUS: ICD-10-CM

## 2023-01-01 DIAGNOSIS — E11.9 TYPE 2 DIABETES MELLITUS WITHOUT COMPLICATION, WITHOUT LONG-TERM CURRENT USE OF INSULIN (H): ICD-10-CM

## 2023-01-01 DIAGNOSIS — F03.B0 MODERATE DEMENTIA WITHOUT BEHAVIORAL DISTURBANCE, PSYCHOTIC DISTURBANCE, MOOD DISTURBANCE, OR ANXIETY, UNSPECIFIED DEMENTIA TYPE (H): Primary | ICD-10-CM

## 2023-01-01 DIAGNOSIS — E03.9 HYPOTHYROIDISM, UNSPECIFIED TYPE: ICD-10-CM

## 2023-01-01 DIAGNOSIS — M48.00 SPINAL STENOSIS, UNSPECIFIED SPINAL REGION: ICD-10-CM

## 2023-01-01 DIAGNOSIS — R05.2 SUBACUTE COUGH: ICD-10-CM

## 2023-01-01 DIAGNOSIS — R05.3 CHRONIC COUGH: ICD-10-CM

## 2023-01-01 DIAGNOSIS — N18.30 STAGE 3 CHRONIC KIDNEY DISEASE, UNSPECIFIED WHETHER STAGE 3A OR 3B CKD (H): ICD-10-CM

## 2023-01-01 DIAGNOSIS — U07.1 COVID-19: Primary | ICD-10-CM

## 2023-01-01 DIAGNOSIS — R60.0 PERIPHERAL EDEMA: ICD-10-CM

## 2023-01-01 DIAGNOSIS — R10.13 DYSPEPSIA: ICD-10-CM

## 2023-01-01 DIAGNOSIS — F03.B0 MODERATE DEMENTIA WITHOUT BEHAVIORAL DISTURBANCE, PSYCHOTIC DISTURBANCE, MOOD DISTURBANCE, OR ANXIETY, UNSPECIFIED DEMENTIA TYPE (H): ICD-10-CM

## 2023-01-01 DIAGNOSIS — G25.81 RESTLESS LEGS SYNDROME (RLS): ICD-10-CM

## 2023-01-01 DIAGNOSIS — R63.4 WEIGHT LOSS: ICD-10-CM

## 2023-01-01 DIAGNOSIS — D64.9 ANEMIA: ICD-10-CM

## 2023-01-01 DIAGNOSIS — D63.1 ANEMIA IN STAGE 3 CHRONIC KIDNEY DISEASE, UNSPECIFIED WHETHER STAGE 3A OR 3B CKD (H): ICD-10-CM

## 2023-01-01 DIAGNOSIS — E61.1 IRON DEFICIENCY: ICD-10-CM

## 2023-01-01 DIAGNOSIS — E87.70 HYPERVOLEMIA, UNSPECIFIED HYPERVOLEMIA TYPE: ICD-10-CM

## 2023-01-01 DIAGNOSIS — M48.00 SPINAL STENOSIS, UNSPECIFIED SPINAL REGION: Primary | ICD-10-CM

## 2023-01-01 DIAGNOSIS — R79.9 ABNORMAL FINDING OF BLOOD CHEMISTRY, UNSPECIFIED: ICD-10-CM

## 2023-01-01 DIAGNOSIS — I20.0 UNSTABLE ANGINA PECTORIS (H): Primary | ICD-10-CM

## 2023-01-01 DIAGNOSIS — N18.30 ANEMIA IN STAGE 3 CHRONIC KIDNEY DISEASE, UNSPECIFIED WHETHER STAGE 3A OR 3B CKD (H): ICD-10-CM

## 2023-01-01 LAB
ALBUMIN SERPL BCG-MCNC: 4.2 G/DL (ref 3.5–5.2)
ALP SERPL-CCNC: 117 U/L (ref 40–129)
ALT SERPL W P-5'-P-CCNC: 14 U/L (ref 10–50)
ANION GAP SERPL CALCULATED.3IONS-SCNC: 11 MMOL/L (ref 7–15)
ANION GAP SERPL CALCULATED.3IONS-SCNC: 13 MMOL/L (ref 7–15)
ANION GAP SERPL CALCULATED.3IONS-SCNC: 15 MMOL/L (ref 7–15)
ANION GAP SERPL CALCULATED.3IONS-SCNC: 21 MMOL/L (ref 7–15)
AST SERPL W P-5'-P-CCNC: 25 U/L (ref 10–50)
BACTERIA BLD CULT: NO GROWTH
BASOPHILS # BLD AUTO: 0.1 10E3/UL (ref 0–0.2)
BASOPHILS NFR BLD AUTO: 1 %
BILIRUB SERPL-MCNC: 0.4 MG/DL
BUN SERPL-MCNC: 15.6 MG/DL (ref 8–23)
BUN SERPL-MCNC: 17.5 MG/DL (ref 8–23)
BUN SERPL-MCNC: 18.8 MG/DL (ref 8–23)
BUN SERPL-MCNC: 20.1 MG/DL (ref 8–23)
CALCIUM SERPL-MCNC: 9.1 MG/DL (ref 8.2–9.6)
CALCIUM SERPL-MCNC: 9.4 MG/DL (ref 8.2–9.6)
CALCIUM SERPL-MCNC: 9.6 MG/DL (ref 8.2–9.6)
CALCIUM SERPL-MCNC: 9.7 MG/DL (ref 8.8–10.2)
CHLORIDE SERPL-SCNC: 100 MMOL/L (ref 98–107)
CHLORIDE SERPL-SCNC: 101 MMOL/L (ref 98–107)
CHLORIDE SERPL-SCNC: 99 MMOL/L (ref 98–107)
CHLORIDE SERPL-SCNC: 99 MMOL/L (ref 98–107)
CHOLEST SERPL-MCNC: 200 MG/DL
CREAT SERPL-MCNC: 1.18 MG/DL (ref 0.67–1.17)
CREAT SERPL-MCNC: 1.28 MG/DL (ref 0.67–1.17)
CREAT SERPL-MCNC: 1.34 MG/DL (ref 0.67–1.17)
CREAT SERPL-MCNC: 1.38 MG/DL (ref 0.67–1.17)
DEPRECATED HCO3 PLAS-SCNC: 19 MMOL/L (ref 22–29)
DEPRECATED HCO3 PLAS-SCNC: 21 MMOL/L (ref 22–29)
DEPRECATED HCO3 PLAS-SCNC: 25 MMOL/L (ref 22–29)
DEPRECATED HCO3 PLAS-SCNC: 30 MMOL/L (ref 22–29)
EOSINOPHIL # BLD AUTO: 0.2 10E3/UL (ref 0–0.7)
EOSINOPHIL NFR BLD AUTO: 2 %
ERYTHROCYTE [DISTWIDTH] IN BLOOD BY AUTOMATED COUNT: 13.3 % (ref 10–15)
ERYTHROCYTE [DISTWIDTH] IN BLOOD BY AUTOMATED COUNT: 13.3 % (ref 10–15)
ERYTHROCYTE [DISTWIDTH] IN BLOOD BY AUTOMATED COUNT: 13.5 % (ref 10–15)
EST. AVERAGE GLUCOSE BLD GHB EST-MCNC: 174 MG/DL
FLUAV RNA SPEC QL NAA+PROBE: NEGATIVE
FLUBV RNA RESP QL NAA+PROBE: NEGATIVE
GFR SERPL CREATININE-BSD FRML MDRD: 49 ML/MIN/1.73M2
GFR SERPL CREATININE-BSD FRML MDRD: 50 ML/MIN/1.73M2
GFR SERPL CREATININE-BSD FRML MDRD: 53 ML/MIN/1.73M2
GFR SERPL CREATININE-BSD FRML MDRD: 59 ML/MIN/1.73M2
GLUCOSE SERPL-MCNC: 120 MG/DL (ref 70–99)
GLUCOSE SERPL-MCNC: 160 MG/DL (ref 70–99)
GLUCOSE SERPL-MCNC: 172 MG/DL (ref 70–99)
GLUCOSE SERPL-MCNC: 174 MG/DL (ref 70–99)
HBA1C MFR BLD: 7.7 %
HCT VFR BLD AUTO: 36.1 % (ref 40–53)
HCT VFR BLD AUTO: 37.2 % (ref 40–53)
HCT VFR BLD AUTO: 40.3 % (ref 40–53)
HDLC SERPL-MCNC: 37 MG/DL
HGB BLD-MCNC: 12.1 G/DL (ref 13.3–17.7)
HGB BLD-MCNC: 12.5 G/DL (ref 13.3–17.7)
HGB BLD-MCNC: 13.6 G/DL (ref 13.3–17.7)
HOLD SPECIMEN: NORMAL
IMM GRANULOCYTES # BLD: 0.1 10E3/UL
IMM GRANULOCYTES NFR BLD: 1 %
IRON BINDING CAPACITY (ROCHE): 231 UG/DL (ref 240–430)
IRON BINDING CAPACITY (ROCHE): 255 UG/DL (ref 240–430)
IRON SATN MFR SERPL: 15 % (ref 15–46)
IRON SATN MFR SERPL: 25 % (ref 15–46)
IRON SERPL-MCNC: 35 UG/DL (ref 61–157)
IRON SERPL-MCNC: 63 UG/DL (ref 61–157)
L PNEUMO1 AG UR QL IA: NEGATIVE
LACTATE SERPL-SCNC: 1.8 MMOL/L (ref 0.7–2)
LDLC SERPL CALC-MCNC: 126 MG/DL
LVEF ECHO: NORMAL
LYMPHOCYTES # BLD AUTO: 1.8 10E3/UL (ref 0.8–5.3)
LYMPHOCYTES NFR BLD AUTO: 16 %
M PNEUMO IGM SER IA-ACNC: 0.05 U/L
MCH RBC QN AUTO: 31.3 PG (ref 26.5–33)
MCH RBC QN AUTO: 31.8 PG (ref 26.5–33)
MCH RBC QN AUTO: 32.1 PG (ref 26.5–33)
MCHC RBC AUTO-ENTMCNC: 33.5 G/DL (ref 31.5–36.5)
MCHC RBC AUTO-ENTMCNC: 33.6 G/DL (ref 31.5–36.5)
MCHC RBC AUTO-ENTMCNC: 33.7 G/DL (ref 31.5–36.5)
MCV RBC AUTO: 93 FL (ref 78–100)
MCV RBC AUTO: 95 FL (ref 78–100)
MCV RBC AUTO: 96 FL (ref 78–100)
MONOCYTES # BLD AUTO: 0.9 10E3/UL (ref 0–1.3)
MONOCYTES NFR BLD AUTO: 7 %
NEUTROPHILS # BLD AUTO: 8.6 10E3/UL (ref 1.6–8.3)
NEUTROPHILS NFR BLD AUTO: 73 %
NONHDLC SERPL-MCNC: 163 MG/DL
NRBC # BLD AUTO: 0 10E3/UL
NRBC BLD AUTO-RTO: 0 /100
NT-PROBNP SERPL-MCNC: 6136 PG/ML (ref 0–1800)
PLATELET # BLD AUTO: 140 10E3/UL (ref 150–450)
PLATELET # BLD AUTO: 145 10E3/UL (ref 150–450)
PLATELET # BLD AUTO: 206 10E3/UL (ref 150–450)
POTASSIUM SERPL-SCNC: 3.4 MMOL/L (ref 3.4–5.3)
POTASSIUM SERPL-SCNC: 3.5 MMOL/L (ref 3.4–5.3)
POTASSIUM SERPL-SCNC: 3.8 MMOL/L (ref 3.4–5.3)
POTASSIUM SERPL-SCNC: 3.9 MMOL/L (ref 3.4–5.3)
PROT SERPL-MCNC: 7.1 G/DL (ref 6.4–8.3)
RBC # BLD AUTO: 3.8 10E6/UL (ref 4.4–5.9)
RBC # BLD AUTO: 3.89 10E6/UL (ref 4.4–5.9)
RBC # BLD AUTO: 4.35 10E6/UL (ref 4.4–5.9)
RSV RNA SPEC NAA+PROBE: NEGATIVE
S PNEUM AG SPEC QL: NEGATIVE
SARS-COV-2 RNA RESP QL NAA+PROBE: POSITIVE
SODIUM SERPL-SCNC: 136 MMOL/L (ref 136–145)
SODIUM SERPL-SCNC: 139 MMOL/L (ref 136–145)
SODIUM SERPL-SCNC: 139 MMOL/L (ref 136–145)
SODIUM SERPL-SCNC: 140 MMOL/L (ref 136–145)
TRIGL SERPL-MCNC: 183 MG/DL
TROPONIN T SERPL HS-MCNC: 57 NG/L
TROPONIN T SERPL HS-MCNC: 66 NG/L
TSH SERPL DL<=0.005 MIU/L-ACNC: 0.78 UIU/ML (ref 0.3–4.2)
TSH SERPL DL<=0.005 MIU/L-ACNC: 0.85 UIU/ML (ref 0.3–4.2)
WBC # BLD AUTO: 11.6 10E3/UL (ref 4–11)
WBC # BLD AUTO: 7.5 10E3/UL (ref 4–11)
WBC # BLD AUTO: 8.4 10E3/UL (ref 4–11)

## 2023-01-01 PROCEDURE — G0463 HOSPITAL OUTPT CLINIC VISIT: HCPCS | Mod: 25

## 2023-01-01 PROCEDURE — G0463 HOSPITAL OUTPT CLINIC VISIT: HCPCS

## 2023-01-01 PROCEDURE — 87899 AGENT NOS ASSAY W/OPTIC: CPT | Mod: 91 | Performed by: HOSPITALIST

## 2023-01-01 PROCEDURE — 85027 COMPLETE CBC AUTOMATED: CPT | Performed by: HOSPITALIST

## 2023-01-01 PROCEDURE — 250N000013 HC RX MED GY IP 250 OP 250 PS 637: Performed by: HOSPITALIST

## 2023-01-01 PROCEDURE — 84484 ASSAY OF TROPONIN QUANT: CPT | Performed by: STUDENT IN AN ORGANIZED HEALTH CARE EDUCATION/TRAINING PROGRAM

## 2023-01-01 PROCEDURE — 80061 LIPID PANEL: CPT | Mod: ZL | Performed by: FAMILY MEDICINE

## 2023-01-01 PROCEDURE — 93010 ELECTROCARDIOGRAM REPORT: CPT | Performed by: INTERNAL MEDICINE

## 2023-01-01 PROCEDURE — 82435 ASSAY OF BLOOD CHLORIDE: CPT | Mod: ZL | Performed by: FAMILY MEDICINE

## 2023-01-01 PROCEDURE — 99214 OFFICE O/P EST MOD 30 MIN: CPT | Performed by: FAMILY MEDICINE

## 2023-01-01 PROCEDURE — 36415 COLL VENOUS BLD VENIPUNCTURE: CPT | Performed by: HOSPITALIST

## 2023-01-01 PROCEDURE — 87040 BLOOD CULTURE FOR BACTERIA: CPT | Performed by: STUDENT IN AN ORGANIZED HEALTH CARE EDUCATION/TRAINING PROGRAM

## 2023-01-01 PROCEDURE — 99239 HOSP IP/OBS DSCHRG MGMT >30: CPT | Performed by: INTERNAL MEDICINE

## 2023-01-01 PROCEDURE — 83550 IRON BINDING TEST: CPT | Mod: ZL | Performed by: FAMILY MEDICINE

## 2023-01-01 PROCEDURE — G0378 HOSPITAL OBSERVATION PER HR: HCPCS

## 2023-01-01 PROCEDURE — 80048 BASIC METABOLIC PNL TOTAL CA: CPT | Performed by: STUDENT IN AN ORGANIZED HEALTH CARE EDUCATION/TRAINING PROGRAM

## 2023-01-01 PROCEDURE — 83036 HEMOGLOBIN GLYCOSYLATED A1C: CPT | Mod: ZL | Performed by: FAMILY MEDICINE

## 2023-01-01 PROCEDURE — 36415 COLL VENOUS BLD VENIPUNCTURE: CPT | Mod: ZL | Performed by: FAMILY MEDICINE

## 2023-01-01 PROCEDURE — 87899 AGENT NOS ASSAY W/OPTIC: CPT | Performed by: HOSPITALIST

## 2023-01-01 PROCEDURE — 93306 TTE W/DOPPLER COMPLETE: CPT | Mod: 26 | Performed by: INTERNAL MEDICINE

## 2023-01-01 PROCEDURE — 87637 SARSCOV2&INF A&B&RSV AMP PRB: CPT | Performed by: STUDENT IN AN ORGANIZED HEALTH CARE EDUCATION/TRAINING PROGRAM

## 2023-01-01 PROCEDURE — 93005 ELECTROCARDIOGRAM TRACING: CPT | Performed by: STUDENT IN AN ORGANIZED HEALTH CARE EDUCATION/TRAINING PROGRAM

## 2023-01-01 PROCEDURE — 99285 EMERGENCY DEPT VISIT HI MDM: CPT | Mod: 25 | Performed by: STUDENT IN AN ORGANIZED HEALTH CARE EDUCATION/TRAINING PROGRAM

## 2023-01-01 PROCEDURE — 80048 BASIC METABOLIC PNL TOTAL CA: CPT | Performed by: HOSPITALIST

## 2023-01-01 PROCEDURE — 84484 ASSAY OF TROPONIN QUANT: CPT | Performed by: HOSPITALIST

## 2023-01-01 PROCEDURE — 82374 ASSAY BLOOD CARBON DIOXIDE: CPT | Mod: ZL | Performed by: FAMILY MEDICINE

## 2023-01-01 PROCEDURE — 36415 COLL VENOUS BLD VENIPUNCTURE: CPT | Performed by: STUDENT IN AN ORGANIZED HEALTH CARE EDUCATION/TRAINING PROGRAM

## 2023-01-01 PROCEDURE — 84443 ASSAY THYROID STIM HORMONE: CPT | Mod: ZL | Performed by: FAMILY MEDICINE

## 2023-01-01 PROCEDURE — G0463 HOSPITAL OUTPT CLINIC VISIT: HCPCS | Performed by: FAMILY MEDICINE

## 2023-01-01 PROCEDURE — 83880 ASSAY OF NATRIURETIC PEPTIDE: CPT | Performed by: STUDENT IN AN ORGANIZED HEALTH CARE EDUCATION/TRAINING PROGRAM

## 2023-01-01 PROCEDURE — 99284 EMERGENCY DEPT VISIT MOD MDM: CPT | Performed by: STUDENT IN AN ORGANIZED HEALTH CARE EDUCATION/TRAINING PROGRAM

## 2023-01-01 PROCEDURE — 83605 ASSAY OF LACTIC ACID: CPT | Performed by: STUDENT IN AN ORGANIZED HEALTH CARE EDUCATION/TRAINING PROGRAM

## 2023-01-01 PROCEDURE — 86738 MYCOPLASMA ANTIBODY: CPT | Performed by: HOSPITALIST

## 2023-01-01 PROCEDURE — 84443 ASSAY THYROID STIM HORMONE: CPT | Performed by: HOSPITALIST

## 2023-01-01 PROCEDURE — 80053 COMPREHEN METABOLIC PANEL: CPT | Mod: ZL | Performed by: FAMILY MEDICINE

## 2023-01-01 PROCEDURE — 71046 X-RAY EXAM CHEST 2 VIEWS: CPT

## 2023-01-01 PROCEDURE — 85025 COMPLETE CBC W/AUTO DIFF WBC: CPT | Mod: ZL | Performed by: FAMILY MEDICINE

## 2023-01-01 PROCEDURE — C9803 HOPD COVID-19 SPEC COLLECT: HCPCS | Performed by: STUDENT IN AN ORGANIZED HEALTH CARE EDUCATION/TRAINING PROGRAM

## 2023-01-01 PROCEDURE — 255N000002 HC RX 255 OP 636: Performed by: INTERNAL MEDICINE

## 2023-01-01 PROCEDURE — 99232 SBSQ HOSP IP/OBS MODERATE 35: CPT | Performed by: INTERNAL MEDICINE

## 2023-01-01 PROCEDURE — 999N000208 ECHOCARDIOGRAM COMPLETE

## 2023-01-01 PROCEDURE — 71046 X-RAY EXAM CHEST 2 VIEWS: CPT | Mod: TC

## 2023-01-01 PROCEDURE — 85027 COMPLETE CBC AUTOMATED: CPT | Performed by: STUDENT IN AN ORGANIZED HEALTH CARE EDUCATION/TRAINING PROGRAM

## 2023-01-01 PROCEDURE — 82947 ASSAY GLUCOSE BLOOD QUANT: CPT | Mod: ZL | Performed by: FAMILY MEDICINE

## 2023-01-01 PROCEDURE — 97161 PT EVAL LOW COMPLEX 20 MIN: CPT | Mod: GP

## 2023-01-01 PROCEDURE — 99223 1ST HOSP IP/OBS HIGH 75: CPT | Mod: AI | Performed by: HOSPITALIST

## 2023-01-01 RX ORDER — FUROSEMIDE 20 MG
TABLET ORAL
Qty: 90 TABLET | Refills: 0 | Status: SHIPPED | OUTPATIENT
Start: 2023-01-01 | End: 2023-01-01

## 2023-01-01 RX ORDER — GABAPENTIN 300 MG/1
600 CAPSULE ORAL 3 TIMES DAILY
Status: DISCONTINUED | OUTPATIENT
Start: 2023-01-01 | End: 2023-01-01 | Stop reason: HOSPADM

## 2023-01-01 RX ORDER — GABAPENTIN 300 MG/1
600 CAPSULE ORAL 3 TIMES DAILY
Qty: 540 CAPSULE | Refills: 0 | Status: SHIPPED | OUTPATIENT
Start: 2023-01-01

## 2023-01-01 RX ORDER — LEVOTHYROXINE SODIUM 75 UG/1
TABLET ORAL
Qty: 60 TABLET | Refills: 0 | Status: SHIPPED | OUTPATIENT
Start: 2023-01-01 | End: 2023-01-01

## 2023-01-01 RX ORDER — GUAIFENESIN 200 MG/10ML
200 LIQUID ORAL EVERY 4 HOURS PRN
Status: DISCONTINUED | OUTPATIENT
Start: 2023-01-01 | End: 2023-01-01 | Stop reason: HOSPADM

## 2023-01-01 RX ORDER — OXYCODONE HYDROCHLORIDE 10 MG/1
10 TABLET ORAL 2 TIMES DAILY
Qty: 60 TABLET | Refills: 0 | Status: SHIPPED | OUTPATIENT
Start: 2023-01-01 | End: 2023-01-01

## 2023-01-01 RX ORDER — LEVOTHYROXINE SODIUM 75 UG/1
TABLET ORAL
Qty: 90 TABLET | Refills: 2 | Status: SHIPPED | OUTPATIENT
Start: 2023-01-01

## 2023-01-01 RX ORDER — GABAPENTIN 300 MG/1
600 CAPSULE ORAL 3 TIMES DAILY
Qty: 540 CAPSULE | Refills: 0 | Status: SHIPPED | OUTPATIENT
Start: 2023-01-01 | End: 2023-01-01

## 2023-01-01 RX ORDER — ONDANSETRON 4 MG/1
4 TABLET, ORALLY DISINTEGRATING ORAL EVERY 6 HOURS PRN
Status: DISCONTINUED | OUTPATIENT
Start: 2023-01-01 | End: 2023-01-01 | Stop reason: HOSPADM

## 2023-01-01 RX ORDER — FUROSEMIDE 20 MG
20 TABLET ORAL DAILY
Status: DISCONTINUED | OUTPATIENT
Start: 2023-01-01 | End: 2023-01-01 | Stop reason: HOSPADM

## 2023-01-01 RX ORDER — PANTOPRAZOLE SODIUM 40 MG/1
40 TABLET, DELAYED RELEASE ORAL DAILY
Status: DISCONTINUED | OUTPATIENT
Start: 2023-01-01 | End: 2023-01-01 | Stop reason: HOSPADM

## 2023-01-01 RX ORDER — OMEPRAZOLE 40 MG/1
CAPSULE, DELAYED RELEASE ORAL
Qty: 90 CAPSULE | Refills: 2 | Status: SHIPPED | OUTPATIENT
Start: 2023-01-01

## 2023-01-01 RX ORDER — ONDANSETRON 2 MG/ML
4 INJECTION INTRAMUSCULAR; INTRAVENOUS EVERY 6 HOURS PRN
Status: DISCONTINUED | OUTPATIENT
Start: 2023-01-01 | End: 2023-01-01 | Stop reason: HOSPADM

## 2023-01-01 RX ORDER — FERROUS SULFATE 325(65) MG
325 TABLET ORAL 2 TIMES DAILY
Status: DISCONTINUED | OUTPATIENT
Start: 2023-01-01 | End: 2023-01-01 | Stop reason: HOSPADM

## 2023-01-01 RX ORDER — LEVOTHYROXINE SODIUM 75 UG/1
75 TABLET ORAL
Status: DISCONTINUED | OUTPATIENT
Start: 2023-01-01 | End: 2023-01-01 | Stop reason: HOSPADM

## 2023-01-01 RX ORDER — AMLODIPINE BESYLATE 5 MG/1
TABLET ORAL
Qty: 90 TABLET | Refills: 0 | Status: SHIPPED | OUTPATIENT
Start: 2023-01-01 | End: 2023-01-01

## 2023-01-01 RX ORDER — NITROGLYCERIN 0.4 MG/1
TABLET SUBLINGUAL
Qty: 30 TABLET | Refills: 0 | Status: SHIPPED | OUTPATIENT
Start: 2023-01-01

## 2023-01-01 RX ORDER — FERROUS SULFATE 325(65) MG
325 TABLET ORAL 2 TIMES DAILY
COMMUNITY
Start: 2023-01-01

## 2023-01-01 RX ORDER — FUROSEMIDE 20 MG
TABLET ORAL
Qty: 90 TABLET | Refills: 0 | Status: SHIPPED | OUTPATIENT
Start: 2023-01-01

## 2023-01-01 RX ORDER — OXYCODONE HCL 5 MG/5 ML
5 SOLUTION, ORAL ORAL EVERY 4 HOURS PRN
Qty: 45 ML | Refills: 0 | Status: SHIPPED | OUTPATIENT
Start: 2023-01-01 | End: 2023-01-01

## 2023-01-01 RX ORDER — AMLODIPINE BESYLATE 5 MG/1
5 TABLET ORAL DAILY
Status: DISCONTINUED | OUTPATIENT
Start: 2023-01-01 | End: 2023-01-01 | Stop reason: HOSPADM

## 2023-01-01 RX ORDER — GABAPENTIN 300 MG/1
600 CAPSULE ORAL 3 TIMES DAILY
Qty: 180 CAPSULE | Refills: 4 | COMMUNITY
Start: 2023-01-01 | End: 2023-01-01

## 2023-01-01 RX ORDER — AMLODIPINE BESYLATE 5 MG/1
TABLET ORAL
Qty: 90 TABLET | Refills: 0 | Status: SHIPPED | OUTPATIENT
Start: 2023-01-01

## 2023-01-01 RX ADMIN — GABAPENTIN 600 MG: 300 CAPSULE ORAL at 20:48

## 2023-01-01 RX ADMIN — LEVOTHYROXINE SODIUM 75 MCG: 75 TABLET ORAL at 09:09

## 2023-01-01 RX ADMIN — FERROUS SULFATE TAB 325 MG (65 MG ELEMENTAL FE) 325 MG: 325 (65 FE) TAB at 09:09

## 2023-01-01 RX ADMIN — GABAPENTIN 600 MG: 300 CAPSULE ORAL at 15:08

## 2023-01-01 RX ADMIN — Medication 1000 MCG: at 08:49

## 2023-01-01 RX ADMIN — Medication 1000 MCG: at 09:09

## 2023-01-01 RX ADMIN — PANTOPRAZOLE SODIUM 40 MG: 40 TABLET, DELAYED RELEASE ORAL at 08:48

## 2023-01-01 RX ADMIN — GABAPENTIN 600 MG: 300 CAPSULE ORAL at 08:47

## 2023-01-01 RX ADMIN — FERROUS SULFATE TAB 325 MG (65 MG ELEMENTAL FE) 325 MG: 325 (65 FE) TAB at 08:49

## 2023-01-01 RX ADMIN — GABAPENTIN 600 MG: 300 CAPSULE ORAL at 20:54

## 2023-01-01 RX ADMIN — FERROUS SULFATE TAB 325 MG (65 MG ELEMENTAL FE) 325 MG: 325 (65 FE) TAB at 20:54

## 2023-01-01 RX ADMIN — FERROUS SULFATE TAB 325 MG (65 MG ELEMENTAL FE) 325 MG: 325 (65 FE) TAB at 20:48

## 2023-01-01 RX ADMIN — Medication 125 MCG: at 08:50

## 2023-01-01 RX ADMIN — GABAPENTIN 600 MG: 300 CAPSULE ORAL at 09:09

## 2023-01-01 RX ADMIN — AMLODIPINE BESYLATE 5 MG: 5 TABLET ORAL at 08:47

## 2023-01-01 RX ADMIN — PANTOPRAZOLE SODIUM 40 MG: 40 TABLET, DELAYED RELEASE ORAL at 09:09

## 2023-01-01 RX ADMIN — Medication 125 MCG: at 09:12

## 2023-01-01 RX ADMIN — PERFLUTREN 3 ML: 6.52 INJECTION, SUSPENSION INTRAVENOUS at 11:36

## 2023-01-01 RX ADMIN — FUROSEMIDE 20 MG: 20 TABLET ORAL at 09:09

## 2023-01-01 RX ADMIN — AMLODIPINE BESYLATE 5 MG: 5 TABLET ORAL at 09:09

## 2023-01-01 RX ADMIN — FUROSEMIDE 20 MG: 20 TABLET ORAL at 08:49

## 2023-01-01 RX ADMIN — GABAPENTIN 600 MG: 300 CAPSULE ORAL at 13:45

## 2023-01-01 RX ADMIN — LEVOTHYROXINE SODIUM 75 MCG: 75 TABLET ORAL at 06:46

## 2023-01-01 ASSESSMENT — ACTIVITIES OF DAILY LIVING (ADL)
ADLS_ACUITY_SCORE: 33
ADLS_ACUITY_SCORE: 32
ADLS_ACUITY_SCORE: 36
ADLS_ACUITY_SCORE: 33
ADLS_ACUITY_SCORE: 32
ADLS_ACUITY_SCORE: 35
ADLS_ACUITY_SCORE: 31
ADLS_ACUITY_SCORE: 32
ADLS_ACUITY_SCORE: 32
ADLS_ACUITY_SCORE: 35
ADLS_ACUITY_SCORE: 32
ADLS_ACUITY_SCORE: 33
ADLS_ACUITY_SCORE: 36
ADLS_ACUITY_SCORE: 32
ADLS_ACUITY_SCORE: 32
ADLS_ACUITY_SCORE: 36
ADLS_ACUITY_SCORE: 33
ADLS_ACUITY_SCORE: 32
PREVIOUS_RESPONSIBILITIES: MEAL PREP
ADLS_ACUITY_SCORE: 32
ADLS_ACUITY_SCORE: 36
ADLS_ACUITY_SCORE: 31
ADLS_ACUITY_SCORE: 36
DEPENDENT_IADLS:: CLEANING;COOKING;LAUNDRY;SHOPPING;MEAL PREPARATION;TRANSPORTATION
ADLS_ACUITY_SCORE: 33

## 2023-01-01 ASSESSMENT — ANXIETY QUESTIONNAIRES
3. WORRYING TOO MUCH ABOUT DIFFERENT THINGS: NOT AT ALL
6. BECOMING EASILY ANNOYED OR IRRITABLE: NOT AT ALL
7. FEELING AFRAID AS IF SOMETHING AWFUL MIGHT HAPPEN: NOT AT ALL
4. TROUBLE RELAXING: NOT AT ALL
2. NOT BEING ABLE TO STOP OR CONTROL WORRYING: NOT AT ALL
7. FEELING AFRAID AS IF SOMETHING AWFUL MIGHT HAPPEN: NOT AT ALL
7. FEELING AFRAID AS IF SOMETHING AWFUL MIGHT HAPPEN: NOT AT ALL
IF YOU CHECKED OFF ANY PROBLEMS ON THIS QUESTIONNAIRE, HOW DIFFICULT HAVE THESE PROBLEMS MADE IT FOR YOU TO DO YOUR WORK, TAKE CARE OF THINGS AT HOME, OR GET ALONG WITH OTHER PEOPLE: NOT DIFFICULT AT ALL
2. NOT BEING ABLE TO STOP OR CONTROL WORRYING: NOT AT ALL
3. WORRYING TOO MUCH ABOUT DIFFERENT THINGS: NOT AT ALL
GAD7 TOTAL SCORE: 0
8. IF YOU CHECKED OFF ANY PROBLEMS, HOW DIFFICULT HAVE THESE MADE IT FOR YOU TO DO YOUR WORK, TAKE CARE OF THINGS AT HOME, OR GET ALONG WITH OTHER PEOPLE?: NOT DIFFICULT AT ALL
6. BECOMING EASILY ANNOYED OR IRRITABLE: NOT AT ALL
5. BEING SO RESTLESS THAT IT IS HARD TO SIT STILL: NOT AT ALL
GAD7 TOTAL SCORE: 0
5. BEING SO RESTLESS THAT IT IS HARD TO SIT STILL: NOT AT ALL
4. TROUBLE RELAXING: NOT AT ALL
1. FEELING NERVOUS, ANXIOUS, OR ON EDGE: NOT AT ALL
1. FEELING NERVOUS, ANXIOUS, OR ON EDGE: NOT AT ALL

## 2023-01-01 ASSESSMENT — PATIENT HEALTH QUESTIONNAIRE - PHQ9
SUM OF ALL RESPONSES TO PHQ QUESTIONS 1-9: 2
SUM OF ALL RESPONSES TO PHQ QUESTIONS 1-9: 0
10. IF YOU CHECKED OFF ANY PROBLEMS, HOW DIFFICULT HAVE THESE PROBLEMS MADE IT FOR YOU TO DO YOUR WORK, TAKE CARE OF THINGS AT HOME, OR GET ALONG WITH OTHER PEOPLE: NOT DIFFICULT AT ALL
SUM OF ALL RESPONSES TO PHQ QUESTIONS 1-9: 2

## 2023-01-01 ASSESSMENT — PAIN SCALES - GENERAL
PAINLEVEL: NO PAIN (0)

## 2023-01-25 PROBLEM — F11.90 CHRONIC, CONTINUOUS USE OF OPIOIDS: Chronic | Status: ACTIVE | Noted: 2021-05-17

## 2023-01-31 NOTE — TELEPHONE ENCOUNTER
Oxycodone      Last Written Prescription Date:  12.14.22  Last Fill Quantity: #60,   # refills: 0  Last Office Visit: 12.7.22  Future Office visit:    Next 5 appointments (look out 90 days)    Mar 08, 2023  2:30 PM  (Arrive by 2:15 PM)  SHORT with Shabnam Burgos MD  [facility] ([facility] [place] ) [address]  [place] [address]  [phone]           Routing refill request to provider for review/approval because:  Drug not on the [facility], [facility] or [facility] refill protocol or controlled substance

## 2023-02-23 NOTE — TELEPHONE ENCOUNTER
Prilosec       Last Written Prescription Date:  5/18/22  Last Fill Quantity: 90,   # refills: 2  Last Office Visit: 12/7/22  Future Office visit:    Next 5 appointments (look out 90 days)    Mar 08, 2023  2:30 PM  (Arrive by 2:15 PM)  SHORT with Shabnam Burgos MD  [facility] [place] ([facility] [place] ) [address]  [place] [address]  [phone]

## 2023-03-08 NOTE — PROGRESS NOTES
Assessment & Plan     Spinal stenosis, unspecified spinal region  Continue gabapentin, may stop the oxycodone.   - gabapentin (NEURONTIN) 300 MG capsule  Dispense: 180 capsule; Refill: 4    Restless Legs Syndrome / Anemia in stage 3 chronic kidney disease, unspecified whether stage 3a or 3b CKD (H)  Recheck iron, may be related to decrease in opiates. Consider increase in gabapentin in the evening to help with sx if needed.   - Iron and iron binding capacity  - CBC with platelets and differential  - Iron and iron binding capacity  - CBC with platelets and differential    Type 2 diabetes mellitus without complication, without long-term current use of insulin (H)  Diet controlled, advised no food restriction for him. He may eat for pleasure. Needs prompting, more supportive env may be helpful to avoid further weight loss, however, pt is resistant.   - Comprehensive metabolic panel (BMP + Alb, Alk Phos, ALT, AST, Total. Bili, TP)  - Comprehensive metabolic panel (BMP + Alb, Alk Phos, ALT, AST, Total. Bili, TP)    Benign essential hypertension / Stage 3 chronic kidney disease, unspecified whether stage 3a or 3b CKD (H)  Controlled, stable    Moderate dementia without behavioral disturbance, psychotic disturbance, mood disturbance, or anxiety, unspecified dementia type  Daughter continues to support patient at home. He is doing fairly well at home, however, requiring more prompting and assist with ADLs. Pt was not willing to engage in discussion regarding planning for higher level of care. Daughter is familiar with facilities in the area and would consider these, if pt was more willing. Will continue to support daughter in planning ahead.     30 minutes spent on the date of the encounter doing chart review, review of test results, interpretation of tests, patient visit, documentation and discussion with family     Return in about 3 months (around 6/8/2023) for CDM.    Shabnam Burgos MD  [facility]  [place]   Graham Mills is a 89 year old accompanied by his daughter, presenting for the following health issues:  Diabetes, Lipids, Hypertension, Thyroid Problem, and Musculoskeletal Problem      HPI     Diabetes Follow-up      How often are you checking your blood sugar? Not at all    What concerns do you have today about your diabetes? None     Do you have any of these symptoms? (Select all that apply)  No numbness or tingling in feet.  No redness, sores or blisters on feet.  No complaints of excessive thirst.  No reports of blurry vision.  No significant changes to weight.    Have you had a diabetic eye exam in the last 12 months? No    Hyperlipidemia Follow-Up      Are you regularly taking any medication or supplement to lower your cholesterol?   No    Are you having muscle aches or other side effects that you think could be caused by your cholesterol lowering medication?  No    Hypertension Follow-up      Do you check your blood pressure regularly outside of the clinic? No     Are you following a low salt diet? No    Are your blood pressures ever more than 140 on the top number (systolic) OR more   than 90 on the bottom number (diastolic), for example 140/90? Yes    BP Readings from Last 2 Encounters:   03/08/23 138/66   12/07/22 136/64     Hemoglobin A1C (%)   Date Value   12/07/2022 6.6 (H)   04/01/2022 6.1 (H)   10/27/2020 6.4 (H)   11/14/2017 6.0     LDL Cholesterol Calculated (mg/dL)   Date Value   07/30/2021 127 (H)   11/14/2017 107 (H)   09/20/2016 84       Hypothyroidism Follow-up      Since last visit, patient describes the following symptoms: Weight stable, no hair loss, no skin changes, no constipation, no loose stools    Chronic/Recurring Back Pain Follow Up      Where is your back pain located? (Select all that apply) low back bilateral    How would you describe your back pain?  Pain not an issue    Where does your back pain spread? Nowhere    Since your last clinic visit for back  "pain, how has your pain changed? no longer a problem    Does your back pain interfere with your job? Not applicable    Since your last visit, have you tried any new treatment? No    Has tolerated decrease in opiates without much difficulty. Off methadone and down to only half oxycodone at night. No increase in pain, however, daughter notes more fidgeting, moving legs, etc. Has mentioned to her his legs feel strange at night.     Daughter remains sole caregiver. Pt will forget to eat if not prompted. She is calling and popping over daily to ensure medications are taken and he is eating. He is requiring prompting and persistence with showering. He denies this, however, daughter states he is very resistent to showering. Does need assist in the shower. Does have helper a couple hours a week as well. Pt is not receptive to discussing transition to penitentiary or more supportive environment.     Review of Systems   Constitutional, HEENT, cardiovascular, pulmonary, gi and gu systems are negative, except as otherwise noted.      Objective    /66 (BP Location: Left arm, Patient Position: Sitting, Cuff Size: Adult Regular)   Pulse 78   Temp 98.4  F (36.9  C) (Tympanic)   Resp 16   Ht 1.588 m (5' 2.5\")   Wt 78.9 kg (174 lb)   SpO2 96%   BMI 31.32 kg/m    Body mass index is 31.32 kg/m .  Physical Exam   GENERAL: alert and no distress  NECK: no adenopathy, no asymmetry, masses, or scars and thyroid normal to palpation  RESP: lungs clear to auscultation - no rales, rhonchi or wheezes  CV: regular rates and rhythm, normal S1 S2, no S3 or S4, no murmur, click or rub and no peripheral edema  SKIN: no suspicious lesions or rashes  NEURO: mentation intact, speech normal and gait slow, unsteady, using cane and daughter for support. Using walker at home  PSYCH: inattentive, affect flat, judgement and insight impaired and appearance well groomed    Results for orders placed or performed in visit on 03/08/23   Comprehensive metabolic " panel (BMP + Alb, Alk Phos, ALT, AST, Total. Bili, TP)     Status: Abnormal   Result Value Ref Range    Sodium 139 136 - 145 mmol/L    Potassium 3.9 3.4 - 5.3 mmol/L    Chloride 101 98 - 107 mmol/L    Carbon Dioxide (CO2) 25 22 - 29 mmol/L    Anion Gap 13 7 - 15 mmol/L    Urea Nitrogen 15.6 8.0 - 23.0 mg/dL    Creatinine 1.28 (H) 0.67 - 1.17 mg/dL    Calcium 9.7 8.8 - 10.2 mg/dL    Glucose 120 (H) 70 - 99 mg/dL    Alkaline Phosphatase 117 40 - 129 U/L    AST 25 10 - 50 U/L    ALT 14 10 - 50 U/L    Protein Total 7.1 6.4 - 8.3 g/dL    Albumin 4.2 3.5 - 5.2 g/dL    Bilirubin Total 0.4 <=1.2 mg/dL    GFR Estimate 53 (L) >60 mL/min/1.73m2   Iron and iron binding capacity     Status: Abnormal   Result Value Ref Range    Iron 35 (L) 61 - 157 ug/dL    Iron Binding Capacity 231 (L) 240 - 430 ug/dL    Iron Sat Index 15 15 - 46 %   CBC with platelets and differential     Status: Abnormal   Result Value Ref Range    WBC Count 11.6 (H) 4.0 - 11.0 10e3/uL    RBC Count 4.35 (L) 4.40 - 5.90 10e6/uL    Hemoglobin 13.6 13.3 - 17.7 g/dL    Hematocrit 40.3 40.0 - 53.0 %    MCV 93 78 - 100 fL    MCH 31.3 26.5 - 33.0 pg    MCHC 33.7 31.5 - 36.5 g/dL    RDW 13.5 10.0 - 15.0 %    Platelet Count 206 150 - 450 10e3/uL    % Neutrophils 73 %    % Lymphocytes 16 %    % Monocytes 7 %    % Eosinophils 2 %    % Basophils 1 %    % Immature Granulocytes 1 %    NRBCs per 100 WBC 0 <1 /100    Absolute Neutrophils 8.6 (H) 1.6 - 8.3 10e3/uL    Absolute Lymphocytes 1.8 0.8 - 5.3 10e3/uL    Absolute Monocytes 0.9 0.0 - 1.3 10e3/uL    Absolute Eosinophils 0.2 0.0 - 0.7 10e3/uL    Absolute Basophils 0.1 0.0 - 0.2 10e3/uL    Absolute Immature Granulocytes 0.1 <=0.4 10e3/uL    Absolute NRBCs 0.0 10e3/uL   CBC with platelets and differential     Status: Abnormal    Narrative    The following orders were created for panel order CBC with platelets and differential.  Procedure                               Abnormality         Status                     ---------                                -----------         ------                     CBC with platelets and d...[[id number]]  Abnormal            Final result                 Please view results for these tests on the individual orders.

## 2023-03-08 NOTE — LETTER
Opioid / Opioid Plus Controlled Substance Agreement    This is an agreement between you and your provider about the safe and appropriate use of controlled substance/opioids prescribed by your care team. Controlled substances are medicines that can cause physical and mental dependence (abuse).    There are strict laws about having and using these medicines. We here at [facility] are committing to working with you in your efforts to get better. To support you in this work, we ll help you schedule regular office appointments for medicine refills. If we must cancel or change your appointment for any reason, we ll make sure you have enough medicine to last until your next appointment.     As a Provider, I will:    Listen carefully to your concerns and treat you with respect.     Recommend a treatment plan that I believe is in your best interest. This plan may involve therapies other than opioid pain medication.     Talk with you often about the possible benefits, and the risk of harm of any medicine that we prescribe for you.     Provide a plan on how to taper (discontinue or go off) using this medicine if the decision is made to stop its use.    As a Patient, I understand that opioid(s):     Are a controlled substance prescribed by my care team to help me function or work and manage my condition(s).     Are strong medicines and can cause serious side effects such as:    Drowsiness, which can seriously affect my driving ability    A lower breathing rate, enough to cause death    Harm to my thinking ability     Depression     Abuse of and addiction to this medicine    Need to be taken exactly as prescribed. Combining opioids with certain medicines or chemicals (such as illegal drugs, sedatives, sleeping pills, and benzodiazepines) can be dangerous or even fatal. If I stop opioids suddenly, I may have severe withdrawal symptoms.    Do not work for all types of pain nor for all patients. If they re not helpful, I may  be asked to stop them.        The risks, benefits and side effects of these medicine(s) were explained to me. I agree that:  1. I will take part in other treatments as advised by my care team. This may be psychiatry or counseling, physical therapy, behavioral therapy, group treatment or a referral to a specialist.     2. I will keep all my appointments. I understand that this is part of the monitoring of opioids. My care team may require an office visit for EVERY opioid/controlled substance refill. If I miss appointments or don t follow instructions, my care team may stop my medicine.    3. I will take my medicines as prescribed. I will not change the dose or schedule unless my care team tells me to. There will be no refills if I run out early.     4. I may be asked to come to the clinic and complete a urine drug test or complete a pill count at any time. If I don t give a urine sample or participate in a pill count, the care team may stop my medicine.    5. I will only receive prescriptions from this clinic for chronic pain. If I am treated by another provider for acute pain issues, I will tell them that I am taking opioid pain medication for chronic pain and that I have a treatment agreement with this provider. I will inform my [facility] care team within one business day if I am given a prescription for any pain medication by another healthcare provider. My [facility] care team can contact other providers and pharmacists about my use of any medicines.    6. It is up to me to make sure that I don t run out of my medicines on weekends or holidays. If my care team is willing to refill my opioid prescription without a visit, I must request refills only during office hours. Refills may take up to 3 business days to process. I will use one pharmacy to fill all my opioid and other controlled substance prescriptions. I will notify the clinic about any changes to my insurance or medication  availability.    7. I am responsible for my prescriptions. If the medicine/prescription is lost, stolen or destroyed, it will not be replaced. I also agree not to share controlled substance medicines with anyone.    8. I am aware I should not use any illegal or recreational drugs. I agree not to drink alcohol unless my care team says I can.       9. If I enroll in the [place] [facility] program, I will tell my care team prior to my next refill.     10. I will tell my care team right away if I become pregnant, have a new medical problem treated outside of my regular clinic, or have a change in my medications.    11. I understand that this medicine can affect my thinking, judgment and reaction time. Alcohol and drugs affect the brain and body, which can affect the safety of my driving. Being under the influence of alcohol or drugs can affect my decision-making, behaviors, personal safety, and the safety of others. Driving while impaired (DWI) can occur if a person is driving, operating, or in physical control of a car, motorcycle, boat, snowmobile, ATV, motorbike, off-road vehicle, or any other motor vehicle ([place] Statute 169A.20). I understand the risk if I choose to drive or operate any vehicle or machinery.    I understand that if I do not follow any of the conditions above, my prescriptions or treatment may be stopped or changed.          Opioids  What You Need to Know    What are opioids?   Opioids are pain medicines that must be prescribed by a doctor. They are also known as narcotics.     Examples are:   1. morphine (MS Contin, Monica)  2. oxycodone (Oxycontin)  3. oxycodone and acetaminophen (Percocet)  4. hydrocodone and acetaminophen (Vicodin, Norco)   5. fentanyl patch (Duragesic)   6. hydromorphone (Dilaudid)   7. methadone  8. codeine (Tylenol #3)     What do opioids do well?   Opioids are best for severe short-term pain such as after a surgery or injury. They may work well for cancer pain. They may  help some people with long-lasting (chronic) pain.     What do opioids NOT do well?   Opioids never get rid of pain entirely, and they don t work well for most patients with chronic pain. Opioids don t reduce swelling, one of the causes of pain.                                    Other ways to manage chronic pain and improve function include:       Treat the health problem that may be causing pain    Anti-inflammation medicines, which reduce swelling and tenderness, such as ibuprofen (Advil, Motrin) or naproxen (Aleve)    Acetaminophen (Tylenol)    Antidepressants and anti-seizure medicines, especially for nerve pain    Topical treatments such as patches or creams    Injections or nerve blocks    Chiropractic or osteopathic treatment    Acupuncture, massage, deep breathing, meditation, visual imagery, aromatherapy    Use heat or ice at the pain site    Physical therapy     Exercise    Stop smoking    Take part in therapy       Risks and side effects     Talk to your doctor before you start or decide to keep taking opioids. Possible side effects include:      Lowering your breathing rate enough to cause death    Overdose, including death, especially if taking higher than prescribed doses    Worse depression symptoms; less pleasure in things you usually enjoy    Feeling tired or sluggish    Slower thoughts or cloudy thinking    Being more sensitive to pain over time; pain is harder to control    Trouble sleeping or restless sleep    Changes in hormone levels (for example, less testosterone)    Changes in sex drive or ability to have sex    Constipation    Unsafe driving    Itching and sweating    Dizziness    Nausea, throwing up and dry mouth    What else should I know about opioids?    Opioids may lead to dependence, tolerance, or addiction.      Dependence means that if you stop or reduce the medicine too quickly, you will have withdrawal symptoms. These include loose poop (diarrhea), jitters, flu-like symptoms,  nervousness and tremors. Dependence is not the same as addiction.                       Tolerance means needing higher doses over time to get the same effect. This may increase the chance of serious side effects.      Addiction is when people improperly use a substance that harms their body, their mind or their relations with others. Use of opiates can cause a relapse of addiction if you have a history of drug or alcohol abuse.      People who have used opioids for a long time may have a lower quality of life, worse depression, higher levels of pain and more visits to doctors.    You can overdose on opioids. Take these steps to lower your risk of overdose:    1. Recognize the signs:  Signs of overdose include decrease or loss of consciousness (blackout), slowed breathing, trouble waking up and blue lips. If someone is worried about overdose, they should call 911.    2. Talk to your doctor about Narcan (naloxone).   If you are at risk for overdose, you may be given a prescription for Narcan. This medicine very quickly reverses the effects of opioids.   If you overdose, a friend or family member can give you Narcan while waiting for the ambulance. They need to know the signs of overdose and how to give Narcan.     3. Don't use alcohol or street drugs.   Taking them with opioids can cause death.    4. Do not take any of these medicines unless your doctor says it s OK. Taking these with opioids can cause death:    Benzodiazepines, such as lorazepam (Ativan), alprazolam (Xanax) or diazepam (Valium)    Muscle relaxers, such as cyclobenzaprine (Flexeril)    Sleeping pills like zolpidem (Ambien)     Other opioids      How to keep you and other people safe while taking opioids:    1. Never share your opioids with others.  Opioid medicines are regulated by the Drug Enforcement Agency ([employer]DI[place]). Selling or sharing medications is a criminal act.    2. Be sure to store opioids in a secure place, locked up if possible. Young children  can easily swallow them and overdose.    3. When you are traveling with your medicines, keep them in the original bottles. If you use a pill box, be sure you also carry a copy of your medicine list from your clinic or pharmacy.    4. Safe disposal of opioids    Most pharmacies have places to get rid of medicine, called disposal kiosks. Medicine disposal options are also available in every [facility]. Search your county and  medication disposal  to find more options. You can find more details at:  [email or web address]     I agree that my provider, clinic care team, and pharmacy may work with any city, state or federal law enforcement agency that investigates the misuse, sale, or other diversion of my controlled medicine. I will allow my provider to discuss my care with, or share a copy of, this agreement with any other treating provider, pharmacy or emergency room where I receive care.    I have read this agreement and have asked questions about anything I did not understand.    _______________________________________________________  Patient Signature - [place] _____________________                   Date     _______________________________________________________  Provider Signature - Shabnam Burgos MD   _____________________                   Date     _______________________________________________________  Witness Signature (required if provider not present while patient signing)   _____________________                   Date      need to admit [phone]

## 2023-03-13 PROBLEM — F03.B0 MODERATE DEMENTIA WITHOUT BEHAVIORAL DISTURBANCE, PSYCHOTIC DISTURBANCE, MOOD DISTURBANCE, OR ANXIETY, UNSPECIFIED DEMENTIA TYPE (H): Status: ACTIVE | Noted: 2023-01-01

## 2023-03-17 NOTE — TELEPHONE ENCOUNTER
amLODIPine (NORVASC) 5 MG tablet  Last Written Prescription Date:  6-10-22  Last Fill Quantity: 90,   # refills: 3       furosemide (LASIX) 20 MG tablet   Last Written Prescription Date:  9-15-22  Last Fill Quantity: 90,   # refills: 1  Last Office Visit: 3-8-23  Future Office visit:       Routing refill request to provider for review/approval because:

## 2023-04-17 NOTE — TELEPHONE ENCOUNTER
levothyroxine (SYNTHROID/LEVOTHROID) 75 MCG tablet     Last Written Prescription Date:  2-13-23  Last Fill Quantity: 60,   # refills: 0  Last Office Visit: 3-8-23  Future Office visit:    Next 5 appointments (look out 90 days)    Jun 16, 2023  2:30 PM  (Arrive by 2:15 PM)  Office Visit with Shabnam Burgos MD  [facility] [place] ([facility] [place] [facility] [place] ) [address]  [place] [address]  [phone]           Routing refill request to provider for review/approval because:

## 2023-05-24 NOTE — TELEPHONE ENCOUNTER
Gabapentin      Last Written Prescription Date:  1.30.23  Last Fill Quantity: #630,   # refills: 0  Last Office Visit: 3.8.23  Future Office visit:    Next 5 appointments (look out 90 days)    Jun 16, 2023  2:30 PM  (Arrive by 2:15 PM)  Office Visit with Shabnam Burgos MD  [facility] ([facility] ) [address]  [place] [address]  [phone]           Routing refill request to provider for review/approval because:  Drug not on the [facility], [facility] or [facility] refill protocol or controlled substance

## 2023-07-20 PROBLEM — E87.70 HYPERVOLEMIA, UNSPECIFIED HYPERVOLEMIA TYPE: Status: ACTIVE | Noted: 2023-01-01

## 2023-07-20 NOTE — ED PROVIDER NOTES
History     Chief Complaint   Patient presents with     Cough     Fatigue     Generalized Weakness     HPI  Graham Mills is a 89 year old male who is with his daughter.  He has more generalized complaints of weakness and fatigue over the past several months with decreased p.o. intake.  However over the past 3 weeks and more so over the past several days has had an increasing cough and weakness.  Have some chills this morning and daughter thought he felt warm and was sweaty.  He was given ibuprofen around 1 PM today.  He denies any shortness of breath.  He denies any pain.  His primary complaint is feeling weak.  He is able to stand and move independently but feels unstable.  He has no history of CHF but does have a history of CKD.  No history of blood clots or PE.  He denies any chest pain and has no history of ACS or MI.  He has no headache, neck pain.  He is unsure if he had a fever at home.    Allergies:  Allergies   Allergen Reactions     Amoxicillin-Pot Clavulanate Nausea     Influenza Virus Vaccine      Levofloxacin      Levaquin         Problem List:    Patient Active Problem List    Diagnosis Date Noted     Hypervolemia, unspecified hypervolemia type 07/20/2023     Priority: Medium     Moderate dementia without behavioral disturbance, psychotic disturbance, mood disturbance, or anxiety, unspecified dementia type (H) 03/13/2023     Priority: Medium     Chronic, continuous use of opioids 05/17/2021     Priority: Medium     Patient is followed by Shabnam Burgos MD for ongoing prescription of pain medication.  All refills should only be approved by this provider, or covering partner.    Medication(s): Oxycodone 10mg.   Maximum quantity per month: #60  Clinic visit frequency required: Q 3 months   PDMP Review       Value Time User    State PDMP site checked  Yes 12/7/2022  3:06 PM Shabnam Burgos MD        Controlled substance agreement:  CSA -- Patient Level:     [Media Unavailable] Controlled Substance  Agreement - Opioid - Scan on 1/24/2022  9:32 AM: OPIOD/OPIOD PLUS CONTROLLED SUBSTANCE AGREEMENT       Pain Clinic evaluation in the past:     Opioid Risk Tool Total Score(s):  No flowsheet data found.           Arthritis      Priority: Medium     Acute respiratory distress 11/16/2018     Priority: Medium     Fever 11/16/2018     Priority: Medium     Hypoxia 11/16/2018     Priority: Medium     Lipodermatosclerosis 05/11/2017     Priority: Medium     Anemia in chronic kidney disease (CKD) 02/17/2016     Priority: Medium     CKD (chronic kidney disease) stage 3, GFR 30-59 ml/min (H) 02/17/2016     Priority: Medium     Type 2 diabetes mellitus without complication (H) 02/17/2016     Priority: Medium     Hypothyroidism 05/07/2015     Priority: Medium     GERD (gastroesophageal reflux disease) 08/05/2014     Priority: Medium     Venous (peripheral) insufficiency 04/24/2014     Priority: Medium     Spinal stenosis of lumbar region with neurogenic claudication      Priority: Medium     Advanced care planning/counseling discussion 03/07/2013     Priority: Medium     Advance Care Planning 8/15/2016: ACP Review of Chart / Resources Provided:  Reviewed chart for advance care plan.  Graham Mills has no plan or code status on file however states presence of ACP document. Copy requested. Confirmed code status reflects current choices pending receipt of document/advance care plan review.  Confirmed/documented legally designated decision makers.  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       Transient cerebral ischemia 10/10/2002     Priority: Medium     Obsessive-compulsive disorder 09/07/2001     Priority: Medium     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     Priority: Medium     Benign essential hypertension 01/31/2001     Priority: Medium        Past Medical History:    Past Medical History:   Diagnosis Date     Anemia in chronic kidney disease (CKD) 2/17/2016     Arthritis      CKD (chronic kidney disease) stage 3, GFR 30-59 ml/min (H) 2/17/2016     GERD (gastroesophageal reflux disease) 8/5/2014     Hypothyroidism 5/7/2015     Lumbago 11/11/2010     Obsessive-compulsive disorders 09/07/2001     Osteoarthrosis, unspecified whether generalized or 12/02/2005     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     Restless Legs Syndrome 11/11/2010     Spinal stenosis      Type 2 diabetes mellitus without complication (H) 2/17/2016     Unspecified essential hypertension 01/31/2001     Unspecified transient cerebral ischemia 10/10/2002     Venous (peripheral) insufficiency 4/24/2014       Past Surgical History:    Past Surgical History:   Procedure Laterality Date     BACK SURGERY  2003     CHOLECYSTECTOMY  1970     COLONOSCOPY  2000     hemicolectomy      Polyps, colon     hiatal hernia  1970     TONSILLECTOMY         Family History:    Family History   Problem Relation Age of Onset     Other - See Comments Mother         Cataracts       Social History:  Marital Status:   [5]  Social History     Tobacco Use     Smoking status: Former     Packs/day: 1.00     Years: 10.00     Pack years: 10.00     Types: Cigarettes, Cigars     Passive exposure: Past     Smokeless tobacco: Never     Tobacco comments:     tried to quit-yes, year quit-1975, no passive exposure   Vaping Use     Vaping Use: Never used   Substance Use Topics     Alcohol use: No     Drug use: No        Medications:    amLODIPine (NORVASC) 5  MG tablet  cholecalciferol 125 MCG (5000 UT) CAPS  Docusate Sodium (COLACE PO)  ferrous sulfate (IRON) 325 (65 FE) MG tablet  furosemide (LASIX) 20 MG tablet  gabapentin (NEURONTIN) 300 MG capsule  levothyroxine (SYNTHROID/LEVOTHROID) 75 MCG tablet  Multiple Vitamins-Minerals (PRESERVISION AREDS 2 PO)  omeprazole (PRILOSEC) 40 MG DR capsule  vitamin B-12 (CYANOCOBALAMIN) 1000 MCG CR tablet          Review of Systems   All other systems reviewed and are negative.      Physical Exam   BP: 123/66  Pulse: 82  Temp: 99.7  F (37.6  C)  Resp: 18  SpO2: 94 %      Physical Exam  Vitals and nursing note reviewed.   Constitutional:       General: He is not in acute distress.     Appearance: Normal appearance. He is not ill-appearing or toxic-appearing.   HENT:      Head: Normocephalic and atraumatic.      Right Ear: External ear normal.      Left Ear: External ear normal.      Nose: Nose normal. No congestion.      Mouth/Throat:      Mouth: Mucous membranes are moist.      Pharynx: Oropharynx is clear.   Eyes:      Extraocular Movements: Extraocular movements intact.      Pupils: Pupils are equal, round, and reactive to light.   Cardiovascular:      Rate and Rhythm: Normal rate and regular rhythm.      Pulses: Normal pulses.      Heart sounds: Normal heart sounds.   Pulmonary:      Comments: Diffuse coarse breath sounds, nonlabored respirations  Abdominal:      General: Abdomen is flat.      Palpations: Abdomen is soft.      Tenderness: There is no abdominal tenderness.   Musculoskeletal:         General: No tenderness. Normal range of motion.      Right lower leg: No edema.      Left lower leg: No edema.   Skin:     General: Skin is warm and dry.      Capillary Refill: Capillary refill takes less than 2 seconds.   Neurological:      General: No focal deficit present.      Mental Status: He is alert and oriented to person, place, and time.   Psychiatric:         Mood and Affect: Mood normal.         Behavior: Behavior normal.          ED Course           Procedures              Results for orders placed or performed during the hospital encounter of 07/20/23 (from the past 24 hour(s))   CBC with platelets   Result Value Ref Range    WBC Count 8.4 4.0 - 11.0 10e3/uL    RBC Count 3.89 (L) 4.40 - 5.90 10e6/uL    Hemoglobin 12.5 (L) 13.3 - 17.7 g/dL    Hematocrit 37.2 (L) 40.0 - 53.0 %    MCV 96 78 - 100 fL    MCH 32.1 26.5 - 33.0 pg    MCHC 33.6 31.5 - 36.5 g/dL    RDW 13.3 10.0 - 15.0 %    Platelet Count 145 (L) 150 - 450 10e3/uL   Basic metabolic panel   Result Value Ref Range    Sodium 139 136 - 145 mmol/L    Potassium 3.5 3.4 - 5.3 mmol/L    Chloride 99 98 - 107 mmol/L    Carbon Dioxide (CO2) 19 (L) 22 - 29 mmol/L    Anion Gap 21 (H) 7 - 15 mmol/L    Urea Nitrogen 18.8 8.0 - 23.0 mg/dL    Creatinine 1.34 (H) 0.67 - 1.17 mg/dL    Calcium 9.4 8.2 - 9.6 mg/dL    Glucose 160 (H) 70 - 99 mg/dL    GFR Estimate 50 (L) >60 mL/min/1.73m2   Lactic acid whole blood   Result Value Ref Range    Lactic Acid 1.8 0.7 - 2.0 mmol/L   NT pro BNP   Result Value Ref Range    N terminal Pro BNP Inpatient 6,136 (H) 0 - 1,800 pg/mL   Extra Tube    Narrative    The following orders were created for panel order Extra Tube.  Procedure                               Abnormality         Status                     ---------                               -----------         ------                     Extra Blue Top Tube[[id number]]                              Final result               Extra Red Top Tube[[id number]]                               Final result                 Please view results for these tests on the individual orders.   Extra Blue Top Tube   Result Value Ref Range    Hold Specimen JIC    Extra Red Top Tube   Result Value Ref Range    Hold Specimen JIC    Extra Tube    Narrative    The following orders were created for panel order Extra Tube.  Procedure                               Abnormality         Status                     ---------                                -----------         ------                     Extra Blood Culture Bottle[[id number]]                       Final result                 Please view results for these tests on the individual orders.   Extra Blood Culture Bottle   Result Value Ref Range    Hold Specimen JIC    Troponin T, High Sensitivity   Result Value Ref Range    Troponin T, High Sensitivity 66 (H) <=22 ng/L   Symptomatic Influenza A/B, RSV, & SARS-CoV2 PCR (COVID-19) Nose    Specimen: Nose; Swab   Result Value Ref Range    Influenza A PCR Negative Negative    Influenza B PCR Negative Negative    RSV PCR Negative Negative    SARS CoV2 PCR Positive (A) Negative    Narrative    Testing was performed using the Xpert Xpress CoV2/Flu/RSV Assay on the Cepheid GeneXpert Instrument. This test should be ordered for the detection of SARS-CoV-2, influenza, and RSV viruses in individuals who meet clinical and/or epidemiological criteria. Test performance is unknown in asymptomatic patients. This test is for in vitro diagnostic use under the FDA EUA for laboratories certified under CLIA to perform high or moderate complexity testing. This test has not been FDA cleared or approved. A negative result does not rule out the presence of PCR inhibitors in the specimen or target RNA in concentration below the limit of detection for the assay. If only one viral target is positive but coinfection with multiple targets is suspected, the sample should be re-tested with another FDA cleared, approved, or authorized test, if coinfection would change clinical management. This test was validated by the [facility]. These laboratories are certified under the Clinical Laboratory Improvement Amendments of 1988 (CLIA-88) as qualified to perform high complexity laboratory testing.   Chest XR,  PA & LAT    Narrative    PROCEDURE:  XR CHEST 2 VIEWS    HISTORY:  Cough.     COMPARISON:  None.    FINDINGS:   The cardiac silhouette is normal in size. The  pulmonary vasculature is  normal.  There is linear atelectasis or scarring at the left lung base  No pleural effusion or pneumothorax.      Impression    IMPRESSION:  Linear atelectasis or scarring at the left lung base      CHARLES MENA MD         SYSTEM ID:  [id number]       Medications - No data to display    Assessments & Plan (with Medical Decision Making)     I have reviewed the nursing notes.    89-year-old male presenting with cough, weakness, decreased p.o. intake, no shortness of breath.  He did take ibuprofen not long before coming here and has a borderline elevated temperature so may have had a fever prior to taking antipyretics.  He has no abdominal pain and is systemically well-appearing, but given his age I am concerned for an infection.  We will plan on broad infectious work-up, and disposition is pending findings of this work-up.  Given that he has no fever, is not tachycardic, will hold on giving IV fluids, or starting broad-spectrum antibiotics until work-up is further obtained.  If he has an entirely normal work-up, and remained stable, he may be appropriate for discharge home with close primary care follow-up.    See ED Course.    I have reviewed the findings, diagnosis, plan and need for follow up with the patient.      New Prescriptions    No medications on file       Final diagnoses:   Hypervolemia, unspecified hypervolemia type       7/20/2023   [facility]     Quique Seymour MD  07/20/23 5016

## 2023-07-20 NOTE — ED NOTES
"Patient presents to emergency room with daughter with complaints of generalized weakness and cough. Cough x 3-4 weeks. Cough is worsening. Increased weakness over the past week. Decreased appetite. C/o chills this am. Daughter reports pt was wearing four shirts when she arrived this am. Homecare nurse gave pt two ibuprofen around 1pm today. Awake and alert. Denies SOB. No increased work of breathing. Denies pain. \"I just feel weak.\" Able to transfer to the bed from w/c with stand by assist. No edema noted. Pt wearing compression stockings.   "

## 2023-07-20 NOTE — ED TRIAGE NOTES
"\"Having a cough for a couple of months but it is getting worse.  Also fatigued, having generalized weakness and not eating much because I do not have much of an appetite.\"        "

## 2023-07-20 NOTE — H&P
[facility]    History and Physical - Hospitalist Service       Date of Admission:  7/20/2023    Assessment & Plan      Graham Mills is a 89 year old male admitted on 7/20/2023 with COVID-19 infection    #COVID-19 infection  - 89 year old male who presented to the hospital complaining of generalized weakness, cough, low oral intake, malaise.  -On admission he has low-grade fever with temperature of 99.7,   -.  He is on room air.    - positive for COVID-19,  - chest x-ray reported Linear atelectasis or scarring at the left lung base    -Patient does not have pneumonia and he is not requiring oxygen.  -  Supportive care for the patient.  -Acetaminophen for fever,  -Robitussin for cough  -Airborne precaution      #Elevated BNP  #Elevated troponin  #Questionable CHF  -On admission BNP elevated in the 6000 range and high-sensitivity troponin of 66  -Echocardiogram ordered  -Patient denies chest pain  -Elevated troponin most likely not related to acute coronary syndrome  -EKG with no ST elevation  -Trend troponin  -No baseline echocardiogram available  -Resume home Lasix      #Chronic kidney disease  -On admission creatinine 1.32 which is his baseline      #Hypertension  -resume home amlodipine    #Hypothyroidism  -resume home levothyxine            Diet:  cardiac  DVT Prophylaxis: Enoxaparin (Lovenox) SQ  Conroy Catheter: Not present  Lines: None     Cardiac Monitoring: None  Code Status:   full    Clinically Significant Risk Factors Present on Admission             # Anion Gap Metabolic Acidosis: Highest Anion Gap = 21 mmol/L in last 2 days, will monitor and treat as appropriate      # Hypertension: Noted on problem list   # Dementia: noted on problem list   # DMII: A1C = 7.7 % (Ref range: <5.7 %) within past 6 months             Disposition Plan      Expected Discharge Date: 07/21/2023                  Wagner Velásquez MD  Hospitalist Service  [facility]  Securely message with Vocera (more  info)  Text page via [facility] Paging/Directory     ______________________________________________________________________    Chief Complaint   Generalized weakness, cough    History is obtained from the patient    History of Present Illness   Graham Mills is a 89 year old male with established diagnosis of chronic kidney disease stage III, hypertension, hypothyroidism who presented to the hospital complaining of generalized weakness, cough, low oral intake, malaise.  He has been having also chills.  Symptoms have been going on for the last 3 weeks.  He denies nausea, vomiting, headache.    On admission he has low-grade fever with temperature of 99.7, other vital signs unremarkable.  He is on room air.  Creatinine 1.34 which is about his baseline, N-terminal proBNP elevated at 6136, troponin high-sensitivity elevated at 66, positive for COVID-19, chest x-ray reported Linear atelectasis or scarring at the left lung base      Patient symptoms assessed as being due to COVID-19 infection.  Patient does not have pneumonia and he is not requiring oxygen.  Supportive care for the patient.      Past Medical History    Past Medical History:   Diagnosis Date     Anemia in chronic kidney disease (CKD) 2/17/2016     Arthritis      CKD (chronic kidney disease) stage 3, GFR 30-59 ml/min (H) 2/17/2016     GERD (gastroesophageal reflux disease) 8/5/2014     Hypothyroidism 5/7/2015     Lumbago 11/11/2010     Obsessive-compulsive disorders 09/07/2001     Osteoarthrosis, unspecified whether generalized or 12/02/2005     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     Restless Legs Syndrome 11/11/2010     Spinal stenosis      Type 2 diabetes mellitus without complication (H) 2/17/2016     Unspecified essential hypertension 01/31/2001     Unspecified transient cerebral ischemia 10/10/2002     Venous (peripheral) insufficiency 4/24/2014       Past Surgical History   Past Surgical History:   Procedure Laterality Date      BACK SURGERY       CHOLECYSTECTOMY       COLONOSCOPY  2000     hemicolectomy      Polyps, colon     hiatal hernia  1970     TONSILLECTOMY         Prior to Admission Medications   Prior to Admission Medications   Prescriptions Last Dose Informant Patient Reported? Taking?   Docusate Sodium (COLACE PO)  Self Yes Yes   Sig: Take 100 mg by mouth 2 tabs daily   Multiple Vitamins-Minerals (PRESERVISION AREDS 2 PO)   Yes Yes   Sig: Take 1 tablet by mouth 2 times daily   amLODIPine (NORVASC) 5 MG tablet   No Yes   Sig: TAKE 1 TABLET DAILY   cholecalciferol 125 MCG (5000 UT) CAPS  Self Yes Yes   Si capsule daily Take 1 by oral route every day.   ferrous sulfate (IRON) 325 (65 FE) MG tablet  Self No Yes   Sig: Take 1 tablet (325 mg) by mouth daily (with breakfast)   Patient taking differently: Take 325 mg by mouth 2 times daily   furosemide (LASIX) 20 MG tablet   No Yes   Sig: TAKE 1 TABLET (20MG) BY MOUTH ONCE DAILY.   gabapentin (NEURONTIN) 300 MG capsule   No Yes   Sig: Take 2 capsules (600 mg) by mouth 3 times daily   levothyroxine (SYNTHROID/LEVOTHROID) 75 MCG tablet   No Yes   Sig: TAKE 1 TABLET (75MCG) BY MOUTH ONCE DAILY.   omeprazole (PRILOSEC) 40 MG DR capsule   No Yes   Sig: TAKE 1 CAPSULE (40MG) BY MOUTH ONCE DAILY.   vitamin B-12 (CYANOCOBALAMIN) 1000 MCG CR tablet   No Yes   Sig: Take 1 tablet (1,000 mcg) by mouth daily      Facility-Administered Medications: None        Review of Systems    The 10 point Review of Systems is negative other than noted in the HPI   Physical Exam   Vital Signs: Temp: 98  F (36.7  C) Temp src: Tympanic BP: 138/74 Pulse: 77   Resp: 18 SpO2: 97 % O2 Device: None (Room air)    Weight: 0 lbs 0 oz    Constitutional:       General: He is not in acute distress.     Appearance: Normal appearance. He is not ill-appearing or toxic-appearing.   HENT:      Head: Normocephalic and atraumatic.      Right Ear: External ear normal.      Left Ear: External ear normal.      Nose: Nose  normal. No congestion.      Mouth/Throat:      Mouth: Mucous membranes are moist.      Pharynx: Oropharynx is clear.   Eyes:      Extraocular Movements: Extraocular movements intact.      Pupils: Pupils are equal, round, and reactive to light.   Cardiovascular:      Rate and Rhythm: Normal rate and regular rhythm.      Pulses: Normal pulses.      Heart sounds: Normal heart sounds.   Pulmonary:      Comments: Diffuse coarse breath sounds, nonlabored respirations  Abdominal:      General: Abdomen is flat.      Palpations: Abdomen is soft.      Tenderness: There is no abdominal tenderness.   Musculoskeletal:         General: No tenderness. Normal range of motion.      Right lower leg: No edema.      Left lower leg: No edema.   Skin:     General: Skin is warm and dry.      Capillary Refill: Capillary refill takes less than 2 seconds.   Neurological:      General: No focal deficit present.      Mental Status: He is alert and oriented to person, place, and time.   Psychiatric:         Mood and Affect: Mood normal.         Behavior: Behavior normal.     Medical Decision Making       78 MINUTES SPENT BY ME on the date of service doing chart review, history, exam, documentation & further activities per the note.      Data   ------------------------- PAST 24 HR DATA REVIEWED -----------------------------------------------    I have personally reviewed the following data over the past 24 hrs:    8.4  \   12.5 (L)   / 145 (L)     139 99 18.8 /  160 (H)   3.5 19 (L) 1.34 (H) \       Trop: 66 (H) BNP: 6,136 (H)       TSH: 0.85 T4: N/A A1C: N/A       Procal: N/A CRP: N/A Lactic Acid: 1.8         Imaging results reviewed over the past 24 hrs:   Recent Results (from the past 24 hour(s))   Chest XR,  PA & LAT    Narrative    PROCEDURE:  XR CHEST 2 VIEWS    HISTORY:  Cough.     COMPARISON:  None.    FINDINGS:   The cardiac silhouette is normal in size. The pulmonary vasculature is  normal.  There is linear atelectasis or scarring at  the left lung base  No pleural effusion or pneumothorax.      Impression    IMPRESSION:  Linear atelectasis or scarring at the left lung base      CHARLES MENA MD         SYSTEM ID:  [id number]

## 2023-07-21 NOTE — PROGRESS NOTES
07/21/23 1300   Appointment Info   Signing Clinician's Name / Credentials (PT) Dexter Jones DPT   Appointment Canceled Reason (PT) Patient declined   Appointment Cancel Comments (PT) Pt. declined PT evalaution on entrance to room x2

## 2023-07-21 NOTE — DISCHARGE INSTRUCTIONS
The [facility] Line can be reached at [phone]. They are a free options counseling service of the [place] [employer]-a state agency. They can help with:  *any Medicare questions  *Prescription Drug help   *Future planning including long-term care financing options  *Paperwork help and applying for federal or state programs  *Caregiver planning and support including home and community based services  *And other services for older adults

## 2023-07-21 NOTE — PLAN OF CARE
Face to face report given with opportunity to observe patient.    Report given to Hansel Lane RN   7/20/2023  7:42 PM

## 2023-07-21 NOTE — PROGRESS NOTES
07/21/23 1100   Appointment Info   Signing Clinician's Name / Credentials (OT) Liliana Swann, OTR/L   Quick Adds   Quick Adds Certification   Living Environment   People in Home alone   Current Living Arrangements house   Home Accessibility stairs to enter home   Number of Stairs, Main Entrance 4   Transportation Anticipated family or friend will provide   Living Environment Comments Pt states he lives on the main floor once up the 2-3 [address]. Pt's daughter lives right next door. Pt sleeps in a recliner at home. The bathroom is on the main floor and has taller toilets, a walk in shower, and grab bars.   Self-Care   Usual Activity Tolerance moderate   Current Activity Tolerance moderate   Equipment Currently Used at Home walker, rolling;grab bar, toilet;grab bar, tub/shower   Fall history within last six months no   Activity/Exercise/Self-Care Comment Pt reports he is independent with ADLs. He uses a 4WW for functional mobility.   Instrumental Activities of Daily Living (IADL)   Previous Responsibilities meal prep   IADL Comments Pt reports he mostly uses the toaster for meal prep otherwise family brings food. He has assistance with cleaning, laundry, shopping, driving, yard work, med management, and finances.   General Information   Onset of Illness/Injury or Date of Surgery 07/20/23   Referring Physician Dr. Velásquez   Patient/Family Therapy Goal Statement (OT) Return home   Additional Occupational Profile Info/Pertinent History of Current Problem Pt admitted due to COVID. Prior to admission, pt lived alone and was independent in ADLs. His daughter lives next door and pt has good support for IADLs.   Cognitive Status Examination   Orientation Status person;place   Affect/Mental Status (Cognitive) WFL   Follows Commands WFL   Cognitive Status Comments disoriented to time   Visual Perception   Visual Impairment/Limitations WFL   Sensory   Sensory Quick Adds sensation intact   Pain Assessment   Patient Currently in Pain No    Range of Motion Comprehensive   General Range of Motion no range of motion deficits identified   Strength Comprehensive (MMT)   Comment, General Manual Muscle Testing (MMT) Assessment BUEs grossly 4/5   Muscle Tone Assessment   Muscle Tone Quick Adds No deficits were identified   Coordination   Upper Extremity Coordination No deficits were identified   Bed Mobility   Comment (Bed Mobility) NT, pt in the chair but also reports he doesn't sleep in a bed   Transfers   Transfers sit-stand transfer   Sit-Stand Transfer   Sit-Stand [place] (Transfers) supervision   Assistive Device (Sit-Stand Transfers) walker, front-wheeled   Balance   Balance Comments Pt ambulated to the door using FWW with SBA.   Activities of Daily Living   BADL Assessment/Intervention lower body dressing;grooming   Lower Body Dressing Assessment/Training   Position (Lower Body Dressing) unsupported sitting   Comment, (Lower Body Dressing) Pt was having troubles donning the back of his L slipper so OT assisted   [place] Level (Lower Body Dressing) minimum assist (75% patient effort)   Grooming Assessment/Training   Position (Grooming) unsupported standing   [place] Level (Grooming) supervision   Comment, (Grooming) Pt washed his hands standing at the sink   Clinical Impression   Criteria for Skilled Therapeutic Interventions Met (OT) Yes, treatment indicated   OT Diagnosis mild weakness and deconditioning   Influenced by the following impairments strength, activity tolerance   Assessment of Occupational Performance 1-3 Performance Deficits   Identified Performance Deficits ADLs   Planned Therapy Interventions (OT) ADL retraining;strengthening;home program guidelines;progressive activity/exercise;risk factor education   Clinical Decision Making Complexity (OT) low complexity   Risk & Benefits of therapy have been explained evaluation/treatment results reviewed;care plan/treatment goals reviewed;current/potential barriers  reviewed;risks/benefits reviewed;participants voiced agreement with care plan;participants included;patient   Clinical Impression Comments OT evaluation completed. Prior to admission, pt lived alone and was independent in ADLs. Pt's daughter lives next door and pt has a good support system for IADLs. Pt completed ADLs/functional mobility with mostly SBA (CGA 1x) today and appears close to his baseline. Pending results of PT evaluation, pt appears appropriate to return home with continued assistance that he previously received. Pt is eager to return home and feels he would do better in his own environment. Plan to treat pt an additional 1-2 treatment sessions to ensure he continues to do well with ADLs if pt is still here.   OT Total Evaluation Time   OT Eval, Low Complexity Minutes (68749) 15   Therapy Certification   Medical Diagnosis COVID-19   Start of Care Date 07/21/23   Certification date from 07/21/23   Certification date to 07/28/23   OT Goals   Therapy Frequency (OT) 5 times/wk   OT Predicted Duration/Target Date for Goal Attainment 07/28/23   OT Goals Hygiene/Grooming;Lower Body Dressing;Toilet Transfer/Toileting   OT: Hygiene/Grooming modified independent   OT: Lower Body Dressing Modified independent   OT: Toilet Transfer/Toileting Modified independent   Interventions   Interventions Quick Adds Therapeutic Activity   Therapeutic Activities   Therapeutic Activity Minutes (39677) 8   Symptoms noted during/after treatment fatigue   Treatment Detail/Skilled Intervention Pt ambulated back from the door using FWW with SBA. He ambulated into the BR using FWW with SBA-CGA due to the small space in the bathroom and pt trying to go in backways/sideways. Pt educated on safety with that. Pt completed a toilet transfer (with the commode overlay as his toilets at home are higher) with SBA. Pt returned to the chair. Discharge was discussed and pt feels ready to return home. He was educated in use of his call light and  how he can't get up without assistance. Call light placed in his lap and clip alert attached.   OT Discharge Planning   OT Plan Continue to see pt 1-2x to ensure he continues to do well with ADLs/functional mobility   OT Discharge Recommendation (DC Rec) home with assist;home with home care occupational therapy   OT Rationale for DC Rec OT evaluation completed. Prior to admission, pt lived alone and was independent in ADLs. Pt's daughter lives next door and pt has a good support system for IADLs. Pt completed ADLs/functional mobility with mostly SBA (CGA 1x) today and appears close to his baseline. Pending results of PT evaluation, pt appears appropriate to return home with continued assistance that he previously received. Pt is eager to return home and feels he would do better in his own environment. Plan to treat pt an additional 1-2 treatment sessions to ensure he continues to do well with ADLs if pt is still here.   OT Brief overview of current status SBA sit<>stand from chair, SBA ambulating to/from the BR using FWW, SBA toilet transfer with commode overlay, Renetta LB dressing, SBA grooming tasks standing at the sink, CGA-SBA when entering BR due to lack of space   Total Session Time   Timed Code Treatment Minutes 8   Total Session Time (sum of timed and untimed services) 23   I certify the need for these services furnished under this plan of treatment and while under my care. (Physician co-signature of this document indicates review and certification of the therapy plan).

## 2023-07-21 NOTE — PROGRESS NOTES
"[facility]    Hospitalist Progress Note    Date of Service (when I saw the patient): 07/21/2023    Assessment & Plan   Graham Mills is a 89 year old male who was admitted on 7/20/2023.    COVID-19 infection: Tmax 100.2.  Patient has generalized malaise, decreased p.o. intake, and a slight cough.  Chest x-ray showed linear atelectasis or scarring left lung base, but not south infiltrate.  Patient is not requiring supplemental oxygen.  -With generalized weakness, patient not safe to go back home alone  -Supportive care  -PT/OT for home safety assessment    CVS: On admission BNP elevated to 6000, slight troponin elevation is downtrending.  Really no signs or symptoms of heart failure.  No chest pain, EKG without ischemic changes.  Patient has history of essential hypertension on amlodipine.  -Echocardiogram pending  -Resume home CV medications including Lasix    Chronic kidney disease stage III:   -Renal function stable with GFR 59, creatinine 1.18  -Monitoring renal function and urine output    Hypothyroidism: Continue home Synthroid    FEN: Oral diet as tolerated.  Electrolytes within normal limits    Clinically Significant Risk Factors Present on Admission             # Anion Gap Metabolic Acidosis: Highest Anion Gap = 21 mmol/L in last 2 days, will monitor and treat as appropriate      # Hypertension: Noted on problem list   # Dementia: noted on problem list   # DMII: A1C = 7.7 % (Ref range: <5.7 %) within past 6 months    # Obesity: Estimated body mass index is 31.25 kg/m  as calculated from the following:    Height as of this encounter: 1.575 m (5' 2\").    Weight as of this encounter: 77.5 kg (170 lb 13.7 oz).            DVT Prophylaxis: Ambulate every shift    Code Status: Full Code    Disposition: Expected discharge in 1-2 days once clinically improved.    Roel Gonzales MD, MD      Interval History   Patient seen in room.  Sitting up in chair, easily awakened.  Interactive, no distress.  " Complains of being tired, but no other focal symptoms.  No shortness of breath, no chest pain.    -Data reviewed today: I reviewed all new labs and imaging results over the last 24 hours. I personally reviewed imaging reports.    Physical Exam   Temp: 100.2  F (37.9  C) Temp src: Tympanic BP: 145/62 Pulse: 83   Resp: 18 SpO2: 94 % O2 Device: None (Room air)    Vitals:    07/20/23 1841 07/21/23 0220   Weight: 78.1 kg (172 lb 2.9 oz) 77.5 kg (170 lb 13.7 oz)     Vital Signs with Ranges  Temp:  [97.7  F (36.5  C)-100.2  F (37.9  C)] 100.2  F (37.9  C)  Pulse:  [76-83] 83  Resp:  [16-18] 18  BP: (123-147)/(62-74) 145/62  SpO2:  [94 %-100 %] 94 %    Intake/Output Summary (Last 24 hours) at 7/21/2023 0822  Last data filed at 7/20/2023 2253  Gross per 24 hour   Intake --   Output 450 ml   Net -450 ml       Peripheral IV 07/20/23 Anterior;Distal;Left Lower forearm (Active)   Site Assessment WDL 07/21/23 0544   Line Status Saline locked 07/21/23 0544   Line Intervention Flushed 07/21/23 0544   Phlebitis Scale 0-->no symptoms 07/21/23 0544   Number of days: 1     No line/device    Constitutional - patient found sleeping in chair, awakens easily, no distress  HEENT - atraumatic, normocephalic  Neck - supple, no masses, no JVD  CVS - S1 S2 RRR, no murmurs, rubs, gallops  Respiratory - CTA b/l  GI - soft, NT, ND, + bowel sounds, no organomegaly  Musculoskeletal - no LE edema, no lesions  Neuro - oriented x 3, no gross focal deficits      Medications       amLODIPine  5 mg Oral Daily     cholecalciferol  125 mcg Oral Daily     cyanocobalamin  1,000 mcg Sublingual Daily     ferrous sulfate  325 mg Oral BID     furosemide  20 mg Oral Daily     gabapentin  600 mg Oral TID     levothyroxine  75 mcg Oral QAM AC     pantoprazole  40 mg Oral Daily       Data   Recent Labs   Lab 07/21/23  0530 07/20/23  1629   WBC 7.5 8.4   HGB 12.1* 12.5*   MCV 95 96   * 145*    139   POTASSIUM 3.8 3.5   CHLORIDE 100 99   CO2 21* 19*   BUN  17.5 18.8   CR 1.18* 1.34*   ANIONGAP 15 21*   GILL 9.1 9.4   * 160*     Lactic Acid   Date Value Ref Range Status   07/20/2023 1.8 0.7 - 2.0 mmol/L Final       Recent Results (from the past 24 hour(s))   Chest XR,  PA & LAT    Narrative    PROCEDURE:  XR CHEST 2 VIEWS    HISTORY:  Cough.     COMPARISON:  None.    FINDINGS:   The cardiac silhouette is normal in size. The pulmonary vasculature is  normal.  There is linear atelectasis or scarring at the left lung base  No pleural effusion or pneumothorax.      Impression    IMPRESSION:  Linear atelectasis or scarring at the left lung base      CHARLES MENA MD         SYSTEM ID:  [id number]       Roel Gonzales MD

## 2023-07-21 NOTE — PLAN OF CARE
[facility] Inpatient Admission Note:    Patient admitted to 3216/3216-1 at approximately 1845 via wheel chair accompanied by transport tech from emergency room . Report received from Mandi ROBLES in SBAR format at 1830 via telephone. Patient ambulated to bed via self.. Patient is alert and oriented X 3, denies pain; rates at 0 on 0-10 scale.  Patient oriented to room, unit, hourly rounding, and plan of care. Explained admission packet and patient handbook with patient bill of rights brochure. Will continue to monitor and document as needed.     Inpatient Nursing criteria listed below was met:    Health care directives status obtained and documented: Yes    Patient identifies a surrogate decision maker: Yes If yes, who:Daughter Gretchen Contact Information:see facesheet     If initial lactic acid greater than 2.0, repeat lactic acid drawn within one hour of arrival to unit: NA. If no, state reason: normal 1.8     Clergy visit ordered if patient requests: No    Skin issues/needs documented: No    Isolation Patient: yes Education given, correct sign in place and documentation row added to PCS:  Yes    Fall Prevention Yes: Care plan updated, education given and documented, sticker and magnet in place: No    Care Plan initiated: Yes    Education Documented (including assessment): Yes    Patient has discharge needs : Yes If yes, please explain:SNIF vs return home with home care and family

## 2023-07-21 NOTE — PLAN OF CARE
"Pt is alert with some intermittent confusion. VS and assessments as charted. 100.2 for temp. Denies any pain. Up in chair sleeping through shift at pts request. Infrequent dry cough noted. UA obtained and sent to LAB. AT PM shift change, staff received a call from . Pt had called 911 to inform them that he had to urinate. Staff donned PPE and went into pts room. Pt had call light visible in lap. Pt stated that he had been trying to contact someone for \"ever\" and staff noted trash on the floor by pts room door. Staff assisted pt with voiding and demonstrated use of call light multiple times until pt was able to turn call light on himself. Pt was later heard calling out for help urinating. Staff noted that call light was visible and in reach. Pt educated on call light use for second time and encouraged to hit button for help. Pt remains on special precautions this shift. Up in chair with alarms present, call light remains within reach. Frequent rounding with room near nurses station for safety.     Face to face report given with opportunity to observe patient.    Report given to TRAVIS Ruiz RN   7/21/2023  7:03 AM      "

## 2023-07-21 NOTE — PROGRESS NOTES
07/21/23 1300   General Information   Assessment completed with: Children   Person spoke with Gretchen   Type of CM/SW Visit Initial Assessment   Primary Care Provider verified and updated as needed? Yes   Readmission Within the Last 30 Days no previous admission in last 30 days   Reason for Consult discharge planning   Communication Assessment   Patient / Family communication style spoken language (English or Bilingual)   Living Environment   People in Home alone   Unique Family Situation   (Lives with pet, cat)   Current Living Arrangements house   Care Provided by child(atif)  (Gretchen Zapata, lives next door and comes over to helpo patient multiple times a day)   Provides Care For no one, unable/limited ability to care for self   Able to Return to Prior Arrangements yes   Assessment of Family/Social Support   Marital Status    Who is your support system? 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   Current Resources   Patient receiving home care services: No   Community Resources None   Supplies Currently Used at Home None   Functional Status   Usual Activity Tolerance moderate   Current Activity Tolerance moderate   Assessment of Functional Status   Dependent ADLs: Ambulation-walker;Bathing   Dependent IADLs: Cleaning;Cooking;Laundry;Shopping;Meal Preparation;Transportation   Assesssment of Functional Status Needs assistance with laundry/houskeeping;Needs assistance with meals;Needs assistance with bathing;Needs assistance with transportation   Discharge Needs Assessment   Equipment Currently Used at Home walker, rolling;grab bar, toilet;grab bar, tub/shower   Transportation Anticipated  family or friend will provide

## 2023-07-21 NOTE — MEDICATION SCRIBE - ADMISSION MEDICATION HISTORY
Medication Scribe Admission Medication History    Admission medication history is complete. The information provided in this note is only as accurate as the sources available at the time of the update.    Medication reconciliation/reorder completed by provider prior to medication history? Yes    Information Source(s): Family member and CareEverywhere/SureScripts via phone    Pertinent Information:   Patient's daughter manages his medications and is a good historian.  Reports pt had all of his medications yesterday PTA up until 1 pm.     Changes made to PTA medication list:    Added: None    Deleted: None    Changed: None    Medication Affordability:  Not including over the counter (OTC) medications, was there a time in the past 3 months when you did not take your medications as prescribed because of cost?: Unable to Assess (review completed with daughter)    Allergies reviewed with patient and updates made in EHR: yes    Medication History Completed By: Pauline Anand 7/21/2023 8:34 AM    Prior to Admission medications    Medication Sig Last Dose Taking? Auth Provider Long Term End Date   amLODIPine (NORVASC) 5 MG tablet TAKE 1 TABLET DAILY 7/20/2023 at 0700 Yes Shabnam Burgos MD Yes    cholecalciferol 125 MCG (5000 UT) CAPS Take 1 capsule by mouth daily 7/20/2023 at 0700 Yes Reported, Patient Yes    docusate sodium (COLACE) 100 MG capsule Take 200 mg by mouth At Bedtime 7/19/2023 at 1900 Yes Reported, Patient     ferrous sulfate (IRON) 325 (65 FE) MG tablet Take 1 tablet (325 mg) by mouth daily (with breakfast)  Patient taking differently: Take 325 mg by mouth 2 times daily 7/20/2023 at 1300 Yes Kristi Walker NP     furosemide (LASIX) 20 MG tablet TAKE 1 TABLET (20MG) BY MOUTH ONCE DAILY. 7/20/2023 at 0700 Yes Shabnam Burgos MD Yes    gabapentin (NEURONTIN) 300 MG capsule Take 2 capsules (600 mg) by mouth 3 times daily 7/20/2023 at 1300 Yes Shabnam Burgos MD Yes    levothyroxine  (SYNTHROID/LEVOTHROID) 75 MCG tablet TAKE 1 TABLET (75MCG) BY MOUTH ONCE DAILY. 7/20/2023 at 0700 Yes hSabnam Burgos MD Yes    Multiple Vitamins-Minerals (PRESERVISION AREDS 2 PO) Take 1 tablet by mouth 2 times daily -afternoon and evening. 7/20/2023 at 1300 Yes Reported, Patient     omeprazole (PRILOSEC) 40 MG DR capsule TAKE 1 CAPSULE (40MG) BY MOUTH ONCE DAILY. 7/20/2023 at 0700 Yes Shabnam Burgos MD     vitamin B-12 (CYANOCOBALAMIN) 1000 MCG CR tablet Take 1 tablet (1,000 mcg) by mouth daily 7/20/2023 at 0700 Yes Shabnam Burgos MD

## 2023-07-22 NOTE — PLAN OF CARE
"Patient is alert and confused to situation, date and time, makes needs known. Up to bathroom with assist of one. Patient denies pain. Assessment as charted. /64 (BP Location: Right arm, Cuff Size: Adult Regular)   Pulse 79   Temp 97  F (36.1  C) (Tympanic)   Resp 18   Ht 1.575 m (5' 2\")   Wt 77.5 kg (170 lb 13.7 oz)   SpO2 96%   BMI 31.25 kg/m      Face to face report given with opportunity to observe patient.    Report given to TRAVIS Becker RN   7/21/2023  7:13 PM        "

## 2023-07-22 NOTE — PLAN OF CARE
Goal Outcome Evaluation:       Pt alert, disoriented to situation, date. VSS, afebrile. LS with fine crackles to bases. Remains on RA, O2 sats mid 90's. HRR. Murmur noted. On tele: SR 70s per ICU report. Assist of 1 to bathroom. Slept in chair. IV SL.     Face to face report given with opportunity to observe patient.    Report given to TRAVIS Ayers RN   7/22/2023  6:53 AM

## 2023-07-22 NOTE — PLAN OF CARE
"/77 (BP Location: Right arm, Cuff Size: Adult Regular)   Pulse 78   Temp 98.9  F (37.2  C) (Tympanic)   Resp 18   Ht 1.575 m (5' 2\")   Wt 78 kg (171 lb 15.3 oz)   SpO2 95%   BMI 31.45 kg/m       VSS. Denies pain. Alert, disoriented to time & situation. LS clear w diminished bases. Infrequent congested cough, remains on RA. Daughter transported pt at discharge        Patient discharged at 3:00 PM via wheel chair accompanied by daughter and staff. Prescriptions - None ordered for discharge. All belongings sent with patient.     Discharge instructions reviewed with daughter. Listed belongings gathered and returned to patient. Yes    Patient discharged to home.   Report called to NA    Surgical Patient   Surgical Procedures during stay: No  Did patient receive discharge instruction on wound care and recognition of infection symptoms? Yes    MISC  Follow up appointment made:  Yes  Home medications returned to patient: N/A  Patient reports pain was well managed at discharge: Yes                   "

## 2023-07-22 NOTE — DISCHARGE SUMMARY
[facility]    Discharge Summary  Hospitalist    Date of Admission:  7/20/2023  Date of Discharge:  7/22/2023  Discharging Provider: Roel Gonzales MD, MD  Date of Service (when I saw the patient): 07/22/23    Discharge Diagnoses   Principal Problem:    Hypervolemia, unspecified hypervolemia type  COVID-19 infection  Chronic kidney disease stage III  Hypothyroidism      History of Present Illness   Graham Mills is an 89 year old male who presented with general malaise.  Please see admission H+P for additional details.    Hospital Course   Graham Mills was admitted on 7/20/2023.  89-year-old male with history of chronic kidney disease stage III, hypothyroidism, who lives alone presents with general malaise for the last several days.  Patient had a work-up which included a chest x-ray showing some perhaps linear atelectasis or scarring in the left lung base, otherwise negative.  He did test positive for COVID-19, which is likely the cause of his symptoms.  He does have slight troponin leak, and BNP is elevated at 6000, however he truly does not have any clinical evidence of heart failure.  Echocardiogram was obtained showing ejection fraction of 60 to 65%, no regional wall motion abnormalities, grade 1 diastolic dysfunction.  Troponins did trend down.  He never did require any supplemental oxygen, no hypoxia was observed.  Physical and Occupational Therapy did assess the patient, deemed him safe to return home with home care services.  He does feel better this morning, wants to return home.  He does feel strong enough.  Family will transport at home, he will follow-up with his primary care physician within the week to assess for continued improvement.    Roel Gonzales MD      Significant Results and Procedures   See below    Pending Results   These results will be followed up by Shabnam Burgos    Unresulted Labs Ordered in the Past 30 Days of this Admission     Date and Time Order Name Status  Description    7/20/2023  6:51 PM Mycoplasma pneumoniae christopher Igm In process     7/20/2023  4:05 PM Blood Culture Peripheral Blood Preliminary           Code Status   Full Code       Primary Care Physician   Shabnam Burgos    Physical Exam   Temp: 98.9  F (37.2  C) Temp src: Tympanic BP: 166/77 Pulse: 78   Resp: 18 SpO2: 95 % O2 Device: None (Room air)    Vitals:    07/20/23 1841 07/21/23 0220 07/22/23 0600   Weight: 78.1 kg (172 lb 2.9 oz) 77.5 kg (170 lb 13.7 oz) 78 kg (171 lb 15.3 oz)     Vital Signs with Ranges  Temp:  [97  F (36.1  C)-99.6  F (37.6  C)] 98.9  F (37.2  C)  Pulse:  [71-86] 78  Resp:  [16-20] 18  BP: (115-166)/(53-77) 166/77  SpO2:  [93 %-96 %] 95 %  I/O last 3 completed shifts:  In: 1191 [P.O.:1191]  Out: 800 [Urine:800]    Constitutional: awake, elderly male in no distress, appears younger than chronological age  Eyes: PERRLA, no injection, no icterus  HEENT: atraumatic, normocephalic  Respiratory: CTA b/l  Cardiovascular: S1 S2 RRR  GI: soft, NT, ND, + bowel sounds  Lymph/Hematologic: no palpable lymphadenopathy  Skin: no rashes, no lesions  Musculoskeletal: No edema, good tone, no deformities  Neurologic: oriented x 3, no focal deficits  Psychiatric: appropriate affect      Discharge Disposition   Discharged to home with homecare services  Condition at discharge: Stable    Consultations This Hospital Stay   PHYSICAL THERAPY ADULT IP CONSULT  OCCUPATIONAL THERAPY ADULT IP CONSULT    Time Spent on this Encounter   IRoel MD, personally saw the patient today and spent greater than 30 minutes discharging this patient.    Discharge Orders      Home Care Referral      Reason for your hospital stay    COVID19 infection     Follow-up and recommended labs and tests     Follow up with primary care provider, Shabnam Burgos, within 7 days for hospital follow- up.  No follow up labs or test are needed.     Activity    Your activity upon discharge: activity as tolerated     Diet    Follow this  diet upon discharge: Orders Placed This Encounter      Combination Diet Low Saturated Fat Na <2400mg Diet, No Caffeine Diet     Discharge Medications   Current Discharge Medication List      CONTINUE these medications which have NOT CHANGED    Details   amLODIPine (NORVASC) 5 MG tablet TAKE 1 TABLET DAILY  Qty: 90 tablet, Refills: 0    Associated Diagnoses: Benign essential hypertension      cholecalciferol 125 MCG (5000 UT) CAPS Take 1 capsule by mouth daily      docusate sodium (COLACE) 100 MG capsule Take 200 mg by mouth At Bedtime      ferrous sulfate (IRON) 325 (65 FE) MG tablet Take 1 tablet (325 mg) by mouth daily (with breakfast)  Qty: 30 tablet, Refills: 2    Associated Diagnoses: Anemia      furosemide (LASIX) 20 MG tablet TAKE 1 TABLET (20MG) BY MOUTH ONCE DAILY.  Qty: 90 tablet, Refills: 0    Associated Diagnoses: Peripheral edema      gabapentin (NEURONTIN) 300 MG capsule Take 2 capsules (600 mg) by mouth 3 times daily  Qty: 540 capsule, Refills: 0    Associated Diagnoses: Spinal stenosis, unspecified spinal region      levothyroxine (SYNTHROID/LEVOTHROID) 75 MCG tablet TAKE 1 TABLET (75MCG) BY MOUTH ONCE DAILY.  Qty: 90 tablet, Refills: 2    Associated Diagnoses: Hypothyroidism, unspecified type      Multiple Vitamins-Minerals (PRESERVISION AREDS 2 PO) Take 1 tablet by mouth 2 times daily -afternoon and evening.      omeprazole (PRILOSEC) 40 MG DR capsule TAKE 1 CAPSULE (40MG) BY MOUTH ONCE DAILY.  Qty: 90 capsule, Refills: 2    Associated Diagnoses: Dyspepsia      vitamin B-12 (CYANOCOBALAMIN) 1000 MCG CR tablet Take 1 tablet (1,000 mcg) by mouth daily  Qty: 90 tablet, Refills: 3    Associated Diagnoses: Vitamin B12 deficiency (non anemic)           Allergies   Allergies   Allergen Reactions     Amoxicillin-Pot Clavulanate Nausea     Influenza Virus Vaccine      Reaction happened many years ago. Per daughter- thought he was having a heart attack.      Levofloxacin Unknown     Levaquin       Data    Most Recent 3 CBC's:  Recent Labs   Lab Test 07/21/23  0530 07/20/23  1629 03/08/23  1531   WBC 7.5 8.4 11.6*   HGB 12.1* 12.5* 13.6   MCV 95 96 93   * 145* 206      Most Recent 3 BMP's:  Recent Labs   Lab Test 07/21/23  0530 07/20/23  1629 06/16/23  1507    139 140   POTASSIUM 3.8 3.5 3.4   CHLORIDE 100 99 99   CO2 21* 19* 30*   BUN 17.5 18.8 20.1   CR 1.18* 1.34* 1.38*   ANIONGAP 15 21* 11   GILL 9.1 9.4 9.6   * 160* 172*     Most Recent 2 LFT's:  Recent Labs   Lab Test 03/08/23  1531 12/07/22  0342   AST 25 22   ALT 14 13   ALKPHOS 117 108   BILITOTAL 0.4 0.3     Most Recent INR's and Anticoagulation Dosing History:  Anticoagulation Dose History         No data to display              Most Recent 3 Troponin's:No lab results found.  Most Recent Cholesterol Panel:  Recent Labs   Lab Test 06/16/23  1507   CHOL 200*   *   HDL 37*   TRIG 183*     Most Recent 6 Bacteria Isolates From Any Culture (See EPIC Reports for Culture Details):No lab results found.  Most Recent TSH, T4 and A1c Labs:  Recent Labs   Lab Test 07/20/23  1629 06/16/23  1507 08/06/15  1128 05/06/15  1022   TSH 0.85 0.78   < > 10.33*   T4  --   --   --  0.73*   A1C  --  7.7*   < >  --     < > = values in this interval not displayed.     Results for orders placed or performed during the hospital encounter of 07/20/23   Chest XR,  PA & LAT    Narrative    PROCEDURE:  XR CHEST 2 VIEWS    HISTORY:  Cough.     COMPARISON:  None.    FINDINGS:   The cardiac silhouette is normal in size. The pulmonary vasculature is  normal.  There is linear atelectasis or scarring at the left lung base  No pleural effusion or pneumothorax.      Impression    IMPRESSION:  Linear atelectasis or scarring at the left lung base      CHARLES MENA MD         SYSTEM ID:  [id number]   Echocardiogram Complete     Value    LVEF  60-65%    Narrative    [id number]  [id number]  [id number]  [id number]^[facility]  [facility]  Echocardiography Laboratory  [address] [place] [id number]     Name: ANITA COOK  MRN: [id number]  : 1933  Study Date: 2023 10:47 AM  Age: 89 yrs  Gender: Male  Patient Location: [facility]  Reason For Study: CHF  History: CHF  Ordering Physician: LISA WATTERS  Performed By: Yokasta Pace LIZABETH     BSA: 1.8 m2  Height: 62 in  Weight: 170 lb  ______________________________________________________________________________  Procedure  Complete Portable Echo Adult. Contrast Definity. Technically difficult  study.Extremely difficult acoustic windows despite the use of contrast for  endcardial border definition.  ______________________________________________________________________________  Interpretation Summary  Global and regional left ventricular function is normal with an EF of 60-65%.  Moderate concentric wall thickening consistent with left ventricular  hypertrophy is present.  Grade I or early diastolic dysfunction.  Global right ventricular function is normal.  Trace to mild aortic insufficiency is present.  Moderate to severe aortic stenosis is present.  The mean gradient across the aortic valve is 35.7 mmHg.  Trace tricuspid insufficiency is present.  ______________________________________________________________________________  Left Ventricle  Global and regional left ventricular function is normal with an EF of 60-65%.  Moderate concentric wall thickening consistent with left ventricular  hypertrophy is present. Grade I or early diastolic dysfunction.     Right Ventricle  Global right ventricular function is normal.     Atria  Mild left atrial enlargement is present.     Mitral Valve  Trace mitral insufficiency is present.     Aortic Valve  Severe aortic valve calcification is present. Trace to mild aortic  insufficiency is present. Moderate to severe aortic stenosis is present. The  peak aortic velocity is 3.86 m/sec. The mean gradient across the aortic valve  is 35.7  mmHg. The peak AoV pressure gradient is 59.6 mmHg.     Tricuspid Valve  Trace tricuspid insufficiency is present.     Vessels  Ascending aorta 3.56 cm.     Pericardium  No pericardial effusion is present.     ______________________________________________________________________________  MMode/2D Measurements & Calculations  IVSd: 1.5 cm  LVIDd: 4.0 cm  LVIDs: 3.0 cm  LVPWd: 1.4 cm  FS: 24.8 %  LV mass(C)d: 214.3 grams  LV mass(C)dI: 120.1 grams/m2  Ao root diam: 3.9 cm  asc Aorta Diam: 3.6 cm  LVOT diam: 2.1 cm  LVOT area: 3.3 cm2     LA Volume Indexed (AL/bp): 32.7 ml/m2  RWT: 0.67  TAPSE: 2.6 cm     Doppler Measurements & Calculations  MV E max tommie: 55.0 cm/sec  MV A max tommie: 100.0 cm/sec  MV E/A: 0.55  MV dec slope: 255.0 cm/sec2  MV dec time: 0.22 sec  Ao V2 max: 386.0 cm/sec  Ao max P.6 mmHg  Ao V2 mean: 286.0 cm/sec  Ao mean P.7 mmHg  Ao V2 VTI: 81.2 cm  IVAN(I,D): 0.74 cm2  IVAN(V,D): 0.68 cm2  LV V1 max P.5 mmHg  LV V1 max: 79.0 cm/sec  LV V1 VTI: 18.1 cm  SV(LVOT): 60.2 ml  SI(LVOT): 33.8 ml/m2  AV Tommie Ratio (DI): 0.20     IVAN Index (cm2/m2): 0.42  E/E' av.0  Lateral E/e': 11.6  Medial E/e': 16.5     ______________________________________________________________________________  Report approved by: Lucas Gross 2023 07:00

## 2023-07-22 NOTE — PROGRESS NOTES
"   07/22/23 1400   Appointment Info   Signing Clinician's Name / Credentials (PT) Melquiades Cobb DPT   Rehab Comments (PT) Pt was finishing up using the bathroom upon PT approach (RN noted pt got up to bathroom on own) and was agreeable to treatment.   Living Environment   People in Home alone   Current Living Arrangements house   Home Accessibility stairs to enter home   Number of Stairs, Main Entrance 4   Stair Railings, Main Entrance railings on both sides of stairs   Transportation Anticipated family or friend will provide   Living Environment Comments Pt lives alone in a 2 story home with basement, but all of pt's needs are met on the main level.  Bathroom has a walk-in shower with grab bars and pt notes having grab bars \"all over\" his bathroom.  Pt's daughter lives next door and does work, but checks in daily.   Self-Care   Usual Activity Tolerance moderate   Current Activity Tolerance moderate   Equipment Currently Used at Home walker, rolling;grab bar, toilet;grab bar, tub/shower   Activity/Exercise/Self-Care Comment Pt reports he is [place] with ADLs except general SPV from daughter for showers.  He uses a 4WW for functional mobility and does not leave home without his daughter whom provides transportation.   General Information   Onset of Illness/Injury or Date of Surgery 07/20/23   Referring Physician Dr. Velásquez   Patient/Family Therapy Goals Statement (PT) Pt hopeful to return home today.   Pertinent History of Current Problem (include personal factors and/or comorbidities that impact the POC) Pt was admitted for COVID-19 infection.   General Observations Pt on room air throughout this visit.   Cognition   Affect/Mental Status (Cognition) WFL   Orientation Status (Cognition) oriented to;person;place;situation  (Pt not oriented to time, but was easily corrected.)   Follows Commands (Cognition) WFL   Safety Deficit (Cognition) minimal deficit;impulsivity   Memory Deficit (Cognition) minimal deficit   Pain " "Assessment   Patient Currently in Pain No   Posture    Posture Forward head position  (sig forward flexed head throughout - difficulty correcting.)   Range of Motion (ROM)   Range of Motion ROM is WFL   Strength (Manual Muscle Testing)   Strength (Manual Muscle Testing)   (Minimal functional strength deficits observed during mobility.)   Bed Mobility   Comment, (Bed Mobility) Bed mobility not attempted as pt already up in recliner and notes has slept in his recliner for many years now - never sleeps in a bed.   Transfers   Transfers bed-chair transfer;sit-stand transfer;toilet transfer   Bed-Chair Transfer   Assistive Device (Bed-Chair Transfers) walker, front-wheeled   Bed-Chair [place] (Transfers) supervision   Sit-Stand Transfer   Sit-Stand [place] (Transfers) supervision   Assistive Device (Sit-Stand Transfers) walker, front-wheeled   Toilet Transfer   [place] Level (Toilet Transfer) supervision   Assistive Device (Toilet Transfer) grab bars/safety frame   Type (Toilet Transfer) stand-sit;sit-stand   Comment, (Toilet Transfer) Pt able to complete all toileting tasks and wash hands = SPV   Gait/Stairs (Locomotion)   [place] Level (Gait) supervision   Assistive Device (Gait) walker, front-wheeled   Distance in Feet 200   Pattern (Gait) step-through   Deviations/Abnormal Patterns (Gait) gait speed decreased;other (see comments)  (forward flexed head)   Negotiation (Stairs) stairs independence;handrail location;number of steps;ascending technique;descending technique   [place] Level (Stairs) supervision  (SBA)   Handrail Location (Stairs) both sides   Number of Steps (Stairs) 4  (four-6\" therapy stairs)   Ascending Technique (Stairs) step-over-step   Descending Technique (Stairs) step-over-step   Comment, (Gait/Stairs) Min cues to not let heel hang over edge of step while ascending.  (After completing gait/stairs w/o a rest break, SpO2 = 96%, HR = 83 on room air.)   Balance   Balance " Comments Sitting balance = Good w/o support; standing balance = Good- with FWW support.   Coordination   Coordination no deficits were identified   Muscle Tone   Muscle Tone no deficits were identified   Clinical Impression   Criteria for Skilled Therapeutic Intervention Evaluation only   PT Diagnosis (PT) N/A eval only   Influenced by the following impairments N/A eval only   Functional limitations due to impairments N/A eval only   Clinical Presentation (PT Evaluation Complexity) Stable/Uncomplicated   Clinical Presentation Rationale Clinical judgment   Clinical Decision Making (Complexity) low complexity   Planned Therapy Interventions (PT)   (None)   Anticipated Equipment Needs at Discharge (PT)   (Pt has 4WW in place at home)   Risk & Benefits of therapy have been explained evaluation/treatment results reviewed;care plan/treatment goals reviewed;risks/benefits reviewed;current/potential barriers reviewed;participants voiced agreement with care plan;participants included;patient   Clinical Impression Comments Although pt exhibits min functional deficits he does appear to be at his reported functional baseline and was safe with basic mobility today - no need for skilled PT treatment at this time.   PT Total Evaluation Time   PT Eval, Low Complexity Minutes (80221) 22   PT Discharge Planning   PT Plan Discharge PT   PT Discharge Recommendation (DC Rec) home with assist   PT Rationale for DC Rec Pt appears safe with basic household type mobility and his daughter is always present for showers and whenever he leaves the home (only time he does stairs) and appears at his functional baseline so feel no need for homecare PT.   PT Brief overview of current status Transfers = SPV with FWW; gait x 200 ft = SPV with FWW; stairs x4 = SBA with bilat railing support.   Total Session Time   Total Session Time (sum of timed and untimed services) 22

## 2023-07-24 NOTE — PROGRESS NOTES
Received referral for skilled nursing, PT and OT.  Home care is currently unable to provide service in the [facility] [place] due to staffing issues.  I did call and speak with client's daughter Gretchen and informed her.  She was given # for county to check on elderly services that may be available and plans to check with [facility] to see if able to provide the above services.  Please refer them to another agency.  Thank you

## 2023-07-25 NOTE — TELEPHONE ENCOUNTER
To: Dr. Burgos nurse    caretaker requesting return phone call to schedule a hospital follow up AND to reschedule the 9/29/23 3 month follow up as provider will be OUT ... Phone is [phone]     Thank you

## 2023-07-25 NOTE — PROGRESS NOTES
Clinic Care Coordination Contact  [facility]: Post-Discharge Note  SITUATION                                                      Admission:    Admission Date: 07/20/23      Discharge:   Discharge Date: 07/22/23    BACKGROUND                                                      Per hospital discharge summary and inpatient provider notes:  Principal Problem:    Hypervolemia, unspecified hypervolemia type  COVID-19 infection  Chronic kidney disease stage III  Hypothyroidism              History of Present Illness  Graham Mills is an 89 year old male who presented with general malaise.  Please see admission H+P for additional details.           Hospital Course  Graham Mills was admitted on 7/20/2023.  89-year-old male with history of chronic kidney disease stage III, hypothyroidism, who lives alone presents with general malaise for the last several days.  Patient had a work-up which included a chest x-ray showing some perhaps linear atelectasis or scarring in the left lung base, otherwise negative.  He did test positive for COVID-19, which is likely the cause of his symptoms.  He does have slight troponin leak, and BNP is elevated at 6000, however he truly does not have any clinical evidence of heart failure.  Echocardiogram was obtained showing ejection fraction of 60 to 65%, no regional wall motion abnormalities, grade 1 diastolic dysfunction.  Troponins did trend down.  He never did require any supplemental oxygen, no hypoxia was observed.  Physical and Occupational Therapy did assess the patient, deemed him safe to return home with home care services.  He does feel better this morning, wants to return home.  He does feel strong enough.  Family will transport at home, he will follow-up with his primary care physician within the week to assess for continued improvement    ASSESSMENT           Discharge Assessment  How are you doing now that you are home?: Daughter report patient does not have any pain,  shortness of breath or fevers. He is having normal urine output and bowel movements. She does have to promt him to eat and to take his medications. Patient has a good appetite. Daughter has to assist patient with bathing and she does all cleaning and cooking. Patient having troubled with short term memory and is unaable to state the month or year but has not problems with remembering the past.  How are your symptoms? (Red Flag symptoms escalate to triage hotline per guidelines): Improved  Do you feel your condition is stable enough to be safe at home until your provider visit?: Yes  Does the patient have their discharge instructions? : Yes  Does the patient have questions regarding their discharge instructions? : No  Were you started on any new medications or were there changes to any of your previous medications? : No  Does the patient have all of their medications?: Yes  Do you have questions regarding any of your medications? : No  Do you have all of your needed medical supplies or equipment (DME)?  (i.e. oxygen tank, CPAP, cane, etc.): Yes  Discharge follow-up appointment scheduled within 14 calendar days? : Yes  Discharge Follow Up Appointment Date: 07/28/23  Discharge Follow Up Appointment Scheduled with?: Primary Care Provider                  PLAN                                                      Outpatient Plan:  Daughter report patient does not have any pain, shortness of breath or fevers. He is having normal urine output and bowel movements. She does have to promt him to eat and to take his medications. Patient has a good appetite. Daughter has to assist patient with bathing and she does all cleaning and cooking. Patient having troubled with short term memory and is unaable to state the month or year but has not problems with remembering the past. Follow up with PCP scheduled for 7/28/2023 at 4 PM. Order pended to PCP for Home Care Services.    Future Appointments   Date Time Provider Department Center    7/28/2023  4:00 PM Shabnam Burgos MD [facility] Nancy Posey         For any urgent concerns, please contact our 24 hour nurse triage line: [phone] ([phone])         Kimberly J Boecker, RN

## 2023-07-25 NOTE — PROGRESS NOTES
Clinic Care Coordination Contact  Care Team Conversations    RN CC called and spoke with patients daughter Gretchen. Daughter report patient does not have any pain, shortness of breath or fevers. He is having normal urine output and bowel movements. She does have to promt him to eat and to take his medications. Patient has a good appetite. Daughter has to assist patient with bathing and she does all cleaning and cooking. Patient having troubled with short term memory and is unaable to state the month or year but has not problems with remembering the past. RN will stop and visit patient and his daughter during PCP visit July 28th at 4pm.    Kimberly Boecker, RN  Care Coordination

## 2023-07-26 NOTE — PROGRESS NOTES
Chart accessed for documentation related review.      Late entry from 7/25/23 afternoon    Received call from Kim Boecker, care coordinator in the clinic in regards to a patient that had discharged over the weekend. On the call with her was Cristal Campos inquired in an accusatory manner as to why home care services were not arranged for patient as discharge orders present for home care. Writer had not worked with the patient in question and needed further information in regards to patient in order to review what had been arranged by the  working with the patient in the hospital. Recommendations from therapies was that patient was at baseline and no further therapies were not needed at discharge. Per chart, note from Ana at [facility] that referral had been received and they didn't have the ability to provide services for patient d/t staffing and location of patient. Ana then contacted patient's daughter to inform her of this information and daughter explained that she was actually looking for PCA type services for respite care. Alie continued to focus on the doctors orders for home care and not what the patient actually needed upon discharge not allowing writer to explain the discharge planning process for patients while in the hospital. Per conversation with Alie, they had not contacted the patient to inquire about requested services/ what patient was wanting in his home. Alie ended the phone conversation abruptly indicating that they would contact their supervisor in regards to situation with patient.

## 2023-07-26 NOTE — TELEPHONE ENCOUNTER
Patient's September appt has been rescheduled. And he is scheduled to see dr wang 7/8 for hospital follow up

## 2023-07-27 NOTE — PROGRESS NOTES
Clinic Care Coordination Contact  Care Team Conversations    RN CC pended order for [facility] to PCP.      Kimberly Boecker, TRAVIS  Care Coordination

## 2023-07-28 NOTE — PROGRESS NOTES
Clinic Care Coordination Contact  Care Team Conversations    RN CC met with patient and his daughter during visit with Aubrey. Daughter looking for help caring for her dad who lives alone and is needing prompting to remind him to eat and take his medication. States patient has not had a shower in 1 month. She has been giving him sponge baths. Patient refused his afternoon medication today per daughter but patient didn't remember doing that. Increased forgetfulness and increased fatigue. Daughter states patient is sleeping 22 hours per day. Patient nodding off during visit. Patient not able to state year or month but did know who the president was. Appetite has dramatically declined. Referral sent for home care and AEOA. RN CC will follow up next week.     Kimberly Boecker, RN  Care Coordination

## 2023-07-28 NOTE — PROGRESS NOTES
"  Assessment & Plan     Infection due to 2019 novel coronavirus / Moderate dementia without behavioral disturbance, psychotic disturbance, mood disturbance, or anxiety, unspecified dementia type (H)  Pt hospitalized with worsening weakness, mental status. Found to have COVID 19. Per daughter, sleeping still most of the day. Cognition has declined significantly. Needs prompting for just about everything right now. Daughter is coming over multiple times a day to assist pt. Needs additional help in the home. Pt has been resistant, but today very south conversation was had with patient. He will need further support in the home otherwise, needs to consider facility for cares. He is adamantly opposed to this. Reluctantly agrees to some therapy and HHA in the home today. Further referrals made for AEOA, etc.     Hypothyroidism, unspecified type  - TSH with free T4 reflex  - Basic metabolic panel    Stage 3 chronic kidney disease, unspecified whether stage 3a or 3b CKD (H)  - Basic metabolic panel    Chronic cough / Weight loss  Cough persisting since last fall, not worse or better. Weight trending down, suspect related to dementia, but will get CT asn CXR unremarkable.   - CT Chest w/o Contrast  - Prealbumin    Spinal stenosis of lumbar region with neurogenic claudication  Continues to deny pain, consider further reduciton in pain medication. Given recent discharge from hospital, no changes made today    30 minutes spent by me on the date of the encounter doing chart review, review of test results, interpretation of tests, patient visit, and documentation      MED REC REQUIRED  Post Medication Reconciliation Status:     BMI:   Estimated body mass index is 31.45 kg/m  as calculated from the following:    Height as of 7/20/23: 1.575 m (5' 2\").    Weight as of 7/22/23: 78 kg (171 lb 15.3 oz).     No follow-ups on file.    Shabnam Burgos MD  [facility] [place]    Subjective   Graham Mills is a 89 year old, " presenting for the following health issues:  Hospital F/U        7/28/2023     3:47 PM   Additional Questions   Roomed by Hansel Fabián   Accompanied by Daughter (Sona Campos (RN care coordinator)       [facility]           7/25/2023     4:21 PM   Post Discharge Outreach   Discharge Diagnosis Principal Problem:    Hypervolemia, unspecified hypervolemia type  COVID-19 infection  Chronic kidney disease stage III  Hypothyroidism     Hospital Follow-up Visit:    Hospital/Nursing Home/IP Rehab Facility: [facility]  Date of Admission: 7/20/2023  Date of Discharge: 7/22/2023  Reason(s) for Admission: Hypervolemia, unspecified hypervolemia type  COVID-19 infection  Chronic kidney disease stage III  Hypothyroidism    Was your hospitalization related to COVID-19? YES   How are you feeling today? Better  In the past 24 hours have you had shortness of breath when speaking, walking, or climbing stairs? My breathing issues have stayed the same  Do you have a cough? Yes, I have a cough but it's not worse  When is the last time you had a fever greater than 100? Unknown  Are you having any other symptoms? Diarrhea, Loss of appetite, Fatigue, and Confusion   Do you have any other stressors you would like to discuss with your provider? No    Was the patient in the ICU or did the patient experience delirium during hospitalization?  Yes     Problems taking medications regularly:  Yes, has been taking them on and off   Medication changes since discharge: None  Problems adhering to non-medication therapy:  None    Summary of hospitalization:  [facility] discharge summary reviewed  Diagnostic Tests/Treatments reviewed.  Follow up needed: PCP2  Other Healthcare Providers Involved in Patient s Care:         Care Coordination  Update since discharge: No change   Plan of care communicated with patient, family, and caregiver         Review of Systems   Constitutional, HEENT, cardiovascular, pulmonary, gi and gu systems  are negative, except as otherwise noted.      Objective    /78 (BP Location: Left arm, Patient Position: Sitting, Cuff Size: Adult Regular)   Pulse 72   Temp 97.5  F (36.4  C) (Tympanic)   SpO2 96%   There is no height or weight on file to calculate BMI.  Physical Exam   GENERAL: no distress  RESP: lungs clear to auscultation - no rales, rhonchi or wheezes  CV: regular rates and rhythm, normal S1 S2, no S3 or S4, no murmur, click or rub, and no peripheral edema  ABDOMEN: soft, nontender, no hepatosplenomegaly, no masses and bowel sounds normal  MS: no gross musculoskeletal defects noted, no edema  SKIN: no suspicious lesions or rashes  NEURO: pt is alert, but inattentive. Generalized weakness, noted. No focal findings, speech wnl  PSYCH: affect flat, judgement and insight impaired, and appearance well groomed    No results found for any visits on 07/28/23.

## 2023-08-02 NOTE — PROGRESS NOTES
Clinic Care Coordination Contact  Care Team Conversations    RN CC called and spoke with Gretchen, patients daughter to see is she had heard from [facility]. Gretchen stated that she had not heard from anyone. RN called [facility] and they stated that they did not have staff available to help this patient. RN sent note to [facility] to resend referral to [facility]. Gave Gretchen [place] [facility] phone number to call to set up in home visit. She said that she would call them today. RN CC will call to heck up next week.    Kimberly Boecker, TRAVIS  Care Coordinator

## 2023-08-03 NOTE — TELEPHONE ENCOUNTER
Daughter is calling in regards to her father. Patient has declined since Friday, and she does not believe in home care would be an option. Daughter is wanting a Hospice Evaluation done ASAP.. Daughter told   is out office until next week. PLease call daughter back at [phone].

## 2023-08-07 NOTE — PROGRESS NOTES
Clinic Care Coordination Contact  Care Team Conversations    TRAVIS Padilla from home care requested on Teams that I call her as she had just had visit with patient and family today. She stated 2 out of 3 of the patients daughters are not wanting Hospice services which Gretchen (daughter) who lives next door to patient had requested  on 8/3/2023. She said that the family is going to get [facility]. RN called and spoke with patients daughter Gretchen who reports some family dynamics going on and that youngest daughter wast to move the patient closer to her but Gretchen has the POA and would like the patient to remain in his own home. She made a [facility] appointment for Wednesday August 9th and [facility] nurse is meeting with daughter tomorrow. Gave Gretchen RN CC contact number. RN CC will follow up next week.    Kimberly Boecker, RN  Care Coordination

## 2023-08-07 NOTE — PROGRESS NOTES
Met with Graham and 3 daughters today at his home for a consult for hospice eligibility. After family discussion it was decided that they want to pursue some PCA services in the home, along with home care. They are aware that [facility] is unable to accept home health referral due to inability to staff at this current time. They are going to call local [facility] clinic to get client reestablished in hopes to have some VA benefits involved. They are also working with a  who is a family friend to file MA paperwork. They are going to also reach out to local people in the community that might be willing to assist in taking care of Graham in his home. At visit family reports he is doing quite well since the 2 daughters from out of state arrived, and they believe with a little help in the home setting to assure he is eating, taking his medication, and providing companionship this will help keep Graham in his own home. Beth ROBLESCC updated as well, and will be following up with family for any questions they might have.

## 2023-08-08 NOTE — TELEPHONE ENCOUNTER
Writer spoke with daughter, family has decided to keep pt home and to re-establish with [facility] to try and get HC from them. Family will keep you as primary.  Dulce Rasmussen LPN

## 2023-08-15 NOTE — PROGRESS NOTES
Clinic Care Coordination Contact  Care Team Conversations    RN CC called and spoke with patients daughter Gretchen. Gretchen reports she hired private caregiver for 4 hours per day to help her provide care for patient in his home. Gretchen reports it has been a big help but she is hoping the VA will step in and help more. Patient had an established care appointment with the [facility] last week but Gretchen would still want patient to continue to follow [facility]. She will here back from the [facility] sometimes in September. RN CC will continue to follow up.     Kimberly Boecker, RN  Care Coordination

## 2023-08-24 NOTE — TELEPHONE ENCOUNTER
gabapentin (NEURONTIN) 300 MG capsule 540 capsule 0 5/24/2023     amLODIPine (NORVASC) 5 MG tablet 90 tablet 0 3/17/2023     furosemide (LASIX) 20 MG tablet 90 tablet 0 3/17/2023       Last Office Visit: 07/28/23  Future Office visit:    Next 5 appointments (look out 90 days)      Oct 03, 2023  1:30 PM  (Arrive by 1:15 PM)  Office Visit with Shabnam Burgos MD  [facility] [place] ([facility] [place] ) [address]  [place] [address]  [phone]             Routing refill request to provider for review/approval because:

## 2023-08-30 NOTE — TELEPHONE ENCOUNTER
We can set up a virtual appt in that Dr only slot. They do nto need to bring him in unless they wish to.

## 2023-08-30 NOTE — TELEPHONE ENCOUNTER
Please see MCM and advise.      If needing an appointment PCP has DR only at 4:30 PM on Friday, September 1st.    Or a covering provider?

## 2023-08-30 NOTE — PROGRESS NOTES
Clinic Care Coordination Contact  Care Team Conversations    Patient admitted to Hospice/Palliative Care. No further contact from this RN.     Kimberly Boecker, RN

## 2023-08-31 NOTE — TELEPHONE ENCOUNTER
Discussed with [facility], they will admit today or tomorrow. Please update family.     They also need fax with items below uziel bender:    - face sheet, demographics, last notes, order to admit

## 2023-08-31 NOTE — TELEPHONE ENCOUNTER
Called to pt and pt gave verbal okay to talk to his daughters.  Spoke to pt daughter Remi.  Who stated their father was having chest pain since this morning and that hir right arm down to his fingers is numb and tingly.  Per daughters pt was seen in the ER after a heart attack a few days ago and was sent home on comfort cares but the comfort cares had not started.  Pt daughters were encouraged to call 911 for an ambulance.  Per pt daughters pt chest pain has subsided but his right arm numbness and tingling remained.  Spoke to PCP and she said that if pt and daughter want to send pt in to the ER to get evaluated they could or they could keep him home and PCP would call in some nitroglycerin and pain medication and get pt started on Hospice with a same day admission.  Per pt daughters pt did not want to go into the ER and would like the medication sent in with a referral to Hospice.     Pended nitroglycerin  Please review and sign if appropriate    Pended Hospice  Please check diagnosis  Please review and sign if appropriate.      Pt will need and order for pain medications.    We will update family when order are completed.

## 2023-08-31 NOTE — TELEPHONE ENCOUNTER
Rx sent.     Monitor for dizziness, HA after nitroglycerin.     Working on referral. [facility] does not go to [place].

## 2023-09-15 NOTE — TELEPHONE ENCOUNTER
11:41 AM    Reason for Call: Phone Call    Description: TRAVIS Jackson calling from [facility] stating Graham Mills passed away this morning at 10:41am in his home